# Patient Record
Sex: FEMALE | Race: WHITE | NOT HISPANIC OR LATINO | URBAN - METROPOLITAN AREA
[De-identification: names, ages, dates, MRNs, and addresses within clinical notes are randomized per-mention and may not be internally consistent; named-entity substitution may affect disease eponyms.]

---

## 2017-01-10 ENCOUNTER — EMERGENCY (EMERGENCY)
Facility: HOSPITAL | Age: 37
LOS: 1 days | Discharge: HOME | End: 2017-01-10
Attending: EMERGENCY MEDICINE | Admitting: EMERGENCY MEDICINE
Payer: COMMERCIAL

## 2017-01-10 VITALS
RESPIRATION RATE: 16 BRPM | SYSTOLIC BLOOD PRESSURE: 149 MMHG | OXYGEN SATURATION: 100 % | HEART RATE: 88 BPM | DIASTOLIC BLOOD PRESSURE: 85 MMHG | TEMPERATURE: 98 F

## 2017-01-10 DIAGNOSIS — Z98.84 BARIATRIC SURGERY STATUS: Chronic | ICD-10-CM

## 2017-01-10 PROCEDURE — 99283 EMERGENCY DEPT VISIT LOW MDM: CPT

## 2017-01-10 RX ORDER — OXYCODONE HYDROCHLORIDE 5 MG/1
1 TABLET ORAL
Qty: 8 | Refills: 0 | OUTPATIENT
Start: 2017-01-10 | End: 2017-01-12

## 2017-01-10 NOTE — ED PROVIDER NOTE - OBJECTIVE STATEMENT
35 y/o F, w/ PMHx of Bariatric Surgery, Polyps, and Asthma, presents to ED c/o worsening tooth pain due to fractured tooth. Taking Tylenol and [id number] Motrin to no relief. Recently underwent Bariatric surgery so she could not take care of her dental issue. Scheduled for dental appt this coming 1/13/17. Was told she has an impacted wisdom tooth that needs to be pulled. Denies other complaints.

## 2017-01-10 NOTE — ED PROVIDER NOTE - PLAN OF CARE
Rest, drink plenty of fluids.  Advance activity as tolerated.  Continue all previously prescribed medications as directed.  Take Motrin 600 mg every 8 hours as needed for moderate pain -- take with food. Take Percocet 325/5 once every 6 hours as needed for severe pain -- causes drowsiness; DO NOT drink alcohol, drive, or operate heavy machinery with this medication. Follow up with your dental clinic in 48 hours (call [phone])- bring copies of your results.  Return to the ER for worsening or persistent symptoms, including swelling of face/gums, fevers.

## 2017-01-10 NOTE — ED PROVIDER NOTE - CARE PLAN
Principal Discharge DX:	Toothache Principal Discharge DX:	Toothache  Instructions for follow-up, activity and diet:	Rest, drink plenty of fluids.  Advance activity as tolerated.  Continue all previously prescribed medications as directed.  Take Motrin 600 mg every 8 hours as needed for moderate pain -- take with food. Take Percocet 325/5 once every 6 hours as needed for severe pain -- causes drowsiness; DO NOT drink alcohol, drive, or operate heavy machinery with this medication. Follow up with your dental clinic in 48 hours (call [phone])- bring copies of your results.  Return to the ER for worsening or persistent symptoms, including swelling of face/gums, fevers. Principal Discharge DX:	Toothache  Instructions for follow-up, activity and diet:	Rest, drink plenty of fluids.  Advance activity as tolerated.  Continue all previously prescribed medications as directed.  Take Motrin 600 mg every 8 hours as needed for moderate pain -- take with food. Take Percocet 325/5 once every 6 hours as needed for severe pain -- causes drowsiness; DO NOT drink alcohol, drive, or operate heavy machinery with this medication. Follow up with your dental clinic in 48 hours (call [phone])- bring copies of your results.  Return to the ER for worsening or persistent symptoms, including swelling of face/gums, fevers.

## 2017-01-10 NOTE — ED PROVIDER NOTE - PROGRESS NOTE DETAILS
JIMMY Ramirez:  Dental placed dental block.  Recommends pain control and follow up in clinic for dental extraction.  Pt endorses improvement in pain.  Pt medically stable for discharge.

## 2017-01-10 NOTE — ED PROVIDER NOTE - NS ED MD SCRIBE ATTENDING SCRIBE SECTIONS
PHYSICAL EXAM/REVIEW OF SYSTEMS/HISTORY OF PRESENT ILLNESS/HIV/DISPOSITION/PAST MEDICAL/SURGICAL/SOCIAL HISTORY/VITAL SIGNS( Pullset)

## 2017-01-10 NOTE — ED PROVIDER NOTE - DETAILS:
The scribe's documentation has been prepared under my direction and personally reviewed by me in its entirety. I confirm that the note above accurately reflects all work, treatment, procedures, and medical decision making performed by me. I performed the initial face to face bedside interview with this patient regarding history of present illness, review of symptoms and past medical, social and family history.  I completed an independent physical examination.  I was the initial provider who evaluated this patient.  The history, review of symptoms and examination was documented by the scribe in my presence and I attest to the accuracy of the documentation.  I have signed out the follow up of any pending tests (i.e. labs, radiological studies) to the PA.  I have discussed the patient’s plan of care and disposition with the PA.

## 2017-01-10 NOTE — ED PROVIDER NOTE - PMH
Asthma    Knee dislocation, left, sequela    Meniscus tear    Polyp  rectal  Vertigo, benign positional

## 2017-05-16 ENCOUNTER — APPOINTMENT (OUTPATIENT)
Dept: OTOLARYNGOLOGY | Facility: CLINIC | Age: 37
End: 2017-05-16

## 2017-06-13 ENCOUNTER — APPOINTMENT (OUTPATIENT)
Dept: GASTROENTEROLOGY | Facility: CLINIC | Age: 37
End: 2017-06-13

## 2018-01-02 ENCOUNTER — EMERGENCY (EMERGENCY)
Facility: HOSPITAL | Age: 38
LOS: 1 days | Discharge: HOME | End: 2018-01-02
Attending: EMERGENCY MEDICINE
Payer: COMMERCIAL

## 2018-01-02 VITALS
DIASTOLIC BLOOD PRESSURE: 91 MMHG | HEART RATE: 70 BPM | TEMPERATURE: 98 F | OXYGEN SATURATION: 100 % | RESPIRATION RATE: 18 BRPM | SYSTOLIC BLOOD PRESSURE: 152 MMHG

## 2018-01-02 DIAGNOSIS — Z98.84 BARIATRIC SURGERY STATUS: Chronic | ICD-10-CM

## 2018-01-02 PROCEDURE — 94640 AIRWAY INHALATION TREATMENT: CPT

## 2018-01-02 PROCEDURE — 99284 EMERGENCY DEPT VISIT MOD MDM: CPT

## 2018-01-02 PROCEDURE — 93005 ELECTROCARDIOGRAM TRACING: CPT

## 2018-01-02 PROCEDURE — 71046 X-RAY EXAM CHEST 2 VIEWS: CPT

## 2018-01-02 PROCEDURE — 71046 X-RAY EXAM CHEST 2 VIEWS: CPT | Mod: 26

## 2018-01-02 PROCEDURE — 99283 EMERGENCY DEPT VISIT LOW MDM: CPT | Mod: 25

## 2018-01-02 RX ORDER — IPRATROPIUM/ALBUTEROL SULFATE 18-103MCG
6 AEROSOL WITH ADAPTER (GRAM) INHALATION ONCE
Qty: 0 | Refills: 0 | Status: COMPLETED | OUTPATIENT
Start: 2018-01-02 | End: 2018-01-02

## 2018-01-02 RX ORDER — AZITHROMYCIN 500 MG/1
1 TABLET, FILM COATED ORAL
Qty: 6 | Refills: 0 | OUTPATIENT
Start: 2018-01-02

## 2018-01-02 RX ORDER — IBUPROFEN 200 MG
600 TABLET ORAL ONCE
Qty: 0 | Refills: 0 | Status: COMPLETED | OUTPATIENT
Start: 2018-01-02 | End: 2018-01-02

## 2018-01-02 RX ADMIN — Medication 6 MILLILITER(S): at 20:51

## 2018-01-02 NOTE — ED PROVIDER NOTE - OBJECTIVE STATEMENT
36 y/o female, pmhx asthma concerned that she may have pneumonia secondary to cough x4 days, denies feeling like typical asthma exacerbation. Denies fever/chills, sob/dyspnea, or any other concerns. Used nebulizer x2 today with minimal relief. 36 y/o female, pmhx asthma concerned that she may have pneumonia secondary to cough which does not feel like typical asthma exacerbation. Also c/o chest discomfort secondary to coughing. Denies fever/chills, sob/dyspnea, abdominal pain, palpitations, diaphoresis, or any other concerns. No h/o family cardiac h/o MI <50. Used nebulizer x2 today with minimal relief.

## 2018-01-02 NOTE — ED PROVIDER NOTE - MEDICAL DECISION MAKING DETAILS
pt well appearing, vss afebrile, no signs respiratory distress c/o difficulty breathing, states she can ambulate to xray prior to receiving duonebs, will send to xray give duoneb and re-assess.

## 2018-01-02 NOTE — ED PROVIDER NOTE - PROGRESS NOTE DETAILS
pt confirms moderate improvement of symptoms, states she must leave ED to head to work, had detailed discussion regarding concerns for leaving prior to full re-assessment, states she has rescue inhaler at home, will send zpack to hold, and dc home with strict return instructions.

## 2018-02-19 ENCOUNTER — TRANSCRIPTION ENCOUNTER (OUTPATIENT)
Age: 38
End: 2018-02-19

## 2018-05-19 ENCOUNTER — TRANSCRIPTION ENCOUNTER (OUTPATIENT)
Age: 38
End: 2018-05-19

## 2018-06-17 ENCOUNTER — TRANSCRIPTION ENCOUNTER (OUTPATIENT)
Age: 38
End: 2018-06-17

## 2018-06-20 ENCOUNTER — EMERGENCY (EMERGENCY)
Facility: HOSPITAL | Age: 38
LOS: 1 days | Discharge: HOME | End: 2018-06-20
Attending: EMERGENCY MEDICINE | Admitting: EMERGENCY MEDICINE
Payer: COMMERCIAL

## 2018-06-20 VITALS
OXYGEN SATURATION: 100 % | DIASTOLIC BLOOD PRESSURE: 99 MMHG | RESPIRATION RATE: 17 BRPM | HEART RATE: 70 BPM | TEMPERATURE: 98 F | SYSTOLIC BLOOD PRESSURE: 159 MMHG

## 2018-06-20 DIAGNOSIS — Z98.84 BARIATRIC SURGERY STATUS: Chronic | ICD-10-CM

## 2018-06-20 LAB
ALBUMIN SERPL ELPH-MCNC: 3.8 G/DL — SIGNIFICANT CHANGE UP (ref 3.3–5)
ALP SERPL-CCNC: 84 U/L — SIGNIFICANT CHANGE UP (ref 40–120)
ALT FLD-CCNC: 19 U/L — SIGNIFICANT CHANGE UP (ref 4–33)
AST SERPL-CCNC: 20 U/L — SIGNIFICANT CHANGE UP (ref 4–32)
BASE EXCESS BLDV CALC-SCNC: -0.3 MMOL/L — SIGNIFICANT CHANGE UP
BASOPHILS # BLD AUTO: 0.04 K/UL — SIGNIFICANT CHANGE UP (ref 0–0.2)
BASOPHILS NFR BLD AUTO: 0.5 % — SIGNIFICANT CHANGE UP (ref 0–2)
BILIRUB SERPL-MCNC: < 0.2 MG/DL — LOW (ref 0.2–1.2)
BLOOD GAS VENOUS - CREATININE: 0.74 MG/DL — SIGNIFICANT CHANGE UP (ref 0.5–1.3)
BUN SERPL-MCNC: 15 MG/DL — SIGNIFICANT CHANGE UP (ref 7–23)
CALCIUM SERPL-MCNC: 8.8 MG/DL — SIGNIFICANT CHANGE UP (ref 8.4–10.5)
CHLORIDE BLDV-SCNC: 113 MMOL/L — HIGH (ref 96–108)
CHLORIDE SERPL-SCNC: 106 MMOL/L — SIGNIFICANT CHANGE UP (ref 98–107)
CO2 SERPL-SCNC: 23 MMOL/L — SIGNIFICANT CHANGE UP (ref 22–31)
CREAT SERPL-MCNC: 0.74 MG/DL — SIGNIFICANT CHANGE UP (ref 0.5–1.3)
EOSINOPHIL # BLD AUTO: 0.34 K/UL — SIGNIFICANT CHANGE UP (ref 0–0.5)
EOSINOPHIL NFR BLD AUTO: 4.1 % — SIGNIFICANT CHANGE UP (ref 0–6)
GAS PNL BLDV: 135 MMOL/L — LOW (ref 136–146)
GLUCOSE BLDV-MCNC: 93 — SIGNIFICANT CHANGE UP (ref 70–99)
GLUCOSE SERPL-MCNC: 91 MG/DL — SIGNIFICANT CHANGE UP (ref 70–99)
HBA1C BLD-MCNC: 5.1 % — SIGNIFICANT CHANGE UP (ref 4–5.6)
HCO3 BLDV-SCNC: 23 MMOL/L — SIGNIFICANT CHANGE UP (ref 20–27)
HCT VFR BLD CALC: 32 % — LOW (ref 34.5–45)
HCT VFR BLDV CALC: 31 % — LOW (ref 34.5–45)
HGB BLD-MCNC: 9.7 G/DL — LOW (ref 11.5–15.5)
HGB BLDV-MCNC: 10 G/DL — LOW (ref 11.5–15.5)
IMM GRANULOCYTES # BLD AUTO: 0.03 # — SIGNIFICANT CHANGE UP
IMM GRANULOCYTES NFR BLD AUTO: 0.4 % — SIGNIFICANT CHANGE UP (ref 0–1.5)
LACTATE BLDV-MCNC: 0.6 MMOL/L — SIGNIFICANT CHANGE UP (ref 0.5–2)
LYMPHOCYTES # BLD AUTO: 2.16 K/UL — SIGNIFICANT CHANGE UP (ref 1–3.3)
LYMPHOCYTES # BLD AUTO: 25.8 % — SIGNIFICANT CHANGE UP (ref 13–44)
MCHC RBC-ENTMCNC: 24.6 PG — LOW (ref 27–34)
MCHC RBC-ENTMCNC: 30.3 % — LOW (ref 32–36)
MCV RBC AUTO: 81.2 FL — SIGNIFICANT CHANGE UP (ref 80–100)
MONOCYTES # BLD AUTO: 0.54 K/UL — SIGNIFICANT CHANGE UP (ref 0–0.9)
MONOCYTES NFR BLD AUTO: 6.4 % — SIGNIFICANT CHANGE UP (ref 2–14)
NEUTROPHILS # BLD AUTO: 5.27 K/UL — SIGNIFICANT CHANGE UP (ref 1.8–7.4)
NEUTROPHILS NFR BLD AUTO: 62.8 % — SIGNIFICANT CHANGE UP (ref 43–77)
NRBC # FLD: 0 — SIGNIFICANT CHANGE UP
PCO2 BLDV: 47 MMHG — SIGNIFICANT CHANGE UP (ref 41–51)
PH BLDV: 7.34 PH — SIGNIFICANT CHANGE UP (ref 7.32–7.43)
PLATELET # BLD AUTO: 387 K/UL — SIGNIFICANT CHANGE UP (ref 150–400)
PMV BLD: 9.3 FL — SIGNIFICANT CHANGE UP (ref 7–13)
PO2 BLDV: < 24 MMHG — LOW (ref 35–40)
POTASSIUM BLDV-SCNC: 4.5 MMOL/L — SIGNIFICANT CHANGE UP (ref 3.4–4.5)
POTASSIUM SERPL-MCNC: 4.5 MMOL/L — SIGNIFICANT CHANGE UP (ref 3.5–5.3)
POTASSIUM SERPL-SCNC: 4.5 MMOL/L — SIGNIFICANT CHANGE UP (ref 3.5–5.3)
PROT SERPL-MCNC: 7.2 G/DL — SIGNIFICANT CHANGE UP (ref 6–8.3)
RBC # BLD: 3.94 M/UL — SIGNIFICANT CHANGE UP (ref 3.8–5.2)
RBC # FLD: 15.5 % — HIGH (ref 10.3–14.5)
SAO2 % BLDV: 20.8 % — LOW (ref 60–85)
SODIUM SERPL-SCNC: 140 MMOL/L — SIGNIFICANT CHANGE UP (ref 135–145)
WBC # BLD: 8.38 K/UL — SIGNIFICANT CHANGE UP (ref 3.8–10.5)
WBC # FLD AUTO: 8.38 K/UL — SIGNIFICANT CHANGE UP (ref 3.8–10.5)

## 2018-06-20 PROCEDURE — 70491 CT SOFT TISSUE NECK W/DYE: CPT | Mod: 26

## 2018-06-20 PROCEDURE — 99220: CPT

## 2018-06-20 RX ORDER — ONDANSETRON 8 MG/1
4 TABLET, FILM COATED ORAL ONCE
Qty: 0 | Refills: 0 | Status: COMPLETED | OUTPATIENT
Start: 2018-06-20 | End: 2018-06-20

## 2018-06-20 RX ORDER — KETOROLAC TROMETHAMINE 30 MG/ML
15 SYRINGE (ML) INJECTION ONCE
Qty: 0 | Refills: 0 | Status: DISCONTINUED | OUTPATIENT
Start: 2018-06-20 | End: 2018-06-20

## 2018-06-20 RX ORDER — AMPICILLIN SODIUM AND SULBACTAM SODIUM 250; 125 MG/ML; MG/ML
1.5 INJECTION, POWDER, FOR SUSPENSION INTRAMUSCULAR; INTRAVENOUS EVERY 6 HOURS
Qty: 0 | Refills: 0 | Status: DISCONTINUED | OUTPATIENT
Start: 2018-06-20 | End: 2018-06-24

## 2018-06-20 RX ORDER — OXYCODONE AND ACETAMINOPHEN 5; 325 MG/1; MG/1
1 TABLET ORAL ONCE
Qty: 0 | Refills: 0 | Status: DISCONTINUED | OUTPATIENT
Start: 2018-06-20 | End: 2018-06-20

## 2018-06-20 RX ORDER — HYDROMORPHONE HYDROCHLORIDE 2 MG/ML
1 INJECTION INTRAMUSCULAR; INTRAVENOUS; SUBCUTANEOUS ONCE
Qty: 0 | Refills: 0 | Status: DISCONTINUED | OUTPATIENT
Start: 2018-06-20 | End: 2018-06-20

## 2018-06-20 RX ORDER — MORPHINE SULFATE 50 MG/1
4 CAPSULE, EXTENDED RELEASE ORAL ONCE
Qty: 0 | Refills: 0 | Status: DISCONTINUED | OUTPATIENT
Start: 2018-06-20 | End: 2018-06-20

## 2018-06-20 RX ORDER — ONDANSETRON 8 MG/1
4 TABLET, FILM COATED ORAL EVERY 4 HOURS
Qty: 0 | Refills: 0 | Status: DISCONTINUED | OUTPATIENT
Start: 2018-06-20 | End: 2018-06-24

## 2018-06-20 RX ORDER — HYDROMORPHONE HYDROCHLORIDE 2 MG/ML
0.5 INJECTION INTRAMUSCULAR; INTRAVENOUS; SUBCUTANEOUS ONCE
Qty: 0 | Refills: 0 | Status: DISCONTINUED | OUTPATIENT
Start: 2018-06-20 | End: 2018-06-20

## 2018-06-20 RX ORDER — KETOROLAC TROMETHAMINE 30 MG/ML
30 SYRINGE (ML) INJECTION EVERY 6 HOURS
Qty: 0 | Refills: 0 | Status: COMPLETED | OUTPATIENT
Start: 2018-06-20 | End: 2018-06-21

## 2018-06-20 RX ORDER — MORPHINE SULFATE 50 MG/1
2 CAPSULE, EXTENDED RELEASE ORAL ONCE
Qty: 0 | Refills: 0 | Status: DISCONTINUED | OUTPATIENT
Start: 2018-06-20 | End: 2018-06-20

## 2018-06-20 RX ORDER — SODIUM CHLORIDE 9 MG/ML
1000 INJECTION INTRAMUSCULAR; INTRAVENOUS; SUBCUTANEOUS
Qty: 0 | Refills: 0 | Status: DISCONTINUED | OUTPATIENT
Start: 2018-06-20 | End: 2018-06-21

## 2018-06-20 RX ADMIN — ONDANSETRON 4 MILLIGRAM(S): 8 TABLET, FILM COATED ORAL at 21:54

## 2018-06-20 RX ADMIN — SODIUM CHLORIDE 150 MILLILITER(S): 9 INJECTION INTRAMUSCULAR; INTRAVENOUS; SUBCUTANEOUS at 22:07

## 2018-06-20 RX ADMIN — HYDROMORPHONE HYDROCHLORIDE 1 MILLIGRAM(S): 2 INJECTION INTRAMUSCULAR; INTRAVENOUS; SUBCUTANEOUS at 21:56

## 2018-06-20 RX ADMIN — Medication 15 MILLIGRAM(S): at 10:44

## 2018-06-20 RX ADMIN — ONDANSETRON 4 MILLIGRAM(S): 8 TABLET, FILM COATED ORAL at 19:09

## 2018-06-20 RX ADMIN — Medication 15 MILLIGRAM(S): at 11:14

## 2018-06-20 RX ADMIN — HYDROMORPHONE HYDROCHLORIDE 1 MILLIGRAM(S): 2 INJECTION INTRAMUSCULAR; INTRAVENOUS; SUBCUTANEOUS at 23:00

## 2018-06-20 RX ADMIN — ONDANSETRON 4 MILLIGRAM(S): 8 TABLET, FILM COATED ORAL at 09:36

## 2018-06-20 RX ADMIN — HYDROMORPHONE HYDROCHLORIDE 0.5 MILLIGRAM(S): 2 INJECTION INTRAMUSCULAR; INTRAVENOUS; SUBCUTANEOUS at 15:10

## 2018-06-20 RX ADMIN — MORPHINE SULFATE 4 MILLIGRAM(S): 50 CAPSULE, EXTENDED RELEASE ORAL at 15:01

## 2018-06-20 RX ADMIN — HYDROMORPHONE HYDROCHLORIDE 1 MILLIGRAM(S): 2 INJECTION INTRAMUSCULAR; INTRAVENOUS; SUBCUTANEOUS at 17:20

## 2018-06-20 RX ADMIN — MORPHINE SULFATE 2 MILLIGRAM(S): 50 CAPSULE, EXTENDED RELEASE ORAL at 14:37

## 2018-06-20 RX ADMIN — Medication 15 MILLIGRAM(S): at 15:10

## 2018-06-20 RX ADMIN — ONDANSETRON 4 MILLIGRAM(S): 8 TABLET, FILM COATED ORAL at 16:39

## 2018-06-20 RX ADMIN — AMPICILLIN SODIUM AND SULBACTAM SODIUM 100 GRAM(S): 250; 125 INJECTION, POWDER, FOR SUSPENSION INTRAMUSCULAR; INTRAVENOUS at 14:36

## 2018-06-20 RX ADMIN — Medication 15 MILLIGRAM(S): at 15:54

## 2018-06-20 RX ADMIN — MORPHINE SULFATE 2 MILLIGRAM(S): 50 CAPSULE, EXTENDED RELEASE ORAL at 14:27

## 2018-06-20 RX ADMIN — Medication 30 MILLIGRAM(S): at 23:00

## 2018-06-20 RX ADMIN — HYDROMORPHONE HYDROCHLORIDE 1 MILLIGRAM(S): 2 INJECTION INTRAMUSCULAR; INTRAVENOUS; SUBCUTANEOUS at 17:04

## 2018-06-20 RX ADMIN — HYDROMORPHONE HYDROCHLORIDE 1 MILLIGRAM(S): 2 INJECTION INTRAMUSCULAR; INTRAVENOUS; SUBCUTANEOUS at 19:23

## 2018-06-20 RX ADMIN — Medication 30 MILLIGRAM(S): at 21:55

## 2018-06-20 RX ADMIN — AMPICILLIN SODIUM AND SULBACTAM SODIUM 100 GRAM(S): 250; 125 INJECTION, POWDER, FOR SUSPENSION INTRAMUSCULAR; INTRAVENOUS at 19:49

## 2018-06-20 RX ADMIN — HYDROMORPHONE HYDROCHLORIDE 1 MILLIGRAM(S): 2 INJECTION INTRAMUSCULAR; INTRAVENOUS; SUBCUTANEOUS at 19:09

## 2018-06-20 RX ADMIN — OXYCODONE AND ACETAMINOPHEN 1 TABLET(S): 5; 325 TABLET ORAL at 08:10

## 2018-06-20 RX ADMIN — OXYCODONE AND ACETAMINOPHEN 1 TABLET(S): 5; 325 TABLET ORAL at 08:40

## 2018-06-20 RX ADMIN — HYDROMORPHONE HYDROCHLORIDE 0.5 MILLIGRAM(S): 2 INJECTION INTRAMUSCULAR; INTRAVENOUS; SUBCUTANEOUS at 15:54

## 2018-06-20 RX ADMIN — MORPHINE SULFATE 4 MILLIGRAM(S): 50 CAPSULE, EXTENDED RELEASE ORAL at 14:36

## 2018-06-20 NOTE — CONSULT NOTE ADULT - ASSESSMENT
ASSESSMENT: Patient is a 36 y/o F w/ subperiosteal infection associated w/ carious tooth #18 s/p exo #18, I & D of associated infection     PLAN:   - No further surgical intervention by OMFS indicated at this time, pt scheduled to follow up in [facility] clinic on 6/22 for re-eval   - Recommend starting Augmentin 875mg BID x 7days  - Recommend Peridex OH BID   - Recommend pain management prn   - Patient seen/examined w/ Dr. Gardner

## 2018-06-20 NOTE — ED CDU PROVIDER INITIAL DAY NOTE - ATTENDING CONTRIBUTION TO CARE
CDU MD HERNANDEZ:  I performed a face to face bedside interview with patient regarding history of present illness, review of symptoms and past medical history. I completed an independent physical exam.  I have discussed patient's plan of care with PA.   I agree with note as stated above, having amended the EMR as needed to reflect my findings. I have discussed the assessment and plan of care.  This includes during the time I functioned as the attending physician for this patient.    HPI / ROS / PE / MDM written by myself.

## 2018-06-20 NOTE — ED CDU PROVIDER INITIAL DAY NOTE - MEDICAL DECISION MAKING DETAILS
38 y/o female s/p dental procedure in ED (tooth extraction and I&D for dental abscess) sent to CDU for pain control, will continue abx.

## 2018-06-20 NOTE — CONSULT NOTE ADULT - SUBJECTIVE AND OBJECTIVE BOX
ORAL MAXILLOFACIAL SURGERY CONSULT NOTE  --------------------------------------------------------------------------------------------    38 y/o F w/ PMH sig for asthma, obesity who presented with L sided facial swelling since Thursday 6/14.Pt states that she developed a fever on Saturday 6/16 and went to urgent care at which time she was told she had a dental abscess and was started on Amoxicillin. On examination pt reports pain, swelling but denies dyspnea, dysphagia or odynophagia. Maxillofacial CT was obtained in the ED which was sig for localized odontogenic infection. While in the ED pt was afebrile and hemodynamically stable.     Patient denies fevers/chills, denies lightheadedness/dizziness, denies SOB/chest pain, denies nausea/vomiting, denies constipation/diarrhea.      ROS: 10-system review is otherwise negative except HPI above.      PAST MEDICAL & SURGICAL HISTORY:  Polyp: rectal  Meniscus tear  Knee dislocation, left, sequela  Vertigo, benign positional  Asthma  Bariatric surgery status  Status post laparoscopic sleeve gastrectomy    FAMILY HISTORY:  No pertinent family history in first degree relatives    [x] Family history not pertinent as reviewed with the patient    SOCIAL HISTORY:  (-) etoh, (-) tobacco, (-) illicit drugs     ALLERGIES: pseudoephedrine (Other)  Reglan (Other)  sulfonamides (Hives)    HOME MEDICATIONS:  denies     CURRENT MEDICATIONS  MEDICATIONS (STANDING): ampicillin/sulbactam  IVPB 1.5 Gram(s) IV Intermittent every 6 hours    MEDICATIONS (PRN):  --------------------------------------------------------------------------------------------    Vitals:   T(C): 36.9 (06-20-18 @ 16:30), Max: 36.9 (06-20-18 @ 15:20)  HR: 68 (06-20-18 @ 16:30) (68 - 71)  BP: 133/80 (06-20-18 @ 16:30) (133/80 - 159/99)  RR: 16 (06-20-18 @ 16:30) (16 - 18)  SpO2: 99% (06-20-18 @ 16:30) (99% - 100%)  CAPILLARY BLOOD GLUCOSE    PHYSICAL EXAM:  General: AAOx3,NAD  Resp: unlabored breathing, normal resp effort   Card: RRR  EOE: (+) fullness lateral to L mandible, (+) TTP, palpable inferior border of mandible, (-) palpable LAD, TMJ WNL, (-) trismus, ABILIO:40mm   IOE: poor OH, rampant caries, (+) carious roots #18, (-) buccal vestibular swelling, (+) localized lingual swelling adjacent site #18, (-) edilberto purulence/drainage, FOM soft, uvula midline, (+) TTP    --------------------------------------------------------------------------------------------    LABS  CBC (06-20 @ 09:30)                              9.7<L>                         8.38    )----------------(  387        62.8  % Neutrophils, 25.8  % Lymphocytes, ANC: 5.27                                32.0<L>    BMP (06-20 @ 09:30)             140     |  106     |  15    		Ca++ --      Ca 8.8                ---------------------------------( 91    		Mg --                 4.5     |  23      |  0.74  			Ph --        LFTs (06-20 @ 09:30)      TPro 7.2 / Alb 3.8 / TBili < 0.2<L> / DBili -- / AST 20 / ALT 19 / AlkPhos 84          VBG (06-20 @ 09:30)     7.34 / 47 / < 24<L> / 23 / -0.3 / 20.8<L>%     Lactate: 0.6    --------------------------------------------------------------------------------------------    IMAGING    FINDINGS:   There is moderately extensive dental carious disease.     Along the left mandibular alveolus, the crown of the left second molar tooth   is absent due to dental carious disease. There is periodontal lucency and   periapical lucency associated with this third molar tooth with associated   lingual cortical dehiscence along the mandible consistent with periodontal   and endodontic disease. There is bordering enhancing phlegmon and a   subperiosteal rim-enhancing fluid collection consistent with a subperiosteal   abscess measuring approximately 1.0 x 0.6 x 0.8 cm within the submandibular   space anteriorly bordering the submandibular gland.     There is mild bordering asymmetric thickening of the mylohyoid muscle   consistent with bordering myositis. The left submental or gland is slightly   asymmetrically enlarged which may represent associated secondary   sialoadenitis.     There is inflammatory fatty reticulation within the bordering left   submandibular space with asymmetric thickening of the left platysma and   reactive left lymphadenopathy.     There is no mass effect upon the oral cavity. The aerodigestive tract   otherwise appears intact.     The parotid glands are within normal limits. There is heterogeneity of the   thyroid gland; a nonspecific finding.     The vascular structures demonstrate normal enhancement.     There is no soft tissue fluid collection or mass lesion.     There are no suspicious osteolytic or blastic lesions.     The visualized intracranial and intraorbital compartments fail to   demonstrate abnormal enhancement, or mass effect.     The lung apices are within normal limits.       IMPRESSION:   Odontogenic left subperiosteal abscess within the submandibular space   associated with dental carious disease left second molar tooth  -----------------------------------------------------------------------------------------    TREATMENT:     R/B/A of treatment including but not limited to pain, bleeding, bruising, swelling, infection, paresthesia, need for additional procedures were discussed w/ pt, QA/QA, pt understands, informed consent obtained.     Time out performed. 2 carps 2% lido 1:100k epi administered via L IANB, 1 carp 4% articaine 1:200k epi administered via local infiltration, PDL infection. 15 blade used to make sulcular incision, periosteal elevator used to reflect FTMPF, millie used to make buccal trough and section tooth, tooth delivered w/ straight elevator and lower forcep, dental curette/periosteal elevator used to bluntly dissect subperiosteally on the lingual aspect of mandible adjacent to site #18, copious amount of NS used to irrigate subperiosteally, 3-0 CGS placed x1 in interrupted fashion, hemostasis achieved, POIG, no complications.

## 2018-06-20 NOTE — ED PROVIDER NOTE - PHYSICAL EXAMINATION
L lower gum swelling and TTP to palpation  tolerating secretions  uvula midline  no neck tenderness of swelling

## 2018-06-20 NOTE — PROGRESS NOTE ADULT - SUBJECTIVE AND OBJECTIVE BOX
Patient is a 37y old  Female who presents with a chief complaint of tooth pain.    HPI: Patient reports that discomfort started Thursday and she went to urgent care on Saturday where they gave her amoxicillin.      PAST MEDICAL & SURGICAL HISTORY:  Polyp: rectal  Meniscus tear  Knee dislocation, left, sequela  Vertigo, benign positional  Asthma  Bariatric surgery status  Status post laparoscopic sleeve gastrectomy    ( - ) heart valve replacement  ( - ) joint replacement  ( - ) pregnancy    MEDICATIONS  (STANDING):    MEDICATIONS  (PRN):      Allergies    pseudoephedrine (Other)  Reglan (Other)  sulfonamides (Hives)    Intolerances        FAMILY HISTORY:  No pertinent family history in first degree relatives      Vital Signs Last 24 Hrs  T(C): 36.4 (20 Jun 2018 07:21), Max: 36.4 (20 Jun 2018 07:21)  T(F): 97.5 (20 Jun 2018 07:21), Max: 97.5 (20 Jun 2018 07:21)  HR: 70 (20 Jun 2018 07:21) (70 - 70)  BP: 159/99 (20 Jun 2018 07:21) (159/99 - 159/99)  BP(mean): --  RR: 17 (20 Jun 2018 07:21) (17 - 17)  SpO2: 100% (20 Jun 2018 07:21) (100% - 100%)    EOE:    TMJ: ( - ) clicks  ( - ) pops  ( - ) crepitus  Mandible: FROM  Facial bones and MOM: grossly intact  ( - ) trismus  ( - ) LAD  ( + ) swelling: mild left side  ( + ) asymmetry: left  ( + ) palpation: submandibular pain upon palpation on left side  ( - ) SOB  ( + ) dysphagia: pain upon swallowing    IOE:   permanent dentition: multiple carious teeth   hard/soft palate: WNL  tongue/FOM: WNL  labial/buccal mucosa: WNL  ( + ) percussion: #18  ( + ) palpation: mild pain on buccal vestibule palpation, but significant pain on palpation of lingual  ( + ) swelling: fullness on buccal vestibule on LL  ( + ) abscess: lingual to #18  ( - ) sinus tract    LABS:                        9.7    8.38  )-----------( 387      ( 20 Jun 2018 09:30 )             32.0     06-20    140  |  106  |  15  ----------------------------<  91  4.5   |  23  |  0.74    Ca    8.8      20 Jun 2018 09:30    TPro  7.2  /  Alb  3.8  /  TBili  < 0.2<L>  /  DBili  x   /  AST  20  /  ALT  19  /  AlkPhos  84  06-20    WBC Count: 8.38 K/uL [3.8 - 10.5] (06-20 @ 09:30)  Platelet Count - Automated: 387 K/uL [150 - 400] (06-20 @ 09:30)        DENTAL RADIOGRAPHS: panoramic taken    ASSESSMENT: Lingual abscess present and PAR on grossly decayed #18    RECOMMENDATIONS:  1) Maxillofacial CT with contrast and OS consult  2) Dental F/U with outpatient dentist for comprehensive dental care.   3) If any difficulty swallowing/breathing, fever occur, return to ER.     Pamela Bowles DDS, pager #[phone]  Lazaro Nathan DDS  [phone]

## 2018-06-20 NOTE — ED PROVIDER NOTE - PROGRESS NOTE DETAILS
eric oliver: seen by dental - no drainable dental collection- they spoke to OMFS who recommend CT maxfacial to r.o deeper infection. pt stable, tolerating airway, no acute distress. Matt Antoine: Pt s/p tooth extraction and I&D with clean out by dental- was cleared for dc with augmentin and f/u with them on friday at 11am. pt was in severe pain and will be sent to CDU for pain control. agrees with plan. Matt Antoine: Pt s/p tooth extraction and I&D with clean out by dental- was cleared for dc with augmentin and f/u with them on friday at 11am. pt was in severe pain resistant to oral medications, and will be sent to CDU for pain control. agrees with plan.

## 2018-06-20 NOTE — ED PROVIDER NOTE - ATTENDING CONTRIBUTION TO CARE
36 y/o f presenting with gum pain/swelling. Symptoms started few days ago, left lower gum, progressively more painful/swollen, had fever 3d ago with tm of 103, went to urgent care and was treated with oral amoxicillin. Pain has continued in that area, constant, now feels it under left side of jaw as well, associated with swelling. No difficulty swallowing, no sob, no cp, sob, abd pain, neck stiffness, vomiting, diarrhea, dysuria.   exam  GEN - NAD; well appearing; A+O x3   HEAD - NC/AT   EYES- PERRL, EOMI  ENT: Airway patent, mmm, Oral cavity and pharynx normal. Left posterior mandibular molar region with decaying tooth, +ttp along gum line, no drooling/stridor/voice change. uvula midline, fullness and swelling to left submandibular region, no crepitus/warmth/erythema, no tongue elevation  NECK: Neck supple, non-tender, no masses.  PULMONARY - CTA b/l, symmetric breath sounds.   CARDIAC -s1s2, RRR, no M,G,R  ABDOMEN - +BS, ND, NT, soft, no guarding, no rebound, no masses   BACK - no CVA tenderness, Normal  spine   EXTREMITIES - FROM, symmetric pulses, capillary refill < 2 seconds, no edema   SKIN - no rash or bruising   NEUROLOGIC - alert, speech clear, no focal deficits  PSYCH -nl mood/affect, nl insight.  a/p-patient with left jaw/gum pain/swelling worsening over last few days despite oral amoxicillin, airway patent, nontoxic appearing, will check labs, ct to eval extension, dental eval, abx, reass.

## 2018-06-20 NOTE — ED CDU PROVIDER INITIAL DAY NOTE - OBJECTIVE STATEMENT
37 y.o female pmhx of asthma here with L sided lower gum swelling since Thursday. Developed fever on Saturday and went to urgent care, told she had a dental abscess and was started on Amoxicillin. Denies trouble breathing/swallowing, n/v/d, abdominal pain.    As above.  In ED, had ct demonstrating dental abscess.  Seen by dental:  s/p tooth extraction and I&D.  Advised dc on augmentin, but sent to CDU for pain control.

## 2018-06-20 NOTE — ED CDU PROVIDER INITIAL DAY NOTE - FAMILY HISTORY
No pertinent family history in first degree relatives Grandparent  Still living? Unknown  Family history of colon cancer, Age at diagnosis: Age Unknown     Uncle  Still living? Unknown  Family history of colon cancer, Age at diagnosis: Age Unknown

## 2018-06-20 NOTE — ED ADULT TRIAGE NOTE - CHIEF COMPLAINT QUOTE
pt had a tooth ache, seen at urgent care on saturday placed on po abx and told she had an abcess, +fevers, has been taking tylonel/motrin around the clock, +nausea, denies cp or sob, comfortable in triage. no pmh

## 2018-06-20 NOTE — ED PROVIDER NOTE - OBJECTIVE STATEMENT
37 y.o female pmhx of asthma here with L sided lower gum swelling 37 y.o female pmhx of asthma here with L sided lower gum swelling since Thursday. Developed fever on saturday and went to urgent care, told she had a dental abscess and was started on Amoxicillin. Denies trouble breathing/swallowing, n/v/d, abdominal pain. 37 y.o female pmhx of asthma here with L sided lower gum swelling since Thursday. Developed fever on Saturday and went to urgent care, told she had a dental abscess and was started on Amoxicillin. Denies trouble breathing/swallowing, n/v/d, abdominal pain.

## 2018-06-20 NOTE — ED PROVIDER NOTE - MEDICAL DECISION MAKING DETAILS
37 y.o female pmhx of asthma here with L sided gum pain and swelling since thurday on Amoxicillin since saturday with no improvement. TTP along L lower gum line, concern for dental abscess. ucg, pain control, Dental consult. 37 y.o female pmhx of asthma here with L sided gum pain and swelling since thursday on Amoxicillin since saturday with no improvement. TTP along L lower gum line, concern for dental abscess. ucg, pain control, Dental consult.

## 2018-06-20 NOTE — ED CDU PROVIDER INITIAL DAY NOTE - PROGRESS NOTE DETAILS
This patient was signed out to me by CDU day JIMMY Mcginnis and attending Dr. Bowles at 1900 hrs; test results and orders reviewed; OMFS consult reviewed.  In the interim, no issues or c/o; pt objectively appears to be resting comfortably at present; presently sleeping.  Will continue to monitor.  Plan for overnight:  Analgesia prn, supportive care, IV antibiotics per orders.

## 2018-06-21 VITALS
DIASTOLIC BLOOD PRESSURE: 79 MMHG | RESPIRATION RATE: 16 BRPM | HEART RATE: 72 BPM | SYSTOLIC BLOOD PRESSURE: 125 MMHG | OXYGEN SATURATION: 100 % | TEMPERATURE: 98 F

## 2018-06-21 PROCEDURE — 99217: CPT

## 2018-06-21 RX ORDER — OXYCODONE HYDROCHLORIDE 5 MG/1
1 TABLET ORAL
Qty: 12 | Refills: 0 | OUTPATIENT
Start: 2018-06-21 | End: 2018-06-23

## 2018-06-21 RX ORDER — OXYCODONE AND ACETAMINOPHEN 5; 325 MG/1; MG/1
1 TABLET ORAL ONCE
Qty: 0 | Refills: 0 | Status: DISCONTINUED | OUTPATIENT
Start: 2018-06-21 | End: 2018-06-24

## 2018-06-21 RX ORDER — IBUPROFEN 200 MG
1 TABLET ORAL
Qty: 30 | Refills: 0 | OUTPATIENT
Start: 2018-06-21

## 2018-06-21 RX ADMIN — Medication 30 MILLIGRAM(S): at 00:00

## 2018-06-21 RX ADMIN — Medication 30 MILLIGRAM(S): at 03:48

## 2018-06-21 RX ADMIN — ONDANSETRON 4 MILLIGRAM(S): 8 TABLET, FILM COATED ORAL at 03:05

## 2018-06-21 RX ADMIN — AMPICILLIN SODIUM AND SULBACTAM SODIUM 100 GRAM(S): 250; 125 INJECTION, POWDER, FOR SUSPENSION INTRAMUSCULAR; INTRAVENOUS at 06:09

## 2018-06-21 RX ADMIN — Medication 30 MILLIGRAM(S): at 03:05

## 2018-06-21 RX ADMIN — AMPICILLIN SODIUM AND SULBACTAM SODIUM 100 GRAM(S): 250; 125 INJECTION, POWDER, FOR SUSPENSION INTRAMUSCULAR; INTRAVENOUS at 01:00

## 2018-06-21 RX ADMIN — Medication 30 MILLIGRAM(S): at 08:46

## 2018-06-21 RX ADMIN — ONDANSETRON 4 MILLIGRAM(S): 8 TABLET, FILM COATED ORAL at 08:47

## 2018-06-21 RX ADMIN — SODIUM CHLORIDE 150 MILLILITER(S): 9 INJECTION INTRAMUSCULAR; INTRAVENOUS; SUBCUTANEOUS at 05:30

## 2018-06-21 NOTE — ED CDU PROVIDER SUBSEQUENT DAY NOTE - PROGRESS NOTE DETAILS
Pt reassessed, feeling much better this morning. Does not have pain now. Has an appt bart at 11am w/ dental. Will dc w/ Augmentin and Motrin/Oxycodone for pain.

## 2018-06-21 NOTE — ED CDU PROVIDER DISPOSITION NOTE - CLINICAL COURSE
This pt was signed out to me at 0700 on 6/21/18.  The pt is a 36 yo female with asthma who was brought to ED with pain/swelling to left lower gum, found to have gum abscess and had extraction and I&D performed by OMFS/dental.  Placed in CDU for pain control and re-eval.  Overnight pt needing parenteral pain control but at morning rounding pt noting improvement in pain.  On exam pt well appearing, in NAD, heart RRR, lungs CTAB, abd NTND, extremities without swelling, strength 5/5 in all extremities and skin without rash.  Oral cavity patent with mild swelling to left lower gums and external cheek but no erythema or obvious cellulitis.  Tooth site from extraction appearing as expected.  Pt tolerating PO and pain controlled.  Stable for discharge with outpatient follow up. The pt is a 36 yo female with asthma who was brought to ED with pain/swelling to left lower gum, found to have gum abscess and had extraction and I&D performed by OMFS/dental.  Placed in CDU for pain control and re-eval.  Overnight pt needing parenteral pain control but at morning rounding pt noting improvement in pain.  On exam pt well appearing, in NAD, heart RRR, lungs CTAB, abd NTND, extremities without swelling, strength 5/5 in all extremities and skin without rash.  Oral cavity patent with mild swelling to left lower gums and external cheek but no erythema or obvious cellulitis.  Tooth site from extraction appearing as expected.  Pt tolerating PO and pain controlled.  Stable for discharge with outpatient follow up.

## 2018-06-21 NOTE — ED CDU PROVIDER SUBSEQUENT DAY NOTE - MEDICAL DECISION MAKING DETAILS
36 y/o female s/p dental procedure in ED (tooth extraction and I&D for dental abscess) sent to CDU for pain control, will continue abx.

## 2018-06-21 NOTE — ED CDU PROVIDER DISPOSITION NOTE - ATTENDING CONTRIBUTION TO CARE
CDU Attending Dr. Beckwith Discharge Note:  The pt is a 38 yo female with asthma who was brought to ED with pain/swelling to left lower gum, found to have gum abscess and had extraction and I&D performed by OMFS/dental.  Placed in CDU for pain control and re-eval.  Overnight pt needing parenteral pain control but at morning rounding pt noting improvement in pain.  On exam pt well appearing, in NAD, heart RRR, lungs CTAB, abd NTND, extremities without swelling, strength 5/5 in all extremities and skin without rash.  Oral cavity patent with mild swelling to left lower gums and external cheek but no erythema or obvious cellulitis.  Tooth site from extraction appearing as expected.  Pt tolerating PO and pain controlled.  Stable for discharge with outpatient follow up.

## 2018-06-21 NOTE — ED CDU PROVIDER SUBSEQUENT DAY NOTE - ATTENDING CONTRIBUTION TO CARE
CDU Attending Dr. Beckwith: This pt was signed out to me at 0700 on 6/21/18.  The pt is a 38 yo female with asthma who was brought to ED with pain/swelling to left lower gum, found to have gum abscess and had extraction and I&D performed by OMFS/dental.  Placed in CDU for pain control and re-eval.  Overnight pt needing parenteral pain control but at morning rounding pt noting improvement in pain.  On exam pt well appearing, in NAD, heart RRR, lungs CTAB, abd NTND, extremities without swelling, strength 5/5 in all extremities and skin without rash.  Oral cavity patent with mild swelling to left lower gums and external cheek but no erythema or obvious cellulitis.  Tooth site from extraction appearing as expected.  Will transition pt to PO meds and make sure pt can tolerate liquids.

## 2018-06-21 NOTE — ED CDU PROVIDER SUBSEQUENT DAY NOTE - ENMT, MLM
Airway patent. Nasal mucosa clear. Mouth with moist mucosa. Left lower jaw extraction / I&D site with no active drainage or discharge.  Mild swelling of jaw adjacent to this site.  No other oral lesions.  No facial / neck erythema noted.  Neck supple.

## 2018-06-21 NOTE — ED CDU PROVIDER SUBSEQUENT DAY NOTE - HISTORY
ED Provider Note HPI: "37 y.o female pmhx of asthma here with L sided lower gum swelling since Thursday. Developed fever on Saturday and went to urgent care, told she had a dental abscess and was started on Amoxicillin. Denies trouble breathing/swallowing, n/v/d, abdominal pain."  Pt. was evaluated in the ED and diagnosed on CT scan with dental abscess.  OMFS was consulted; extraction of tooth #18 and I&D of abscess performed.  Pt. was cleared from OMFS perspective, but pt was having significant pain, so ED sent pt to CDU for pain control / supportive care.  In the interim, pain improves with Dilaudid; Zofran has helped for nausea.  Bleeding has ceased from site of procedure; no other c/o in the interim.

## 2018-06-21 NOTE — ED CDU PROVIDER SUBSEQUENT DAY NOTE - FAMILY HISTORY
Grandparent  Still living? Unknown  Family history of colon cancer, Age at diagnosis: Age Unknown     Uncle  Still living? Unknown  Family history of colon cancer, Age at diagnosis: Age Unknown

## 2018-06-21 NOTE — ED CDU PROVIDER DISPOSITION NOTE - PLAN OF CARE
See your primary care doctor within 24-48 hours, bring copies of all reports with you. Follow up with dental tomorrow for further evaluation. Take Augmentin 1 tab twice a day for a total of 7 days. Take Motrin 600mg every 8hrs with food for pain.  Oxycodone 1 tablets every 6 hrs as needed for severe pain not better with Motrin- caution drowsiness while taking this medication- do not drive or operate heavy machinery. Return to the ER for worsening symptoms or any other concerns.

## 2018-06-23 ENCOUNTER — EMERGENCY (EMERGENCY)
Facility: HOSPITAL | Age: 38
LOS: 1 days | Discharge: HOME | End: 2018-06-23
Attending: EMERGENCY MEDICINE | Admitting: EMERGENCY MEDICINE
Payer: COMMERCIAL

## 2018-06-23 VITALS
DIASTOLIC BLOOD PRESSURE: 96 MMHG | SYSTOLIC BLOOD PRESSURE: 156 MMHG | TEMPERATURE: 98 F | HEART RATE: 62 BPM | RESPIRATION RATE: 18 BRPM | OXYGEN SATURATION: 100 %

## 2018-06-23 DIAGNOSIS — Z98.84 BARIATRIC SURGERY STATUS: Chronic | ICD-10-CM

## 2018-06-23 PROCEDURE — 99283 EMERGENCY DEPT VISIT LOW MDM: CPT

## 2018-06-23 RX ORDER — KETOROLAC TROMETHAMINE 30 MG/ML
30 SYRINGE (ML) INJECTION ONCE
Qty: 0 | Refills: 0 | Status: DISCONTINUED | OUTPATIENT
Start: 2018-06-23 | End: 2018-06-23

## 2018-06-23 RX ORDER — OXYCODONE AND ACETAMINOPHEN 5; 325 MG/1; MG/1
1 TABLET ORAL ONCE
Qty: 0 | Refills: 0 | Status: DISCONTINUED | OUTPATIENT
Start: 2018-06-23 | End: 2018-06-23

## 2018-06-23 RX ADMIN — Medication 30 MILLIGRAM(S): at 23:01

## 2018-06-23 RX ADMIN — OXYCODONE AND ACETAMINOPHEN 1 TABLET(S): 5; 325 TABLET ORAL at 23:01

## 2018-06-23 NOTE — ED PROVIDER NOTE - CONSTITUTIONAL, MLM
normal... Uncomfortable appearing, well nourished, awake, alert, oriented to person, place, time/situation.

## 2018-06-23 NOTE — ED PROVIDER NOTE - OBJECTIVE STATEMENT
37F with recent dental extraction secondary to abscess p/w dental pain. Patient was admitted for extraction and abscess drainage wednesday, dc'd thursday and has been following with OMFS. Area healing well per last visit. patient ran out of oxycodone and motrin not controlling pain. no fever, chills or drainage from the area. Has f/u appointment with [facility] this Monday.

## 2018-06-23 NOTE — ED ADULT TRIAGE NOTE - CHIEF COMPLAINT QUOTE
pt. w/ hx. asthma c/o left sided dental pain . pt. states she had oral surgery on Wednesday was admitted for pain control and antibiotic treatment. Pt. arrives stating dental pain is unbearable. Pt. received 3x days worth of Motrin and percocet , but no pain relief.

## 2018-06-23 NOTE — ED PROVIDER NOTE - ENMT, MLM
Airway patent, Nasal mucosa clear. Mouth with normal mucosa. Throat has no vesicles, no oropharyngeal exudates and uvula is midline. extracted molar on bottom left with small area of swelling, slight tenderness to palpation. no drainage.

## 2018-06-23 NOTE — ED PROVIDER NOTE - MEDICAL DECISION MAKING DETAILS
37F with recent dental extraction and dental abscess on abx. no fever/chills. pain control, outpt f/u with OMFS.

## 2018-06-23 NOTE — ED PROVIDER NOTE - ATTENDING CONTRIBUTION TO CARE
Dr. Humphrey: I have personally performed a face to face bedside history and physical examination of this patient. I have discussed the history, examination, review of systems, assessment and plan of management with the resident. I have reviewed the electronic medical record and amended it to reflect my history, review of systems, physical exam, assessment and plan.    Attending Exam - Dr. Humphrey: L lower posterior molar socket s/p extraction without gross swelling/erythema

## 2018-10-29 ENCOUNTER — INPATIENT (INPATIENT)
Facility: HOSPITAL | Age: 38
LOS: 3 days | Discharge: HOME | DRG: 176 | End: 2018-11-02
Attending: INTERNAL MEDICINE | Admitting: INTERNAL MEDICINE
Payer: COMMERCIAL

## 2018-10-29 VITALS
OXYGEN SATURATION: 98 % | RESPIRATION RATE: 30 BRPM | SYSTOLIC BLOOD PRESSURE: 144 MMHG | DIASTOLIC BLOOD PRESSURE: 102 MMHG | TEMPERATURE: 98 F | HEART RATE: 114 BPM

## 2018-10-29 DIAGNOSIS — Z98.84 BARIATRIC SURGERY STATUS: Chronic | ICD-10-CM

## 2018-10-29 LAB
ALBUMIN SERPL ELPH-MCNC: 4.3 G/DL — SIGNIFICANT CHANGE UP (ref 3.3–5)
ALP SERPL-CCNC: 108 U/L — SIGNIFICANT CHANGE UP (ref 40–120)
ALT FLD-CCNC: 15 U/L — SIGNIFICANT CHANGE UP (ref 10–45)
ANION GAP SERPL CALC-SCNC: 15 MMOL/L — SIGNIFICANT CHANGE UP (ref 5–17)
AST SERPL-CCNC: 16 U/L — SIGNIFICANT CHANGE UP (ref 10–40)
BASOPHILS # BLD AUTO: 0 K/UL — SIGNIFICANT CHANGE UP (ref 0–0.2)
BASOPHILS NFR BLD AUTO: 0.5 % — SIGNIFICANT CHANGE UP (ref 0–2)
BILIRUB SERPL-MCNC: 0.3 MG/DL — SIGNIFICANT CHANGE UP (ref 0.2–1.2)
BUN SERPL-MCNC: 17 MG/DL — SIGNIFICANT CHANGE UP (ref 7–23)
CALCIUM SERPL-MCNC: 9.4 MG/DL — SIGNIFICANT CHANGE UP (ref 8.4–10.5)
CHLORIDE SERPL-SCNC: 102 MMOL/L — SIGNIFICANT CHANGE UP (ref 96–108)
CO2 SERPL-SCNC: 22 MMOL/L — SIGNIFICANT CHANGE UP (ref 22–31)
CREAT SERPL-MCNC: 0.75 MG/DL — SIGNIFICANT CHANGE UP (ref 0.5–1.3)
EOSINOPHIL # BLD AUTO: 0.7 K/UL — HIGH (ref 0–0.5)
EOSINOPHIL NFR BLD AUTO: 6.6 % — HIGH (ref 0–6)
GLUCOSE SERPL-MCNC: 110 MG/DL — HIGH (ref 70–99)
HCT VFR BLD CALC: 39.6 % — SIGNIFICANT CHANGE UP (ref 34.5–45)
HGB BLD-MCNC: 12.5 G/DL — SIGNIFICANT CHANGE UP (ref 11.5–15.5)
LYMPHOCYTES # BLD AUTO: 3.4 K/UL — HIGH (ref 1–3.3)
LYMPHOCYTES # BLD AUTO: 32.9 % — SIGNIFICANT CHANGE UP (ref 13–44)
MCHC RBC-ENTMCNC: 24.7 PG — LOW (ref 27–34)
MCHC RBC-ENTMCNC: 31.5 GM/DL — LOW (ref 32–36)
MCV RBC AUTO: 78.4 FL — LOW (ref 80–100)
MONOCYTES # BLD AUTO: 0.6 K/UL — SIGNIFICANT CHANGE UP (ref 0–0.9)
MONOCYTES NFR BLD AUTO: 5.6 % — SIGNIFICANT CHANGE UP (ref 2–14)
NEUTROPHILS # BLD AUTO: 5.7 K/UL — SIGNIFICANT CHANGE UP (ref 1.8–7.4)
NEUTROPHILS NFR BLD AUTO: 54.5 % — SIGNIFICANT CHANGE UP (ref 43–77)
PLATELET # BLD AUTO: 348 K/UL — SIGNIFICANT CHANGE UP (ref 150–400)
POTASSIUM SERPL-MCNC: 4.2 MMOL/L — SIGNIFICANT CHANGE UP (ref 3.5–5.3)
POTASSIUM SERPL-SCNC: 4.2 MMOL/L — SIGNIFICANT CHANGE UP (ref 3.5–5.3)
PROT SERPL-MCNC: 8 G/DL — SIGNIFICANT CHANGE UP (ref 6–8.3)
RBC # BLD: 5.05 M/UL — SIGNIFICANT CHANGE UP (ref 3.8–5.2)
RBC # FLD: 14.2 % — SIGNIFICANT CHANGE UP (ref 10.3–14.5)
SODIUM SERPL-SCNC: 139 MMOL/L — SIGNIFICANT CHANGE UP (ref 135–145)
WBC # BLD: 10.5 K/UL — SIGNIFICANT CHANGE UP (ref 3.8–10.5)
WBC # FLD AUTO: 10.5 K/UL — SIGNIFICANT CHANGE UP (ref 3.8–10.5)

## 2018-10-29 PROCEDURE — 99218: CPT

## 2018-10-29 PROCEDURE — 71046 X-RAY EXAM CHEST 2 VIEWS: CPT | Mod: 26

## 2018-10-29 RX ORDER — SODIUM CHLORIDE 9 MG/ML
1000 INJECTION INTRAMUSCULAR; INTRAVENOUS; SUBCUTANEOUS ONCE
Qty: 0 | Refills: 0 | Status: COMPLETED | OUTPATIENT
Start: 2018-10-29 | End: 2018-10-29

## 2018-10-29 RX ORDER — MAGNESIUM SULFATE 500 MG/ML
2 VIAL (ML) INJECTION ONCE
Qty: 0 | Refills: 0 | Status: COMPLETED | OUTPATIENT
Start: 2018-10-29 | End: 2018-10-29

## 2018-10-29 RX ORDER — IPRATROPIUM/ALBUTEROL SULFATE 18-103MCG
3 AEROSOL WITH ADAPTER (GRAM) INHALATION ONCE
Qty: 0 | Refills: 0 | Status: COMPLETED | OUTPATIENT
Start: 2018-10-29 | End: 2018-10-29

## 2018-10-29 RX ADMIN — Medication 125 MILLIGRAM(S): at 23:04

## 2018-10-29 RX ADMIN — Medication 3 MILLILITER(S): at 23:04

## 2018-10-29 RX ADMIN — Medication 3 MILLILITER(S): at 22:55

## 2018-10-29 RX ADMIN — Medication 50 GRAM(S): at 23:05

## 2018-10-29 RX ADMIN — SODIUM CHLORIDE 1000 MILLILITER(S): 9 INJECTION INTRAMUSCULAR; INTRAVENOUS; SUBCUTANEOUS at 23:05

## 2018-10-29 NOTE — ED PROVIDER NOTE - OBJECTIVE STATEMENT
37 y/o morbidly obese female PMHx asthma ( three past intubations last when pt was 14 years old) woke up in acute onset of SOB this morning and felt dyspneic while ambulating to the bathroom. Patient took two nebs and used two rescue pumps with minimal relief of symptoms. Last dose at 6:15pm. Patient feels dyspneic while talking and SOB constant at rest. Patient denied CP, fever chills, cough, dizziness, headache     PMD Dr. Bj Dunn

## 2018-10-29 NOTE — ED PROVIDER NOTE - MEDICAL DECISION MAKING DETAILS
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 Peak Flow 200. Dyspneic when talks. Will obtain CXR, RVP, bloodwork, possible CDU for obsevration -ZR

## 2018-10-30 DIAGNOSIS — R06.02 SHORTNESS OF BREATH: ICD-10-CM

## 2018-10-30 DIAGNOSIS — E66.01 MORBID (SEVERE) OBESITY DUE TO EXCESS CALORIES: ICD-10-CM

## 2018-10-30 DIAGNOSIS — I26.99 OTHER PULMONARY EMBOLISM WITHOUT ACUTE COR PULMONALE: ICD-10-CM

## 2018-10-30 DIAGNOSIS — J45.909 UNSPECIFIED ASTHMA, UNCOMPLICATED: ICD-10-CM

## 2018-10-30 DIAGNOSIS — J45.901 UNSPECIFIED ASTHMA WITH (ACUTE) EXACERBATION: ICD-10-CM

## 2018-10-30 LAB
APTT BLD: 126.5 SEC — CRITICAL HIGH (ref 27.5–36.3)
APTT BLD: 30.7 SEC — SIGNIFICANT CHANGE UP (ref 27.5–36.3)
HCT VFR BLD CALC: 38.6 % — SIGNIFICANT CHANGE UP (ref 34.5–45)
HGB BLD-MCNC: 12.3 G/DL — SIGNIFICANT CHANGE UP (ref 11.5–15.5)
INR BLD: 1.2 RATIO — HIGH (ref 0.88–1.16)
MCHC RBC-ENTMCNC: 24.8 PG — LOW (ref 27–34)
MCHC RBC-ENTMCNC: 31.8 GM/DL — LOW (ref 32–36)
MCV RBC AUTO: 78 FL — LOW (ref 80–100)
PLATELET # BLD AUTO: 369 K/UL — SIGNIFICANT CHANGE UP (ref 150–400)
PROTHROM AB SERPL-ACNC: 13 SEC — HIGH (ref 10–12.9)
RAPID RVP RESULT: SIGNIFICANT CHANGE UP
RBC # BLD: 4.95 M/UL — SIGNIFICANT CHANGE UP (ref 3.8–5.2)
RBC # FLD: 14.3 % — SIGNIFICANT CHANGE UP (ref 10.3–14.5)
TROPONIN T, HIGH SENSITIVITY RESULT: 37 NG/L — SIGNIFICANT CHANGE UP (ref 0–51)
WBC # BLD: 15.1 K/UL — HIGH (ref 3.8–10.5)
WBC # FLD AUTO: 15.1 K/UL — HIGH (ref 3.8–10.5)

## 2018-10-30 PROCEDURE — 71275 CT ANGIOGRAPHY CHEST: CPT | Mod: 26

## 2018-10-30 PROCEDURE — 93010 ELECTROCARDIOGRAM REPORT: CPT

## 2018-10-30 PROCEDURE — 93970 EXTREMITY STUDY: CPT | Mod: 26

## 2018-10-30 PROCEDURE — 99217: CPT

## 2018-10-30 PROCEDURE — 99255 IP/OBS CONSLTJ NEW/EST HI 80: CPT | Mod: GC

## 2018-10-30 PROCEDURE — 93306 TTE W/DOPPLER COMPLETE: CPT | Mod: 26

## 2018-10-30 PROCEDURE — G0452: CPT | Mod: 26

## 2018-10-30 RX ORDER — DIPHENHYDRAMINE HCL 50 MG
50 CAPSULE ORAL ONCE
Qty: 0 | Refills: 0 | Status: COMPLETED | OUTPATIENT
Start: 2018-10-30 | End: 2018-10-30

## 2018-10-30 RX ORDER — ALBUTEROL 90 UG/1
2.5 AEROSOL, METERED ORAL
Qty: 0 | Refills: 0 | Status: DISCONTINUED | OUTPATIENT
Start: 2018-10-30 | End: 2018-10-30

## 2018-10-30 RX ORDER — HEPARIN SODIUM 5000 [USP'U]/ML
10000 INJECTION INTRAVENOUS; SUBCUTANEOUS ONCE
Qty: 0 | Refills: 0 | Status: COMPLETED | OUTPATIENT
Start: 2018-10-30 | End: 2018-10-30

## 2018-10-30 RX ORDER — HEPARIN SODIUM 5000 [USP'U]/ML
5000 INJECTION INTRAVENOUS; SUBCUTANEOUS EVERY 6 HOURS
Qty: 0 | Refills: 0 | Status: DISCONTINUED | OUTPATIENT
Start: 2018-10-30 | End: 2018-11-01

## 2018-10-30 RX ORDER — TIOTROPIUM BROMIDE 18 UG/1
1 CAPSULE ORAL; RESPIRATORY (INHALATION) DAILY
Qty: 0 | Refills: 0 | Status: DISCONTINUED | OUTPATIENT
Start: 2018-10-30 | End: 2018-11-02

## 2018-10-30 RX ORDER — IPRATROPIUM/ALBUTEROL SULFATE 18-103MCG
3 AEROSOL WITH ADAPTER (GRAM) INHALATION EVERY 4 HOURS
Qty: 0 | Refills: 0 | Status: DISCONTINUED | OUTPATIENT
Start: 2018-10-30 | End: 2018-10-30

## 2018-10-30 RX ORDER — ACETAMINOPHEN 500 MG
975 TABLET ORAL ONCE
Qty: 0 | Refills: 0 | Status: COMPLETED | OUTPATIENT
Start: 2018-10-30 | End: 2018-10-30

## 2018-10-30 RX ORDER — SODIUM CHLORIDE 9 MG/ML
3 INJECTION INTRAMUSCULAR; INTRAVENOUS; SUBCUTANEOUS EVERY 8 HOURS
Qty: 0 | Refills: 0 | Status: DISCONTINUED | OUTPATIENT
Start: 2018-10-30 | End: 2018-11-02

## 2018-10-30 RX ORDER — HEPARIN SODIUM 5000 [USP'U]/ML
10000 INJECTION INTRAVENOUS; SUBCUTANEOUS EVERY 6 HOURS
Qty: 0 | Refills: 0 | Status: DISCONTINUED | OUTPATIENT
Start: 2018-10-30 | End: 2018-11-01

## 2018-10-30 RX ORDER — HEPARIN SODIUM 5000 [USP'U]/ML
INJECTION INTRAVENOUS; SUBCUTANEOUS
Qty: 25000 | Refills: 0 | Status: DISCONTINUED | OUTPATIENT
Start: 2018-10-30 | End: 2018-11-01

## 2018-10-30 RX ORDER — ALBUTEROL 90 UG/1
2 AEROSOL, METERED ORAL EVERY 6 HOURS
Qty: 0 | Refills: 0 | Status: DISCONTINUED | OUTPATIENT
Start: 2018-10-30 | End: 2018-11-02

## 2018-10-30 RX ADMIN — Medication 3 MILLILITER(S): at 08:17

## 2018-10-30 RX ADMIN — SODIUM CHLORIDE 3 MILLILITER(S): 9 INJECTION INTRAMUSCULAR; INTRAVENOUS; SUBCUTANEOUS at 05:58

## 2018-10-30 RX ADMIN — Medication 2 GRAM(S): at 01:21

## 2018-10-30 RX ADMIN — Medication 975 MILLIGRAM(S): at 08:45

## 2018-10-30 RX ADMIN — HEPARIN SODIUM 10000 UNIT(S): 5000 INJECTION INTRAVENOUS; SUBCUTANEOUS at 11:55

## 2018-10-30 RX ADMIN — HEPARIN SODIUM 2100 UNIT(S)/HR: 5000 INJECTION INTRAVENOUS; SUBCUTANEOUS at 19:12

## 2018-10-30 RX ADMIN — HEPARIN SODIUM 2400 UNIT(S)/HR: 5000 INJECTION INTRAVENOUS; SUBCUTANEOUS at 11:57

## 2018-10-30 RX ADMIN — Medication 975 MILLIGRAM(S): at 08:15

## 2018-10-30 RX ADMIN — Medication 3 MILLILITER(S): at 04:17

## 2018-10-30 RX ADMIN — Medication 50 MILLIGRAM(S): at 09:07

## 2018-10-30 RX ADMIN — SODIUM CHLORIDE 3 MILLILITER(S): 9 INJECTION INTRAMUSCULAR; INTRAVENOUS; SUBCUTANEOUS at 11:59

## 2018-10-30 RX ADMIN — Medication 40 MILLIGRAM(S): at 05:54

## 2018-10-30 RX ADMIN — SODIUM CHLORIDE 3 MILLILITER(S): 9 INJECTION INTRAMUSCULAR; INTRAVENOUS; SUBCUTANEOUS at 21:07

## 2018-10-30 RX ADMIN — Medication 3 MILLILITER(S): at 01:32

## 2018-10-30 NOTE — CONSULT NOTE ADULT - SUBJECTIVE AND OBJECTIVE BOX
CHIEF COMPLAINT: Pulmonary Emboli    HPI:  38F h/o asthma, obesity presents with     PAST MEDICAL & SURGICAL HISTORY:  Polyp: rectal  Meniscus tear  Knee dislocation, left, sequela  Vertigo, benign positional  Asthma  Bariatric surgery status  Status post laparoscopic sleeve gastrectomy      FAMILY HISTORY:  Family history of colon cancer (Uncle)  Family history of colon cancer (Grandparent)      SOCIAL HISTORY:  Smoking: [ ] Never Smoked [ ] Former Smoker (__ packs x ___ years) [ ] Current Smoker  (__ packs x ___ years)  Substance Use: [ ] Never Used [ ] Used ____  EtOH Use:  Marital Status: [ ] Single [ ]  [ ]  [ ]   Sexual History:   Occupation:  Recent Travel:  Country of Birth:  Advance Directives:    Allergies    pseudoephedrine (Other)  Reglan (Other)  sulfonamides (Hives)    Intolerances        HOME MEDICATIONS:    REVIEW OF SYSTEMS:  Constitutional: [ ] negative [ ] fevers [ ] chills [ ] weight loss [ ] weight gain  HEENT: [ ] negative [ ] dry eyes [ ] eye irritation [ ] postnasal drip [ ] nasal congestion  CV: [ ] negative  [ ] chest pain [ ] orthopnea [ ] palpitations [ ] murmur  Resp: [ ] negative [ ] cough [ ] shortness of breath [ ] dyspnea [ ] wheezing [ ] sputum [ ] hemoptysis  GI: [ ] negative [ ] nausea [ ] vomiting [ ] diarrhea [ ] constipation [ ] abd pain [ ] dysphagia   : [ ] negative [ ] dysuria [ ] nocturia [ ] hematuria [ ] increased urinary frequency  Musculoskeletal: [ ] negative [ ] back pain [ ] myalgias [ ] arthralgias [ ] fracture  Skin: [ ] negative [ ] rash [ ] itch  Neurological: [ ] negative [ ] headache [ ] dizziness [ ] syncope [ ] weakness [ ] numbness  Psychiatric: [ ] negative [ ] anxiety [ ] depression  Endocrine: [ ] negative [ ] diabetes [ ] thyroid problem  Hematologic/Lymphatic: [ ] negative [ ] anemia [ ] bleeding problem  Allergic/Immunologic: [ ] negative [ ] itchy eyes [ ] nasal discharge [ ] hives [ ] angioedema  [ ] All other systems negative  [ ] Unable to assess ROS because ________    OBJECTIVE:  ICU Vital Signs Last 24 Hrs  T(C): 37 (30 Oct 2018 07:44), Max: 37 (30 Oct 2018 07:44)  T(F): 98.6 (30 Oct 2018 07:44), Max: 98.6 (30 Oct 2018 07:44)  HR: 104 (30 Oct 2018 07:44) (101 - 114)  BP: 115/82 (30 Oct 2018 07:44) (115/82 - 144/102)  BP(mean): --  ABP: --  ABP(mean): --  RR: 22 (30 Oct 2018 07:44) (20 - 30)  SpO2: 97% (30 Oct 2018 07:44) (94% - 100%)        CAPILLARY BLOOD GLUCOSE          PHYSICAL EXAM:  General:   HEENT:   Lymph Nodes:  Neck:   Respiratory:   Cardiovascular:   Abdomen:   Extremities:   Skin:   Neurological:  Psychiatry:    LINES:     HOSPITAL MEDICATIONS:          ALBUTerol/ipratropium for Nebulization 3 milliLiter(s) Nebulizer every 4 hours  tiotropium 18 MICROgram(s) Capsule 1 Capsule(s) Inhalation daily            sodium chloride 0.9% lock flush 3 milliLiter(s) IV Push every 8 hours            LABS:                        12.5   10.5  )-----------( 348      ( 29 Oct 2018 23:10 )             39.6     Hgb Trend: 12.5<--  10-29    139  |  102  |  17  ----------------------------<  110<H>  4.2   |  22  |  0.75    Ca    9.4      29 Oct 2018 23:10    TPro  8.0  /  Alb  4.3  /  TBili  0.3  /  DBili  x   /  AST  16  /  ALT  15  /  AlkPhos  108  10-29    Creatinine Trend: 0.75<--  PTT - ( 30 Oct 2018 11:20 )  PTT:30.7 sec          MICROBIOLOGY:     RADIOLOGY:  [ ] Reviewed and interpreted by me    EKG: CHIEF COMPLAINT: Pulmonary Emboli    HPI:  38F h/o asthma, obesity presents with worsening SOB and HERR since last Sunday, most notable yesterday, when she felt winded just getting to the bathroom. She took several puffs of her inhaler and 2 neb treatments at home which did not help her. She states that she has been intubated in the past for her asthma exacerbations when she was young, but recently hadn't had an attack in over 6 months. 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 Denies any recent leg swelling, fevers, cough. Mother has history of multiple autoimmune disorders.    Was given asthma treatments in ER yesterday, CXR clear. Admitted to CDU (obs unit), sent for CTA as was still tachycardic, found to have several PEs. MICU consulted to determine need for thrombolysis. Vitals in CDU: , /90, Temp 98.6, O2 Sat (RA) 94%, RR 22    PAST MEDICAL & SURGICAL HISTORY:  Polyp: rectal  Meniscus tear  Knee dislocation, left, sequela  Vertigo, benign positional  Asthma  Bariatric surgery status  Status post laparoscopic sleeve gastrectomy      FAMILY HISTORY:  Family history of colon cancer (Uncle)  Family history of colon cancer (Grandparent)      SOCIAL HISTORY:  Smoking: [X] Never Smoked [ ] Former Smoker (__ packs x ___ years) [ ] Current Smoker  (__ packs x ___ years)  Substance Use: [ ] Never Used [ ] Used ____  Marital Status: [ ] Single [X]  [ ]  [ ]     Allergies    pseudoephedrine (Other)  Reglan (Other)  sulfonamides (Hives)    HOME MEDICATIONS:    REVIEW OF SYSTEMS:  Constitutional: [X] negative [ ] fevers [ ] chills [ ] weight loss [ ] weight gain  HEENT: [X] negative [ ] dry eyes [ ] eye irritation [ ] postnasal drip [ ] nasal congestion  CV: [ X] negative  [ ] chest pain [ ] orthopnea [ ] palpitations [ ] murmur  Resp: [ ] negative [ ] cough [X] shortness of breath [ ] dyspnea [ ] wheezing [ ] sputum [ ] hemoptysis  GI: [X] negative [ ] nausea [ ] vomiting [ ] diarrhea [ ] constipation [ ] abd pain [ ] dysphagia   : [X] negative [ ] dysuria [ ] nocturia [ ] hematuria [ ] increased urinary frequency  Musculoskeletal: [X] negative [ ] back pain [ ] myalgias [ ] arthralgias [ ] fracture  Skin: [X] negative [ ] rash [ ] itch  Neurological: [X] negative [ ] headache [ ] dizziness [ ] syncope [ ] weakness [ ] numbness  Psychiatric: [X] negative [ ] anxiety [ ] depression  Endocrine: [X] negative [ ] diabetes [ ] thyroid problem  Hematologic/Lymphatic: [X] negative [ ] anemia [ ] bleeding problem  Allergic/Immunologic: [X] negative [ ] itchy eyes [ ] nasal discharge [ ] hives [ ] angioedema  [X] All other systems negative  [ ] Unable to assess ROS because ________    OBJECTIVE:  ICU Vital Signs Last 24 Hrs  T(C): 37 (30 Oct 2018 07:44), Max: 37 (30 Oct 2018 07:44)  T(F): 98.6 (30 Oct 2018 07:44), Max: 98.6 (30 Oct 2018 07:44)  HR: 104 (30 Oct 2018 07:44) (101 - 114)  BP: 115/82 (30 Oct 2018 07:44) (115/82 - 144/102)  RR: 22 (30 Oct 2018 07:44) (20 - 30)  SpO2: 97% (30 Oct 2018 07:44) (94% - 100%)      PHYSICAL EXAM:  General: WN/WD NAD  Neurology: A&Ox3, nonfocal, DE LA O x 4  Eyes: PERRLA, Gross vision intact  HEENT: Neck supple, trachea midline, No JVD, Gross hearing intact  Respiratory: CTA B/L, No wheezing, rales, rhonchi, Tachypneic  CV: Tachycardic, regular rhytm, S1S2, no murmurs, rubs or gallops  Abdominal: Obese, Soft, NT, ND +BS  Extremities: No edema, + peripheral pulses  Skin: No Rashes, Hematoma, Ecchymosis    Lines/drains/airway: Peripheral IV    ALBUTerol/ipratropium for Nebulization 3 milliLiter(s) Nebulizer every 4 hours  tiotropium 18 MICROgram(s) Capsule 1 Capsule(s) Inhalation daily  sodium chloride 0.9% lock flush 3 milliLiter(s) IV Push every 8 hours      LABS:                        12.5   10.5  )-----------( 348      ( 29 Oct 2018 23:10 )             39.6     Hgb Trend: 12.5<--  10-29    139  |  102  |  17  ----------------------------<  110<H>  4.2   |  22  |  0.75    Ca    9.4      29 Oct 2018 23:10    TPro  8.0  /  Alb  4.3  /  TBili  0.3  /  DBili  x   /  AST  16  /  ALT  15  /  AlkPhos  108  10-29    Creatinine Trend: 0.75<--  PTT - ( 30 Oct 2018 11:20 )  PTT:30.7 sec      RADIOLOGY:  [X] Reviewed and interpreted by me

## 2018-10-30 NOTE — CONSULT NOTE ADULT - SUBJECTIVE AND OBJECTIVE BOX
CARDIOLOGY CONSULT - Dr. Tavarez     CHIEF COMPLAINT: SOB     HPI:  38F h/o asthma, obesity presents with worsening SOB and HERR. Symptoms started last Sunday, most notable yesterday when she felt winded just getting to the bathroom without relief after using her inhalers. CTA revealing + extensive bilateral PE with right heart strain. 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 She denies hx of valvular disease, chf, mi or cva. Reports mother has history of multiple autoimmune disorders. She reports recent intentional weight loss of approx 30 lbs.  She denies any recent leg swelling, fevers, cough, cp or palpitations. ROS otherwise negative.        PAST MEDICAL & SURGICAL HISTORY:  Polyp: rectal  Meniscus tear  Knee dislocation, left, sequela  Vertigo, benign positional  Asthma  Bariatric surgery status  Status post laparoscopic sleeve gastrectomy          PREVIOUS DIAGNOSTIC TESTING:    [ ] Echocardiogram:    [ ]  Catheterization:    [ ] Stress Test:  	    MEDICATIONS:  MEDICATIONS  (STANDING):  heparin  Infusion.  Unit(s)/Hr (24 mL/Hr) IV Continuous <Continuous>  sodium chloride 0.9% lock flush 3 milliLiter(s) IV Push every 8 hours  tiotropium 18 MICROgram(s) Capsule 1 Capsule(s) Inhalation daily      FAMILY HISTORY:  Family history of colon cancer (Uncle)  Family history of colon cancer (Grandparent)  Family history of heart Disease (Mom/ Dad)   Family history of Autoimmune disease (Mom)    SOCIAL HISTORY:    [x ] Non-smoker  [ ] Smoker  [ ] Alcohol    Allergies    pseudoephedrine (Other)  Reglan (Other)  sulfonamides (Hives)    Intolerances    	    REVIEW OF SYSTEMS:  CONSTITUTIONAL: No fever, weight loss, or fatigue  EYES: No eye pain, visual disturbances, or discharge  ENMT:  No difficulty hearing, tinnitus, vertigo; No sinus or throat pain  NECK: No pain or stiffness  RESPIRATORY: No cough, wheezing, chills or hemoptysis; +Shortness of Breath  CARDIOVASCULAR: No chest pain, palpitations, passing out, dizziness, or leg swelling  GASTROINTESTINAL: No abdominal or epigastric pain. No nausea, vomiting, or hematemesis; No diarrhea or constipation. No melena or hematochezia.  GENITOURINARY: No dysuria, frequency, hematuria, or incontinence  NEUROLOGICAL: No headaches, memory loss, loss of strength, numbness, or tremors  SKIN: No itching, burning, rashes, or lesions   	    [x ] All others negative	  [ ] Unable to obtain    PHYSICAL EXAM:  T(C): 37.4 (10-30-18 @ 12:00), Max: 37.4 (10-30-18 @ 12:00)  HR: 105 (10-30-18 @ 12:00) (101 - 115)  BP: 130/81 (10-30-18 @ 12:00) (115/82 - 144/102)  RR: 22 (10-30-18 @ 12:00) (20 - 30)  SpO2: 96% (10-30-18 @ 12:00) (94% - 100%)  Wt(kg): --  I&O's Summary      Appearance: Normal	  Psychiatry: A & O x 3, Mood & affect appropriate  HEENT:   Normal oral mucosa, PERRL, EOMI	  Lymphatic: No lymphadenopathy  Cardiovascular: Normal S1 S2,RRR, No JVD, No murmurs  Respiratory: Lungs clear to auscultation	  Gastrointestinal:  Soft, Non-tender, + BS	  Skin: No rashes, No ecchymoses, No cyanosis	  Neurologic: Non-focal  Extremities: + bl LE edema    TELEMETRY:NSR/ sinus tachycardia Hr  	    ECG:  sinus tachycardia 	  RADIOLOGY:  < from: CT Angio Chest w/ IV Cont (10.30.18 @ 10:29) >  IMPRESSION:    Extensive bilateral pulmonary emboli in all 5 lobes. Associated right   heart strain with a RV LV ratio greater than 1.      The findings were discussed with Dr. Cuevas at time of final signing.     -------------------------------------------------------------------------------------------------    < from: Xray Chest 2 Views PA/Lat (10.29.18 @ 23:43) >  FINDINGS:  The lungs are clear.  There are no pleural effusions or pneumothorax.  Cardiomediastinal silhouette is within normal limits.    IMPRESSION: Clear lungs.      < end of copied text >    OTHER: 	  	  LABS:	 	    CARDIAC MARKERS:                                  12.5   10.5  )-----------( 348      ( 29 Oct 2018 23:10 )             39.6     10-29    139  |  102  |  17  ----------------------------<  110<H>  4.2   |  22  |  0.75    Ca    9.4      29 Oct 2018 23:10    TPro  8.0  /  Alb  4.3  /  TBili  0.3  /  DBili  x   /  AST  16  /  ALT  15  /  AlkPhos  108  10-29    PT/INR - ( 30 Oct 2018 11:20 )   PT: 13.0 sec;   INR: 1.20 ratio         PTT - ( 30 Oct 2018 11:20 )  PTT:30.7 sec  proBNP: Serum Pro-Brain Natriuretic Peptide: 442 pg/mL (10-29 @ 23:10)    Lipid Profile:   HgA1c:   TSH:

## 2018-10-30 NOTE — ED CDU PROVIDER DISPOSITION NOTE - CLINICAL COURSE
37 y/o morbidly obese female PMHx asthma ( three past intubations last when pt was 14 years old) woke up in acute onset of SOB this morning and felt dyspneic while ambulating to the bathroom. Patient took two nebs and used two rescue pumps with minimal relief of symptoms. Last dose at 6:15pm. Patient feels dyspneic while talking and SOB constant at rest. Patient denied recent hospitalization for asthma exacerbation or illness, CP, lower leg swelling/pain, recent travel, fever chills, cough, dizziness, headache or other symptoms.  PMD Dr. Bj Dunn  In ED, Peak flow 200, report of dyspnea while talking and on ambulation. Pt was given solumedrol 125mg IVP, Mg 2 IVPB and multiple duoneb tx with only mild improvement. Peak flow 325 post treatments in ED and patient sent to CDU for continuous pulse ox, frequent eval, duo nebs and steroids. 37 y/o morbidly obese female PMHx asthma ( three past intubations last when pt was 14 years old) woke up in acute onset of SOB this morning and felt dyspneic while ambulating to the bathroom. Patient took two nebs and used two rescue pumps with minimal relief of symptoms. Last dose at 6:15pm. Patient feels dyspneic while talking and SOB constant at rest. Patient denied recent hospitalization for asthma exacerbation or illness, CP, lower leg swelling/pain, recent travel, fever chills, cough, dizziness, headache or other symptoms.  PMD Dr. Bj Dunn  In ED, Peak flow 200, report of dyspnea while talking and on ambulation. Pt was given solumedrol 125mg IVP, Mg 2 IVPB and multiple duoneb tx with only mild improvement. Peak flow 325 post treatments in ED and patient sent to CDU for continuous pulse ox, frequent eval, duo nebs and steroids. pt continued to have tachycardia and O2 low 90s on RA, overnight decided to order CTA chest. pt premedicated for test as per protocol, (pt was intubated in past for asthma). CTA chest shows massive PE b/l with R heart Strain. MICU consulted- recommend pt be admitted to the floor. will admit to medicine/tele.

## 2018-10-30 NOTE — ED CDU PROVIDER SUBSEQUENT DAY NOTE - HISTORY
CDU PROGRESS NOTE PA PRAVEEN: Pt resting comfortably, Lungs CTAB no wheezing signs of respiratory distress.  VSS. Pulses Ox Saturation 91-94% on RA. Will continue to monitor

## 2018-10-30 NOTE — ED CDU PROVIDER SUBSEQUENT DAY NOTE - MEDICAL DECISION MAKING DETAILS
Attending Casa Cuevas DO: 39 yo female hx of asthma presents with sob. Sent to CDU, treated as asthma exacerbation. Pt not improving. Hypoxic on RA, worse with exertion. Lungs clear on exam. Concern for PE as cause. CTA reveals extensive b/l PE with evidence of RHS on ct. PERC team called. MICU declined admission. Admission to tele with hep gtt.

## 2018-10-30 NOTE — CONSULT NOTE ADULT - ASSESSMENT
38F h/o asthma, obese lady presents with bilateral extensive PE. Right heart strain pattern.  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 No personal or family history of VTE.At present apart from morbid obesity no other obvious cause identified.  Will order complete hypercoagulable workup. Follow the results of B/L LE dopplers and ECHO. Cardiology and Pulmonary consults. Will follow the case. 38F h/o asthma, obese lady presents with bilateral extensive PE. Right heart strain pattern.  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 No personal or family history of VTE. Mother with rheumatoid arthritis and Sjogrens.   At present apart from morbid obesity no other obvious cause identified.  Will order complete hypercoagulable workup including rheumatologic workup. Follow the results of B/L LE dopplers and ECHO. Cardiology and Pulmonary consults. Will follow the case.

## 2018-10-30 NOTE — CONSULT NOTE ADULT - ASSESSMENT
38F h/o asthma presents with acute SOB and HERR. Found to have extensive bilateral pulmonary emboli in all 5 lobes, associated right heart strain. No known cause.     Appreciate MICU consult, will recommend floors with telemetry monitoring. Will follow patient, and please feel free to call back if any changes.    #Neuro  - AxO x4  - No acute concerns    #Resp (Mulitlobar PE with right heart strain)  - Heparin drip  - Trend trop/bnp  - No indication for catheter directed thrombolysis/thrombectomy as involvement of multiple vessels  - Continuous pulse ox    #CV (Tachycardia, right heart strain)  - Telemetry monitoring  - Monitor BP    #Heme  - Heparin drip, full anticoag protocol  - Monitor PTT as per protocol  - Workup for hypercoagulability  - Heme/rheum consult    #DVT PPx  - Heparin drip

## 2018-10-30 NOTE — H&P ADULT - NSHPREVIEWOFSYSTEMS_GEN_ALL_CORE
Gen: no loss of wt no loss of appetite  ENT: no dizziness no hearing loss  Ophth: no blurring of vision no loss of vision  Resp: see above HPI No cough no sputum production  CVS: No chest pain no palpitations no orthopnea  GI: no N/V/D  : no dysuria, hematuria  Endo: no polyuria no excessive sweating  Neuro: no weakness no paresthesias  Psych: No suicidal no depressive ideation  Heme: No petechiae no easy bruising  Msk: No joint pain no swelling  no leg pain or swelling   Skin: No rash no itching  All other ROS negative

## 2018-10-30 NOTE — ED ADULT NURSE NOTE - OBJECTIVE STATEMENT
37 y/o female, PMH asthma with prior intubation, a&o x3, c/o asthma exacerbation with dyspnea all day. Pt reports waking up this morning with SOB, used albuterol at home x4 treatments with no significant improvement. Pt received flu vaccine on Wednesday. Denies headache, weakness, dizziness, fevers, chills, or chest pains. Breathing even, tachypneic, shallow, dyspnea at rest made worse with exertion, bilat diminished lung sounds. No chest pain, no palpitations, cardiac monitor in place, Sinus Tachycardia. Abdomen soft, nontender, no nausea, no vomiting, no diarrhea, no constipation, no dysuria, no hematuria, no urinary frequency. Skin warm/dry/intact. Stretcher locked in low position. Advised of plan of care.

## 2018-10-30 NOTE — CONSULT NOTE ADULT - ASSESSMENT
38F h/o asthma, obesity presents with acute SOB, found to have extensive b/l multilobar PE with associate right heart strain.    1. Multilobar PE with right heart strain  CTA chest revealing extensive bl PE in all 5 lobes with associated right heart strain  recommend to check LE dopplers   check TTE to further eval RV fx   cv stable with no chest pain, SOB improving, no decomp CHF on exam.  MICU consult noted, no indication for catheter directed thrombolysis/thrombectomy  continue with Hep gtt   recommend hem/rheum eval for hypercoagulability work up   recommend tele monitoring     2. Tachycardia   in the setting of bl PE   hemodynamically stable, asymptomatic   check Echo to further eval RV fx/ eval LV fx     dvt  ppx 38F h/o asthma, obesity presents with acute SOB, found to have extensive b/l multilobar PE with associate right heart strain.    1. Multilobar PE with right heart strain  CTA chest revealing extensive bl PE in all 5 lobes with associated right heart strain  recommend to check LE dopplers   check TTE to further eval RV fx   cv stable with no chest pain, SOB improving, no decomp CHF on exam.  MICU consult noted, no indication for catheter directed thrombolysis/thrombectomy  continue with Hep gtt   recommend hem/rheum eval for hypercoagulability work up   recommend tele monitoring   c.o headache, consider head ct?    2. Tachycardia   in the setting of bl PE   hemodynamically stable, asymptomatic   check Echo to further eval RV fx/ eval LV fx     dvt  ppx

## 2018-10-30 NOTE — ED CDU PROVIDER DISPOSITION NOTE - PLAN OF CARE
(1) You will need to follow-up with your PCP, Dr. Bj Dunn in 2-3 days for your asthma exacerbation. A copy of your results were given to you to bring to your appointment.  (2) Drink PLENTY of fluids to stay hydrated.  (3) Continue your home medications along with Prednisone taper for next few days   (4) Return to ER for worsening shortness of breath, fevers, or any other concerns.

## 2018-10-30 NOTE — CONSULT NOTE ADULT - ATTENDING COMMENTS
discussed with patient in detail, all questions answered
38 year old woman with asthma, presented with dyspnea on exertion, not getting better with nebulizers CTA showed bilateral pulmonary emboli (all 5 lobes)    unprovoked PE  echocardiogram pending  troponin and BNP not elevated  no need for MICU admission or thrombolytics at this time  continue anticoagulation with heparin  pulmonary team to follow
agree with the above assessment and plan by MAURICIO Seay.  38F h/o asthma, obesity presents with acute SOB, found to have extensive b/l multilobar PE with associate right heart strain.  Pt hemodynamically stable  Continue AC  ECHO to eval RV function  Head CT given headache  Hypercoaguable workup
discussed with patient in detail, all questions answered

## 2018-10-30 NOTE — ED CDU PROVIDER SUBSEQUENT DAY NOTE - PROGRESS NOTE DETAILS
CDU PROGRESS NOTE PA PRAVEEN: Pt resting comfortably, Lungs CTAB no wheezing signs of respiratory distress.  VSS. Pulses Ox Saturation 91-94% on RA. Will continue to monitor CDU PROGRESS NOTE PA PRAVEEN: Pt resting comfortably, Lungs CTAB no wheezing signs of respiratory distress.  VSS. Pulses Ox Saturation 90-94% on RA. Will continue to monitor CDU PROGRESS NOTE PA PRAVEEN: O2 sat on RA 89-93%. Pt tachypneic and tachycardia on ambulation. EKG shows sinus tachycardia rate 110. c/d/w Dr. Ayala will order 2liters O2 nasal cannula and chest CT angio r/o P.E and continue to monitor. CDU PROGRESS NOTE JIMMY MORTON: Patient with previous hx of intubation due to asthma exacerbation. Hospital protocol for 3 hour Methylprednisolone 40mg IVP prior to 1hr benadryl 50mg IVP then CT study. c/d/w Radiologist on call and Dr. Ayala. Pt currently resting comfortably, no signs of respiratory distress saturating 97% on 2liters NC. CDU PROGRESS NOTE JIMMY MORTON: O2 sat on RA 89-93%. Pt tachypneic and tachycardia on ambulation. EKG shows sinus tachycardia rate 110. c/d/w Dr. Ayala will order 2liters O2 nasal cannula and chest CT angio r/o P.E and continue to monitor. Well's score 4.5 Sam PA- pt asleep, pt's HR 60s on tele monitoring. CDU NOTE JIMMY Vargas: pt resting comfortably, feels well, pt reports HERR, ok now with resting. pt reports some relief with nebs. pt states feels like her asthma but did not have pre-cursor of respiratory illness. denies cp, lightheadedness. NAD, tachy. Sinus tach on tele. pt awaiting CT scan. Dr. Cuevas received call CTA chest with extensive b/l PEs and R heart strain. per our discussion with activate PERT team. Order TTE. Order Heparin, after coags. CDU NOTE JIMMY Vargas: as per MICU- will add troponin and pro-BNP. they will come eval. CDU NOTE JIMMY Vargas: pt evaluated by MICU- recommend pt to be admitted to floor on tele.   Called and spoke to Dr. Meehan- will admit to his service.

## 2018-10-30 NOTE — ED ADULT NURSE REASSESSMENT NOTE - NS ED NURSE REASSESS COMMENT FT1
Pt received from GINNY Hunt. Pt oriented to CDU & plan of care was discussed. Pt A&O x 4. Pt in CDU for continuous pulse ox, frequent eval, duo nebs and steroids. Pt states that's she get SOB when she walks to the bathroom and when she just moves. Pt also states that she has tightness in her chest when she breaths. Pt on pulse ox monitor. Safety & comfort measures maintained. Call bell in reach. Will continue to monitor.

## 2018-10-30 NOTE — ED CDU PROVIDER INITIAL DAY NOTE - OBJECTIVE STATEMENT
37 y/o morbidly obese female PMHx asthma ( three past intubations last when pt was 14 years old) woke up in acute onset of SOB this morning and felt dyspneic while ambulating to the bathroom. Patient took two nebs and used two rescue pumps with minimal relief of symptoms. Last dose at 6:15pm. Patient feels dyspneic while talking and SOB constant at rest. Patient denied CP, fever chills, cough, dizziness, headache     PMD Dr. Bj Dunn 39 y/o morbidly obese female PMHx asthma ( three past intubations last when pt was 14 years old) woke up in acute onset of SOB this morning and felt dyspneic while ambulating to the bathroom. Patient took two nebs and used two rescue pumps with minimal relief of symptoms. Last dose at 6:15pm. Patient feels dyspneic while talking and SOB constant at rest. Patient denied CP, fever chills, cough, dizziness, headache   PMD Dr. Bj Dunn 39 y/o morbidly obese female PMHx asthma ( three past intubations last when pt was 14 years old) woke up in acute onset of SOB this morning and felt dyspneic while ambulating to the bathroom. Patient took two nebs and used two rescue pumps with minimal relief of symptoms. Last dose at 6:15pm. Patient feels dyspneic while talking and SOB constant at rest. Patient denied CP, fever chills, cough, dizziness, headache   PMD Dr. Bj Dunn  In ED, Peak flow 200, report of dyspnea while talking and on ambulation. Pt was given solumedrol 125mg IVP, Mg 2 IVPB and multiple duoneb tx with only mild improvement. Peak flow 325 post treatments in ED and patient sent to CDU for continuous pulse ox, frequent eval, duo nebs and steriods. 39 y/o morbidly obese female PMHx asthma ( three past intubations last when pt was 14 years old) woke up in acute onset of SOB this morning and felt dyspneic while ambulating to the bathroom. Patient took two nebs and used two rescue pumps with minimal relief of symptoms. Last dose at 6:15pm. Patient feels dyspneic while talking and SOB constant at rest. Patient denied recent hospitalization for asthma exacerbation or illness, CP, lower leg swelling/pain, recent travel, fever chills, cough, dizziness, headache or other symptoms.  PMD Dr. Bj Dunn  In ED, Peak flow 200, report of dyspnea while talking and on ambulation. Pt was given solumedrol 125mg IVP, Mg 2 IVPB and multiple duoneb tx with only mild improvement. Peak flow 325 post treatments in ED and patient sent to CDU for continuous pulse ox, frequent eval, duo nebs and steriods.

## 2018-10-30 NOTE — H&P ADULT - PROBLEM SELECTOR PLAN 1
bilateral extensive PE with evidence of right heart strain  MICU evaluation noted  not deemed to be a candidate for MICU  will monitor on Tele  continue IV heparin  will transition to likely oral Apixaban in a day or so once seen by heme  pulmonary, cardiology attending and [facility] evaluation requested   will follow recommendations

## 2018-10-30 NOTE — CONSULT NOTE ADULT - SUBJECTIVE AND OBJECTIVE BOX
Patient is a 38y old  Female who presents with a chief complaint of shortness of breath (30 Oct 2018 14:28)      HPI:  38F h/o asthma, obesity presents with worsening shortness of breath. She states that she was walking yesterday AM and felt sudden onset shortness of breath and almost passed out. She then decided that she would seek medical help and came to the Emergency Department. 2 days prior she states her symptoms started with feeling winded just getting to the bathroom without relief after using her inhalers. 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  She reports recent intentional weight loss of approx. 30 lbs.  She denies any recent leg swelling, fevers, cough, chest pain or palpitations. (30 Oct 2018 14:28)       ROS:  Negative except for: Shortness of breath    PAST MEDICAL & SURGICAL HISTORY:  Morbid obesity  Polyp: rectal  Meniscus tear  Knee dislocation, left, sequela  Vertigo, benign positional  Asthma  Bariatric surgery status  Status post laparoscopic sleeve gastrectomy      SOCIAL HISTORY:    FAMILY HISTORY:  Family history of colon cancer (Uncle)  Family history of colon cancer (Grandparent)      MEDICATIONS  (STANDING):  heparin  Infusion.  Unit(s)/Hr (24 mL/Hr) IV Continuous <Continuous>  sodium chloride 0.9% lock flush 3 milliLiter(s) IV Push every 8 hours  tiotropium 18 MICROgram(s) Capsule 1 Capsule(s) Inhalation daily    MEDICATIONS  (PRN):  ALBUTerol    90 MICROgram(s) HFA Inhaler 2 Puff(s) Inhalation every 6 hours PRN Shortness of Breath and/or Wheezing  heparin  Injectable 85043 Unit(s) IV Push every 6 hours PRN For aPTT less than 40  heparin  Injectable 5000 Unit(s) IV Push every 6 hours PRN For aPTT between 40 - 57      Allergies    pseudoephedrine (Other)  Reglan (Other)  sulfonamides (Hives)    Intolerances        Vital Signs Last 24 Hrs  T(C): 37.4 (30 Oct 2018 12:00), Max: 37.4 (30 Oct 2018 12:00)  T(F): 99.3 (30 Oct 2018 12:00), Max: 99.3 (30 Oct 2018 12:00)  HR: 105 (30 Oct 2018 12:00) (101 - 115)  BP: 130/81 (30 Oct 2018 12:00) (115/82 - 144/102)  BP(mean): --  RR: 22 (30 Oct 2018 12:00) (20 - 30)  SpO2: 96% (30 Oct 2018 12:00) (94% - 100%)    PHYSICAL EXAM  General: adult in NAD  HEENT: clear oropharynx, anicteric sclera, pink conjunctiva  Neck: supple  CV: normal S1/S2 with no murmur rubs or gallops  Lungs: positive air movement b/l ant lungs,clear to auscultation, no wheezes, no rales  Abdomen: soft non-tender non-distended, no hepatosplenomegaly  Ext: no clubbing cyanosis or edema  Skin: no rashes and no petechiae  Neuro: alert and oriented X 4, no focal deficits      LABS:                          12.5   10.5  )-----------( 348      ( 29 Oct 2018 23:10 )             39.6         Mean Cell Volume : 78.4 fl  Mean Cell Hemoglobin : 24.7 pg  Mean Cell Hemoglobin Concentration : 31.5 gm/dL  Auto Neutrophil # : 5.7 K/uL  Auto Lymphocyte # : 3.4 K/uL  Auto Monocyte # : 0.6 K/uL  Auto Eosinophil # : 0.7 K/uL  Auto Basophil # : 0.0 K/uL  Auto Neutrophil % : 54.5 %  Auto Lymphocyte % : 32.9 %  Auto Monocyte % : 5.6 %  Auto Eosinophil % : 6.6 %  Auto Basophil % : 0.5 %      Serial CBC's  10-29 @ 23:10  Hct-39.6 / Hgb-12.5 / Plat-348 / RBC-5.05 / WBC-10.5      10-29    139  |  102  |  17  ----------------------------<  110<H>  4.2   |  22  |  0.75    Ca    9.4      29 Oct 2018 23:10    TPro  8.0  /  Alb  4.3  /  TBili  0.3  /  DBili  x   /  AST  16  /  ALT  15  /  AlkPhos  108  10-29      PT/INR - ( 30 Oct 2018 11:20 )   PT: 13.0 sec;   INR: 1.20 ratio         PTT - ( 30 Oct 2018 11:20 )  PTT:30.7 sec                BLOOD SMEAR INTERPRETATION:       RADIOLOGY & ADDITIONAL STUDIES:  < from: CT Angio Chest w/ IV Cont (10.30.18 @ 10:29) >  EXAM:  CT ANGIO CHEST (W)AW IC                            PROCEDURE DATE:  10/30/2018            INTERPRETATION:  Reason for Exam: Shortness of breath    CTA of the chest was performed from the thoracic inlet to the level of   the adrenal glands following IV contrast injection of Omnipaque 350. No   immediate complications were reported.  MIP images were also created and   reviewed.     Comparison: Chest x-ray of October 29, 2018    Tubes/Lines: None    Mediastinum and Heart: There is enlargement of the pulmonary artery up to   3.8 cm. Aorta is normal in size. There is enlargement of the right heart   chambers with an RV LV ratio greater than 1 consistent with right heart   strain.    Lungs, Pleura, and Airways: Extensive bilateral pulmonary emboli noted   throughout all 5 lobes no associated consolidations or edema.    Visualized Abdomen: Unremarkable    Bones and soft tissues: Unremarkable      < end of copied text >  < from: CT Angio Chest w/ IV Cont (10.30.18 @ 10:29) >  IMPRESSION:    Extensive bilateral pulmonary emboli in all 5 lobes. Associated right   heart strain with a RV LV ratio greater than 1.    < end of copied text >

## 2018-10-30 NOTE — ED ADULT NURSE NOTE - NSIMPLEMENTINTERV_GEN_ALL_ED
Implemented All Universal Safety Interventions:  [place] to call system. Call bell, personal items and telephone within reach. Instruct patient to call for assistance. Room bathroom lighting operational. Non-slip footwear when patient is off stretcher. Physically safe environment: no spills, clutter or unnecessary equipment. Stretcher in lowest position, wheels locked, appropriate side rails in place.

## 2018-10-30 NOTE — CONSULT NOTE ADULT - SUBJECTIVE AND OBJECTIVE BOX
Patient is a 38y old  Female who presents with a chief complaint of shortness of breath (30 Oct 2018 16:11)      HPI:  38F h/o asthma, obesity presents with worsening shortness of breath. She states that she was walking yesterday AM and felt sudden onset shortness of breath and almost passed out. She then decided that she would seek medical help and came to the Emergency Department. 2 days prior she states her symptoms started with feeling winded just getting to the bathroom without relief after using her inhalers. 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  She reports recent intentional weight loss of approx. 30 lbs.  She denies any recent leg swelling, fevers, cough, chest pain or palpitations. (30 Oct 2018 14:28)  she has asthma and usually she takes asthma meds prn : Albuterol which shehas not used since January: Currently she has less SOB: She does not have BENNETT: SHE HAD BEEN TESTED FOR IT:       ?FOLLOWING PRESENT  [x ] Hx of PE/DVT, [x ] Hx COPD, [x ] Hx of Asthma, [x ] Hx of Hospitalization, [x ]  Hx of BiPAP/CPAP use, [x ] Hx of BENNETT    Allergies    pseudoephedrine (Other)  Reglan (Other)  sulfonamides (Hives)    Intolerances        PAST MEDICAL & SURGICAL HISTORY:  Morbid obesity  Polyp: rectal  Meniscus tear  Knee dislocation, left, sequela  Vertigo, benign positional  Asthma  Bariatric surgery status  Status post laparoscopic sleeve gastrectomy      FAMILY HISTORY:  Family history of colon cancer (Uncle)  Family history of colon cancer (Grandparent)  maternal uncle: Esophageal cacner     Mother has sjogren as well as RA       Social History: [ x ] TOBACCO                  [ x ] ETOH                                 [x  ] IVDA/DRUGS    REVIEW OF SYSTEMS      General:	x    Skin/Breast:x  	  Ophthalmologic:x  	  ENMT:	x    Respiratory and Thorax: SOB   	  Cardiovascular:	x    Gastrointestinal:	x    Genitourinary:	x    Musculoskeletal:	xx    Neurological:	x    Psychiatric:	x    Hematology/Lymphatics:	x    Endocrine:	x    Allergic/Immunologic:	x    MEDICATIONS  (STANDING):  heparin  Infusion.  Unit(s)/Hr (24 mL/Hr) IV Continuous <Continuous>  sodium chloride 0.9% lock flush 3 milliLiter(s) IV Push every 8 hours  tiotropium 18 MICROgram(s) Capsule 1 Capsule(s) Inhalation daily    MEDICATIONS  (PRN):  ALBUTerol    90 MICROgram(s) HFA Inhaler 2 Puff(s) Inhalation every 6 hours PRN Shortness of Breath and/or Wheezing  heparin  Injectable 85024 Unit(s) IV Push every 6 hours PRN For aPTT less than 40  heparin  Injectable 5000 Unit(s) IV Push every 6 hours PRN For aPTT between 40 - 57       Vital Signs Last 24 Hrs  T(C): 37.4 (30 Oct 2018 12:00), Max: 37.4 (30 Oct 2018 12:00)  T(F): 99.3 (30 Oct 2018 12:00), Max: 99.3 (30 Oct 2018 12:00)  HR: 105 (30 Oct 2018 12:00) (101 - 115)  BP: 130/81 (30 Oct 2018 12:00) (115/82 - 144/102)  BP(mean): --  RR: 22 (30 Oct 2018 12:00) (20 - 30)  SpO2: 96% (30 Oct 2018 12:00) (94% - 100%)        I&O's Summary      Physical Exam:   GENERAL: NAD, well-groomed, well-developed  HEENT: CRISTI/   Atraumatic, Normocephalic  ENMT: No tonsillar erythema, exudates, or enlargement; Moist mucous membranes, Good dentition, No lesions  NECK: Supple, No JVD, Normal thyroid  CHEST/LUNG: Clear to auscultation bilaterally; No rales, rhonchi, wheezing, or rubs  CVS: Regular rate and rhythm; No murmurs, rubs, or gallops  GI: : Soft, Nontender, Nondistended; Bowel sounds present  NERVOUS SYSTEM:  Alert & Oriented X3  EXTREMITIES:  2+ Peripheral Pulses, No clubbing, cyanosis, or edema  LYMPH: No lymphadenopathy noted  SKIN: No rashes or lesions  ENDOCRINOLOGY: No Thyromegaly  PSYCH: Appropriate    Labs:                              12.5   10.5  )-----------( 348      ( 29 Oct 2018 23:10 )             39.6     10-29    139  |  102  |  17  ----------------------------<  110<H>  4.2   |  22  |  0.75    Ca    9.4      29 Oct 2018 23:10    TPro  8.0  /  Alb  4.3  /  TBili  0.3  /  DBili  x   /  AST  16  /  ALT  15  /  AlkPhos  108  10-29    CAPILLARY BLOOD GLUCOSE        LIVER FUNCTIONS - ( 29 Oct 2018 23:10 )  Alb: 4.3 g/dL / Pro: 8.0 g/dL / ALK PHOS: 108 U/L / ALT: 15 U/L / AST: 16 U/L / GGT: x           PT/INR - ( 30 Oct 2018 11:20 )   PT: 13.0 sec;   INR: 1.20 ratio         PTT - ( 30 Oct 2018 11:20 )  PTT:30.7 sec    D DImer  Serum Pro-Brain Natriuretic Peptide: 442 pg/mL (10-29 @ 23:10)      Studies  Chest X-RAY  CT SCAN Chest   CT Abdomen  Venous Dopplers: LE:   Others    < from: CT Angio Chest w/ IV Cont (10.30.18 @ 10:29) >  Tubes/Lines: None    Mediastinum and Heart: There is enlargement of the pulmonary artery up to   3.8 cm. Aorta is normal in size. There is enlargement of the right heart   chambers with an RV LV ratio greater than 1 consistent with right heart   strain.    Lungs, Pleura, and Airways: Extensive bilateral pulmonary emboli noted   throughout all 5 lobes no associated consolidations or edema.    Visualized Abdomen: Unremarkable    Bones and soft tissues: Unremarkable    IMPRESSION:    Extensive bilateral pulmonary emboli in all 5 lobes. Associated right   heart strain with a RV LV ratio greater than 1.      The findings were discussed with Dr. Cuevas at time of final signing.                     LUIZ ESCALANTE M.D., ATTENDING RADIOLOGIST  This document has been electronically signed. Oct 30 2018 10:35AM    < end of copied text >        < from: Xray Chest 2 Views PA/Lat (10.29.18 @ 23:43) >  EXAM:  XR CHEST PA LAT 2V                            PROCEDURE DATE:  10/29/2018            INTERPRETATION:  CLINICAL INFORMATION: Cough. Shortness of breath. Asthma.    EXAM: Upright frontal and lateral chest from 10/29/2018.    COMPARISON: Chest radiograph from 1/2/2018.    FINDINGS:  The lungs are clear.  There are no pleural effusions or pneumothorax.  Cardiomediastinal silhouette is within normal limits.    IMPRESSION: Clear lungs.                NILE JOSEPH M.D., RADIOLOGY RESIDENT  This document has been electronically signed.  TAE AVENDANO M.D., ATTENDING RADIOLOGIST  This document has been electronically signed. Oct 30 2018 11:08AM        < end of copied text >

## 2018-10-30 NOTE — H&P ADULT - NSHPPHYSICALEXAM_GEN_ALL_CORE
PHYSICAL EXAM: vital signs as above  in no apparent distress obese   HEENT: CRISTI EOMI  Neck: Supple, no JVD, no thyromegaly  Lungs: no rhonchi, no wheeze, no crackles  CVS: S1 S2 no M/R/G  Abdomen: no tenderness, no organomegaly, BS present  Neuro: AO x 3 no focal weakness, no sensory abnormalities  Psych: appropriate affect  Skin: warm, dry  Ext: no cyanosis or clubbing, no edema  Msk: no joint swelling or deformities  Back: no CVA tenderness, no kyphosis/scoliosis

## 2018-10-30 NOTE — H&P ADULT - HISTORY OF PRESENT ILLNESS
38F h/o asthma, obesity presents with worsening shortness of breath. She states that she was walking yesterday AM and felt sudden onset shortness of breath and almost passed out. She then decided that she would seek medical help and came to the Emergency Department. 2 days prior she states her symptoms started with feeling winded just getting to the bathroom without relief after using her inhalers. 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  She reports recent intentional weight loss of approx. 30 lbs.  She denies any recent leg swelling, fevers, cough, chest pain or palpitations.

## 2018-10-30 NOTE — H&P ADULT - ASSESSMENT
38F h/o asthma, obesity presents with worsening shortness of breath failed to improve in the CDU finally diagnosed with bilateral extensive PE

## 2018-10-30 NOTE — H&P ADULT - PMH
Asthma    Knee dislocation, left, sequela    Meniscus tear    Morbid obesity    Polyp  rectal  Vertigo, benign positional

## 2018-10-30 NOTE — CONSULT NOTE ADULT - SUBJECTIVE AND OBJECTIVE BOX
Patient is a 38y old  Female who presents with a chief complaint of shortness of breath (30 Oct 2018 14:28)      HPI:  38F h/o asthma, obesity presents with worsening shortness of breath. She states that she was walking yesterday AM and felt sudden onset shortness of breath and almost passed out. She then decided that she would seek medical help and came to the Emergency Department. 2 days prior she states her symptoms started with feeling winded just getting to the bathroom without relief after using her inhalers. 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  She reports recent intentional weight loss of approx. 30 lbs.  She denies any recent leg swelling, fevers, cough, chest pain or palpitations. (30 Oct 2018 14:28)      ?FOLLOWING PRESENT  [ ] Hx of PE/DVT, [ ] Hx COPD, [ ] Hx of Asthma, [ ] Hx of Hospitalization, [ ]  Hx of BiPAP/CPAP use, [ ] Hx of BENNETT    Allergies    pseudoephedrine (Other)  Reglan (Other)  sulfonamides (Hives)    Intolerances        PAST MEDICAL & SURGICAL HISTORY:  Morbid obesity  Polyp: rectal  Meniscus tear  Knee dislocation, left, sequela  Vertigo, benign positional  Asthma  Bariatric surgery status  Status post laparoscopic sleeve gastrectomy      FAMILY HISTORY:  Family history of colon cancer (Uncle)  Family history of colon cancer (Grandparent)      Social History: [  ] TOBACCO                  [  ] ETOH                                 [  ] IVDA/DRUGS    REVIEW OF SYSTEMS      General:	    Skin/Breast:  	  Ophthalmologic:  	  ENMT:	    Respiratory and Thorax:  	  Cardiovascular:	    Gastrointestinal:	    Genitourinary:	    Musculoskeletal:	    Neurological:	    Psychiatric:	    Hematology/Lymphatics:	    Endocrine:	    Allergic/Immunologic:	    MEDICATIONS  (STANDING):  heparin  Infusion.  Unit(s)/Hr (24 mL/Hr) IV Continuous <Continuous>  sodium chloride 0.9% lock flush 3 milliLiter(s) IV Push every 8 hours  tiotropium 18 MICROgram(s) Capsule 1 Capsule(s) Inhalation daily    MEDICATIONS  (PRN):  ALBUTerol    90 MICROgram(s) HFA Inhaler 2 Puff(s) Inhalation every 6 hours PRN Shortness of Breath and/or Wheezing  heparin  Injectable 50295 Unit(s) IV Push every 6 hours PRN For aPTT less than 40  heparin  Injectable 5000 Unit(s) IV Push every 6 hours PRN For aPTT between 40 - 57       Vital Signs Last 24 Hrs  T(C): 37.4 (30 Oct 2018 12:00), Max: 37.4 (30 Oct 2018 12:00)  T(F): 99.3 (30 Oct 2018 12:00), Max: 99.3 (30 Oct 2018 12:00)  HR: 105 (30 Oct 2018 12:00) (101 - 115)  BP: 130/81 (30 Oct 2018 12:00) (115/82 - 144/102)  BP(mean): --  RR: 22 (30 Oct 2018 12:00) (20 - 30)  SpO2: 96% (30 Oct 2018 12:00) (94% - 100%)        I&O's Summary      Physical Exam:   GENERAL: NAD, well-groomed, well-developed  HEENT: CRISTI/   Atraumatic, Normocephalic  ENMT: No tonsillar erythema, exudates, or enlargement; Moist mucous membranes, Good dentition, No lesions  NECK: Supple, No JVD, Normal thyroid  CHEST/LUNG: Clear to auscultation bilaterally; No rales, rhonchi, wheezing, or rubs  CVS: Regular rate and rhythm; No murmurs, rubs, or gallops  GI: : Soft, Nontender, Nondistended; Bowel sounds present  NERVOUS SYSTEM:  Alert & Oriented X3, Good concentration; Motor Strength 5/5 B/L upper and lower extremities; DTRs 2+ intact and symmetric  EXTREMITIES:  2+ Peripheral Pulses, No clubbing, cyanosis, or edema  LYMPH: No lymphadenopathy noted  SKIN: No rashes or lesions  ENDOCRINOLOGY: No Thyromegaly  PSYCH: Appropriate    Labs:                              12.5   10.5  )-----------( 348      ( 29 Oct 2018 23:10 )             39.6     10-29    139  |  102  |  17  ----------------------------<  110<H>  4.2   |  22  |  0.75    Ca    9.4      29 Oct 2018 23:10    TPro  8.0  /  Alb  4.3  /  TBili  0.3  /  DBili  x   /  AST  16  /  ALT  15  /  AlkPhos  108  10-29    CAPILLARY BLOOD GLUCOSE        LIVER FUNCTIONS - ( 29 Oct 2018 23:10 )  Alb: 4.3 g/dL / Pro: 8.0 g/dL / ALK PHOS: 108 U/L / ALT: 15 U/L / AST: 16 U/L / GGT: x           PT/INR - ( 30 Oct 2018 11:20 )   PT: 13.0 sec;   INR: 1.20 ratio         PTT - ( 30 Oct 2018 11:20 )  PTT:30.7 sec    D DImer  Serum Pro-Brain Natriuretic Peptide: 442 pg/mL (10-29 @ 23:10)      Studies  Chest X-RAY  CT SCAN Chest   CT Abdomen  Venous Dopplers: LE:   Others            < from: CT Angio Chest w/ IV Cont (10.30.18 @ 10:29) >    INTERPRETATION:  Reason for Exam: Shortness of breath    CTA of the chest was performed from the thoracic inlet to the level of   the adrenal glands following IV contrast injection of Omnipaque 350. No   immediate complications were reported.  MIP images were also created and   reviewed.     Comparison: Chest x-ray of October 29, 2018    Tubes/Lines: None    Mediastinum and Heart: There is enlargement of the pulmonary artery up to   3.8 cm. Aorta is normal in size. There is enlargement of the right heart   chambers with an RV LV ratio greater than 1 consistent with right heart   strain.    Lungs, Pleura, and Airways: Extensive bilateral pulmonary emboli noted   throughout all 5 lobes no associated consolidations or edema.    Visualized Abdomen: Unremarkable    Bones and soft tissues: Unremarkable    IMPRESSION:    Extensive bilateral pulmonary emboli in all 5 lobes. Associated right   heart strain with a RV LV ratio greater than 1.      The findings were discussed with Dr. Cuevas at time of final signing.                     LUIZ ESCALANTE M.D., ATTENDING RADIOLOGIST  This document has been electronically signed. Oct 30 2018 10:35AM    < end of copied text >

## 2018-10-30 NOTE — ED CDU PROVIDER INITIAL DAY NOTE - MEDICAL DECISION MAKING DETAILS
38 y old with exacerbation of asthma ,will observe in cdu ,continue breathing treatment ,steroids and on reevaluation: in AM ZR

## 2018-10-31 LAB
ANA PAT FLD IF-IMP: ABNORMAL
ANA TITR SER: ABNORMAL
APTT BLD: 72.3 SEC — HIGH (ref 27.5–36.3)
APTT BLD: 73.9 SEC — HIGH (ref 27.5–36.3)
AUTO DIFF PNL BLD: NEGATIVE — SIGNIFICANT CHANGE UP
B2 GLYCOPROT1 AB SER QL: NEGATIVE — SIGNIFICANT CHANGE UP
C-ANCA SER-ACNC: NEGATIVE — SIGNIFICANT CHANGE UP
CARDIOLIPIN AB SER-ACNC: POSITIVE
CK MB CFR SERPL CALC: 2.7 NG/ML — SIGNIFICANT CHANGE UP (ref 0–3.8)
CK SERPL-CCNC: 50 U/L — SIGNIFICANT CHANGE UP (ref 25–170)
DSDNA AB FLD-ACNC: <0.2 AI — SIGNIFICANT CHANGE UP
ENA SCL70 AB SER-ACNC: <0.2 AI — SIGNIFICANT CHANGE UP
ENA SS-A AB FLD IA-ACNC: <0.2 AI — SIGNIFICANT CHANGE UP
HCT VFR BLD CALC: 38.7 % — SIGNIFICANT CHANGE UP (ref 34.5–45)
HGB BLD-MCNC: 12 G/DL — SIGNIFICANT CHANGE UP (ref 11.5–15.5)
MCHC RBC-ENTMCNC: 24.3 PG — LOW (ref 27–34)
MCHC RBC-ENTMCNC: 31 GM/DL — LOW (ref 32–36)
MCV RBC AUTO: 78.5 FL — LOW (ref 80–100)
P-ANCA SER-ACNC: NEGATIVE — SIGNIFICANT CHANGE UP
PLATELET # BLD AUTO: 389 K/UL — SIGNIFICANT CHANGE UP (ref 150–400)
PROT C ACT/NOR PPP: 111 % — SIGNIFICANT CHANGE UP (ref 74–150)
PROT S FREE AG PPP IA-ACNC: 61 % — SIGNIFICANT CHANGE UP (ref 61–131)
RBC # BLD: 4.93 M/UL — SIGNIFICANT CHANGE UP (ref 3.8–5.2)
RBC # FLD: 14.3 % — SIGNIFICANT CHANGE UP (ref 10.3–14.5)
TROPONIN T, HIGH SENSITIVITY RESULT: 23 NG/L — SIGNIFICANT CHANGE UP (ref 0–51)
WBC # BLD: 17.3 K/UL — HIGH (ref 3.8–10.5)
WBC # FLD AUTO: 17.3 K/UL — HIGH (ref 3.8–10.5)

## 2018-10-31 PROCEDURE — 93010 ELECTROCARDIOGRAM REPORT: CPT

## 2018-10-31 PROCEDURE — 99232 SBSQ HOSP IP/OBS MODERATE 35: CPT

## 2018-10-31 RX ORDER — OXYCODONE HYDROCHLORIDE 5 MG/1
5 TABLET ORAL ONCE
Qty: 0 | Refills: 0 | Status: DISCONTINUED | OUTPATIENT
Start: 2018-10-31 | End: 2018-10-31

## 2018-10-31 RX ORDER — KETOROLAC TROMETHAMINE 30 MG/ML
30 SYRINGE (ML) INJECTION EVERY 8 HOURS
Qty: 0 | Refills: 0 | Status: DISCONTINUED | OUTPATIENT
Start: 2018-10-31 | End: 2018-11-01

## 2018-10-31 RX ADMIN — OXYCODONE HYDROCHLORIDE 5 MILLIGRAM(S): 5 TABLET ORAL at 04:37

## 2018-10-31 RX ADMIN — HEPARIN SODIUM 2100 UNIT(S)/HR: 5000 INJECTION INTRAVENOUS; SUBCUTANEOUS at 01:35

## 2018-10-31 RX ADMIN — Medication 30 MILLIGRAM(S): at 11:30

## 2018-10-31 RX ADMIN — SODIUM CHLORIDE 3 MILLILITER(S): 9 INJECTION INTRAMUSCULAR; INTRAVENOUS; SUBCUTANEOUS at 13:51

## 2018-10-31 RX ADMIN — Medication 30 MILLIGRAM(S): at 10:59

## 2018-10-31 RX ADMIN — SODIUM CHLORIDE 3 MILLILITER(S): 9 INJECTION INTRAMUSCULAR; INTRAVENOUS; SUBCUTANEOUS at 06:33

## 2018-10-31 RX ADMIN — Medication 100 MILLIGRAM(S): at 19:19

## 2018-10-31 RX ADMIN — SODIUM CHLORIDE 3 MILLILITER(S): 9 INJECTION INTRAMUSCULAR; INTRAVENOUS; SUBCUTANEOUS at 22:27

## 2018-10-31 RX ADMIN — OXYCODONE HYDROCHLORIDE 5 MILLIGRAM(S): 5 TABLET ORAL at 05:10

## 2018-10-31 RX ADMIN — HEPARIN SODIUM 2100 UNIT(S)/HR: 5000 INJECTION INTRAVENOUS; SUBCUTANEOUS at 10:12

## 2018-10-31 NOTE — CHART NOTE - NSCHARTNOTEFT_GEN_A_CORE
Called by RN that, pt c/o Midsternal CP. Pt was evaluated at bedside. Pain is 6/10, intermittent, localized in midsternal area, non - radiating to left arm, left shoulder, jaw or back, no alleviating or aggravating factors. Otherwise denies Palpitations/ Diaphoresis, HA/Dizziness/ Blurry vision/ syncope, fever/ chills, Abdominal Pain/N/V/D/C.     PAST MEDICAL & SURGICAL HISTORY:  Morbid obesity  Polyp: rectal  Meniscus tear  Knee dislocation, left, sequela  Vertigo, benign positional  Asthma  Bariatric surgery status  Status post laparoscopic sleeve gastrectomy    MEDICATIONS  (STANDING):  heparin  Infusion.  Unit(s)/Hr (24 mL/Hr) IV Continuous <Continuous>  sodium chloride 0.9% lock flush 3 milliLiter(s) IV Push every 8 hours  tiotropium 18 MICROgram(s) Capsule 1 Capsule(s) Inhalation daily    MEDICATIONS  (PRN):  ALBUTerol    90 MICROgram(s) HFA Inhaler 2 Puff(s) Inhalation every 6 hours PRN Shortness of Breath and/or Wheezing  heparin  Injectable 26124 Unit(s) IV Push every 6 hours PRN For aPTT less than 40  heparin  Injectable 5000 Unit(s) IV Push every 6 hours PRN For aPTT between 40 - 57    Vital Signs Last 24 Hrs  T(C): 36.7 (31 Oct 2018 04:16), Max: 37.4 (30 Oct 2018 12:00)  T(F): 98.1 (31 Oct 2018 04:16), Max: 99.3 (30 Oct 2018 12:00)  HR: 94 (31 Oct 2018 04:16) (90 - 115)  BP: 117/83 (31 Oct 2018 04:16) (115/82 - 132/93)  BP(mean): --  RR: 18 (31 Oct 2018 04:16) (18 - 22)  SpO2: 99% (31 Oct 2018 04:16) (93% - 99%)    PHYSICAL EXAM.   General: WDWN,  NAD  HEENT: NC/AT.   Neck: Supple, No JVD.   Respiratory: CTA B/L, No wheezing, rales, rhonchi.   CV: Tachycardic, regular rhytm, S1S2, no murmurs, rubs or gallops  Abdominal: Obese, Soft, NT, ND +BS  Neuro: A&O X3, Non focal.                           12.3   15.1  )-----------( 369      ( 30 Oct 2018 18:13 )             38.6     10-29    139  |  102  |  17  ----------------------------<  110<H>  4.2   |  22  |  0.75    Ca    9.4      29 Oct 2018 23:10    TPro  8.0  /  Alb  4.3  /  TBili  0.3  /  DBili  x   /  AST  16  /  ALT  15  /  AlkPhos  108  10-29    PT/INR - ( 30 Oct 2018 11:20 )   PT: 13.0 sec;   INR: 1.20 ratio         PTT - ( 31 Oct 2018 01:04 )  PTT:72.3 sec    A/P: 38F h/o asthma, obesity presents with worsening SOB and HERR since last Sunday, most notable yesterday, when she felt winded just getting to the bathroom. She took several puffs of her inhaler and 2 neb treatments at home which did not help her. She states that she has been intubated in the past for her asthma exacerbations when she was young, but recently hadn't had an attack in over 6 months. 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 Denies any recent leg swelling, fevers, cough. Mother has history of multiple autoimmune disorders.    Was given asthma treatments in ER yesterday, CXR clear. Admitted to CDU (obs unit), sent for CTA as was still tachycardic, found to have several PEs. MICU consulted to determine need for thrombolysis. Vitals in CDU: , /90, Temp 98.6, O2 Sat (RA) 94%, RR 22. 38F h/o asthma presents with acute SOB and HERR. Found to have extensive bilateral pulmonary emboli in all 5 lobes, associated right heart strain.     # Chest Pain 2/2 PE.   - EKG: Sinus Tachycardia 108BPM.   - CEX1:   - Oxycodone 5mg PO X1.   - c/w Heparin drip, full AC protocol  - Monitor PTT .   - Workup for hypercoagulability per Hem/ Onc.   - Monitor on Tele.   - f/u TTE.   - As per MICU: No indication for catheter directed thrombolysis/thrombectomy as involvement of multiple vessels.   - f/u Pulm/ Cardio/ Rheum/ MICU recs.   - Will endorse to primary team in rustam BORJA. JOSE ELIAS AGOSTO   # [phone]  Medicine PA.

## 2018-10-31 NOTE — PROVIDER CONTACT NOTE (OTHER) - ACTION/TREATMENT ORDERED:
advised pt to stay in Bed at this time and minimize activity. 2 L of O2 applied, PA notified and aware, continue to monitor advised pt to stay in Bed at this time and minimize activity. 2 L of O2 applied, PA notified and aware, toradol 30mg IV push ordered, continue to monitor

## 2018-10-31 NOTE — PROVIDER CONTACT NOTE (OTHER) - ACTION/TREATMENT ORDERED:
PA made aware. 12 lead ecg completed. Oxycodone 5mg IR ordered. Cardiac enzymes ordered. Continue to monitor patient.

## 2018-10-31 NOTE — PROVIDER CONTACT NOTE (OTHER) - ASSESSMENT
Patient A&Ox4, VSS. HR 94, RR 18, O2 saturation 94% on 2L NC. /83. Patient complaining of chest squeezing left side of midsternum 5/10. Patient states pain woke her up. Patient states feels heart racing. Patient denies lightheadedness or shortness of breath.

## 2018-10-31 NOTE — PROVIDER CONTACT NOTE (OTHER) - ASSESSMENT
pt is a+ox4, pt was in bathroom washing at this time. pt feeling SOB, pt denies chest pain, palpitations n and v. bp 118/88, hr 97, resps 2 pt is a+ox4, pt was in bathroom washing at this time. pt feeling SOB, pt denies chest pain, palpitations n and v. bp 118/88, hr 97, resps 24, pox 93% on ra, oral temp 98.7 pt is a+ox4, pt having 7 out of 10, left sided chest pain. pt lying in Bed at this time. bp 118/88, hr 97, resps 24, pox 93% on ra, oral temp 98.7

## 2018-10-31 NOTE — PROGRESS NOTE ADULT - PROBLEM SELECTOR PLAN 1
cont heparin ? Eliquis: Need to check with pharmacy pt is morbidly obese: Dopplers are negative as well as echo with mild pulm hypertension as well as Mcfarland's sign! NA is slightly high!

## 2018-10-31 NOTE — PROVIDER CONTACT NOTE (OTHER) - ACTION/TREATMENT ORDERED:
PA made aware. 12 lead ecg ordered. PA to assess patient. Continue to monitor patient. PA made aware. 12 lead ecg ordered. PA to assess patient. Oxycodone 5mg IR ordered. Cardiac enzymes ordered. Continue to monitor patient.

## 2018-10-31 NOTE — PROGRESS NOTE ADULT - SUBJECTIVE AND OBJECTIVE BOX
HPI:  38F h/o asthma, obesity presents with worsening shortness of breath. She states that she was walking yesterday AM and felt sudden onset shortness of breath and almost passed out. She then decided that she would seek medical help and came to the Emergency Department. 2 days prior she states her symptoms started with feeling winded just getting to the bathroom without relief after using her inhalers. 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  She reports recent intentional weight loss of approx. 30 lbs.  She denies any recent leg swelling, fevers, cough, chest pain or palpitations. (30 Oct 2018 14:28)     Pt is seen and examined  pt is awake and lying in bed/out of bed to chair  pt seems comfortable and denies any complaints at this time    ROS:  Negative except for: chest pain overnight    MEDICATIONS  (STANDING):  heparin  Infusion.  Unit(s)/Hr (24 mL/Hr) IV Continuous <Continuous>  sodium chloride 0.9% lock flush 3 milliLiter(s) IV Push every 8 hours  tiotropium 18 MICROgram(s) Capsule 1 Capsule(s) Inhalation daily    MEDICATIONS  (PRN):  ALBUTerol    90 MICROgram(s) HFA Inhaler 2 Puff(s) Inhalation every 6 hours PRN Shortness of Breath and/or Wheezing  heparin  Injectable 22237 Unit(s) IV Push every 6 hours PRN For aPTT less than 40  heparin  Injectable 5000 Unit(s) IV Push every 6 hours PRN For aPTT between 40 - 57  ketorolac   Injectable 30 milliGRAM(s) IV Push every 8 hours PRN Moderate Pain (4 - 6)      Allergies    pseudoephedrine (Other)  Reglan (Other)  sulfonamides (Hives)    Intolerances        Vital Signs Last 24 Hrs  T(C): 36.8 (31 Oct 2018 13:18), Max: 37.1 (30 Oct 2018 16:25)  T(F): 98.3 (31 Oct 2018 13:18), Max: 98.7 (30 Oct 2018 16:25)  HR: 104 (31 Oct 2018 13:18) (90 - 104)  BP: 136/75 (31 Oct 2018 13:18) (117/83 - 136/75)  BP(mean): --  RR: 18 (31 Oct 2018 13:18) (18 - 22)  SpO2: 94% (31 Oct 2018 13:18) (93% - 99%)    PHYSICAL EXAM  General: morbidly obese lady  HEENT: clear oropharynx, anicteric sclera, pink conjunctiva  Neck: supple  CV: normal S1/S2 with no murmur rubs or gallops  Lungs: positive air movement b/l ant lungs,clear to auscultation, no wheezes, no rales  Abdomen: soft non-tender non-distended, no hepatosplenomegaly  Ext: no clubbing cyanosis or edema  Skin: no rashes and no petechiae  Neuro: alert and oriented X 4, no focal deficits  LABS:                          12.0   17.3  )-----------( 389      ( 31 Oct 2018 05:23 )             38.7         Mean Cell Volume : 78.5 fl  Mean Cell Hemoglobin : 24.3 pg  Mean Cell Hemoglobin Concentration : 31.0 gm/dL  Auto Neutrophil # : x  Auto Lymphocyte # : x  Auto Monocyte # : x  Auto Eosinophil # : x  Auto Basophil # : x  Auto Neutrophil % : x  Auto Lymphocyte % : x  Auto Monocyte % : x  Auto Eosinophil % : x  Auto Basophil % : x    Serial CBC's  10-31 @ 05:23  Hct-38.7 / Hgb-12.0 / Plat-389 / RBC-4.93 / WBC-17.3          Serial CBC's  10-30 @ 18:13  Hct-38.6 / Hgb-12.3 / Plat-369 / RBC-4.95 / WBC-15.1          Serial CBC's  10-29 @ 23:10  Hct-39.6 / Hgb-12.5 / Plat-348 / RBC-5.05 / WBC-10.5            10-29    139  |  102  |  17  ----------------------------<  110<H>  4.2   |  22  |  0.75    Ca    9.4      29 Oct 2018 23:10    TPro  8.0  /  Alb  4.3  /  TBili  0.3  /  DBili  x   /  AST  16  /  ALT  15  /  AlkPhos  108  10-29      PT/INR - ( 30 Oct 2018 11:20 )   PT: 13.0 sec;   INR: 1.20 ratio         PTT - ( 31 Oct 2018 08:52 )  PTT:73.9 sec              BLOOD SMEAR INTERPRETATION:       RADIOLOGY & ADDITIONAL STUDIES:  < from: TTE with Doppler (w/Cont) (10.30.18 @ 14:30) >  Patient name: ARTURO JOSEPH  YOB: 1980   Age: 38 (F)   MR#: [id number]  Study Date: 10/30/2018  Location: [place] [id number]  DSonographer: Rylie Nichole  [facility]  Study quality: Technically difficult  Referring Physician: Mckenzie Wynn MD  Blood Pressure: 130/81 mmHg  Height: 160 cm  Weight: 118 kg  BSA: 2.2 m2  ------------------------------------------------------------------------  PROCEDURE: Transthoracic echocardiogram with 2-D, M-Mode  and complete spectral and color flow Doppler.Verbal  consent was obtained for injection of  Ultrasonic Enhancing  Agent following a discussion of risks and benefits.  Following intravenous injection of Ultrasonic Enhancing  Agent , harmonic imaging was performed.  INDICATION: Dyspnea, unspecified (R06.00)  ------------------------------------------------------------------------  Dimensions:    Normal Values:  LA:     3.5    2.0 - 4.0 cm  Ao:     3.6    2.0 - 3.8 cm  SEPTUM: 1.2    0.6 - 1.2 cm  PWT:    0.8    0.6 - 1.1 cm  LVIDd:  3.6    3.0 - 5.6 cm  LVIDs:  2.6    1.8 - 4.0 cm  Derived variables:  LVMI: 50 g/m2  RWT: 0.44  Fractional short: 28 %  EF (Visual Estimate): 70 %  Doppler Peak Velocity (m/sec): AoV=1.2  ------------------------------------------------------------------------  Observations:  Mitral Valve: Normal mitral valve. Minimal mitral    < end of copied text >  < from: TTE with Doppler (w/Cont) (10.30.18 @ 14:30) >  regurgitation.  Aortic Valve/Aorta: Normal trileaflet aortic valve. Peak  transaortic valve gradient equals 6 mm Hg. No aortic valve  regurgitation seen. Peak left ventricular outflowtract  gradient equals 4 mm Hg.  Aortic Root: 3.6 cm.  Left Atrium: Left atrium not well visualized, probably  normal.  Left Ventricle: Endocardial visualization enhanced with  intravenous injection of Ultrasonic Enhancing Agent  (Definity).  Normal left ventricular systolic function.  Septal flattening consistent with right ventricular  overload. Normal left ventricular internal dimensions and  wall thicknesses.  Right Heart: Right atrium not well visualized, probably  normal. Right ventricular enlargement with decreased right  ventricular systolic function. Right ventricular systolic  dysfunction with preserved apical wall motion is seen  consistent with Mcfarland's sign. Tricuspid valve not well  visualized, probably normal. Mild-moderate tricuspid  regurgitation. Pulmonic valve not well visualized, probably  normal. Minimal pulmonic regurgitation.  Pericardium/Pleura: Normal pericardium with no pericardial  effusion.  Hemodynamic: Estimated right atrial pressure is 8 mm Hg.  Estimatedright ventricular systolic pressure equals 44 mm  Hg, assuming right atrial pressure equals 8 mm Hg,    < end of copied text >  < from: TTE with Doppler (w/Cont) (10.30.18 @ 14:30) >  consistent with mild pulmonary hypertension.  ------------------------------------------------------------------------  Conclusions:  1. Normal mitral valve. Minimal mitral regurgitation.  2. Normal trileaflet aortic valve. No aortic valve  regurgitation seen.  3. Endocardial visualization enhanced with intravenous  injection of Ultrasonic Enhancing Agent (Definity).  Normal  left ventricular systolic function. Septal flattening  consistent with right ventricular overload.  4. Right ventricular enlargement with decreased right  ventricular systolic function. Right ventricular systolic  dysfunction with preserved apical wall motion is seen  consistent with Mcfarland's sign.  5. Estimated pulmonary artery systolic pressure equals 44  mm Hg, assuming right atrial pressure equals 8 mm Hg,  consistent with mild pulmonary pressures.  *** No previous Echo exam.  ------------------------------------------------------------------------  Confirmed on  10/30/2018 - 16:27:50 by Douglas Peryr M.D.    < end of copied text >

## 2018-10-31 NOTE — PROGRESS NOTE ADULT - ASSESSMENT
38F h/o asthma, obese lady presents with bilateral extensive PE. Right heart strain pattern.  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 No personal or family history of VTE. Mother with rheumatoid arthritis and Sjogrens.   At present apart from morbid obesity no other obvious cause identified.  Will order complete hypercoagulable workup including rheumatologic workup. Follow the results of B/L LE dopplers and ECHO. Cardiology and Pulmonary consults. Will follow the case.    Problem/Recommendation - 1:  Problem: Pulmonary embolism.  10/31/18 Patient on UFH. Hypercoagulable workup in progress. Will need at least 9 months of Anticoagulation.  Reason for chest pain not clear, cardiology on the case.  Problem/Recommendation - 2:  ·  Problem: Asthma.     Problem/Recommendation - 3:  ·  Problem: Morbid obesity.

## 2018-10-31 NOTE — PROGRESS NOTE ADULT - PROBLEM SELECTOR PLAN 1
bilateral extensive PE with evidence of right heart strain  chest pain overnight likely secondary to fragmentation of clot burden and reactive pleuritis   will treat with IV Toradol  continue IV heparin today  likely change to Apixaban tomorrow   heme help appreciated   will monitor on Tele

## 2018-10-31 NOTE — PROGRESS NOTE ADULT - ASSESSMENT
38F h/o asthma, obesity presents with acute SOB, found to have extensive b/l multilobar PE with associate right heart strain.    1. Multilobar PE with right heart strain    CTA chest revealing extensive bl PE in all 5 lobes with associated right heart strain  ECHO w preserved LVEF, decreased RVEF and + fernandez's sign consistent w RV strain  LE dopplers negative  cv stable with no chest pain, SOB improving, no decomp CHF on exam.  continue with A/C  heme/rheum eval for hypercoagulability/MCTD/?malignancy work up       2. Tachycardia   in the setting of bl PE   hemodynamically stable, asymptomatic        dvt  ppx

## 2018-10-31 NOTE — PROGRESS NOTE ADULT - SUBJECTIVE AND OBJECTIVE BOX
Patient is a 38y old  Female who presents with a chief complaint of shortness of breath (30 Oct 2018 16:24)      Any change in ROS: Still feels some chest pain on breathing:  otherwise she feels OK     MEDICATIONS  (STANDING):  heparin  Infusion.  Unit(s)/Hr (24 mL/Hr) IV Continuous <Continuous>  sodium chloride 0.9% lock flush 3 milliLiter(s) IV Push every 8 hours  tiotropium 18 MICROgram(s) Capsule 1 Capsule(s) Inhalation daily    MEDICATIONS  (PRN):  ALBUTerol    90 MICROgram(s) HFA Inhaler 2 Puff(s) Inhalation every 6 hours PRN Shortness of Breath and/or Wheezing  heparin  Injectable 78103 Unit(s) IV Push every 6 hours PRN For aPTT less than 40  heparin  Injectable 5000 Unit(s) IV Push every 6 hours PRN For aPTT between 40 - 57  ketorolac   Injectable 30 milliGRAM(s) IV Push every 8 hours PRN Moderate Pain (4 - 6)    Vital Signs Last 24 Hrs  T(C): 37.1 (31 Oct 2018 10:10), Max: 37.4 (30 Oct 2018 12:00)  T(F): 98.7 (31 Oct 2018 10:10), Max: 99.3 (30 Oct 2018 12:00)  HR: 97 (31 Oct 2018 10:10) (90 - 115)  BP: 118/88 (31 Oct 2018 10:10) (117/83 - 132/93)  BP(mean): --  RR: 20 (31 Oct 2018 10:10) (18 - 22)  SpO2: 93% (31 Oct 2018 10:10) (93% - 99%)    I&O's Summary    30 Oct 2018 07:01  -  31 Oct 2018 07:00  --------------------------------------------------------  IN: 693 mL / OUT: 0 mL / NET: 693 mL          Physical Exam:   GENERAL: NAD, well-groomed, well-developed  HEENT: CRISTI/   Atraumatic, Normocephalic  ENMT: No tonsillar erythema, exudates, or enlargement; Moist mucous membranes, Good dentition, No lesions  NECK: Supple, No JVD, Normal thyroid  CHEST/LUNG: Clear to auscultaion, ; No rales, rhonchi, wheezing, or rubs  CVS: Regular rate and rhythm; No murmurs, rubs, or gallops  GI: : Soft, Nontender, Nondistended; Bowel sounds present  NERVOUS SYSTEM:  Alert & Oriented X3  EXTREMITIES:  2+ Peripheral Pulses, No clubbing, cyanosis, or edema  LYMPH: No lymphadenopathy noted  SKIN: No rashes or lesions  ENDOCRINOLOGY: No Thyromegaly  PSYCH: Appropriate    Labs:    CARDIAC MARKERS ( 31 Oct 2018 05:23 )  x     / x     / 50 U/L / x     / 2.7 ng/mL                            12.0   17.3  )-----------( 389      ( 31 Oct 2018 05:23 )             38.7                         12.3   15.1  )-----------( 369      ( 30 Oct 2018 18:13 )             38.6                         12.5   10.5  )-----------( 348      ( 29 Oct 2018 23:10 )             39.6     10-29    139  |  102  |  17  ----------------------------<  110<H>  4.2   |  22  |  0.75    Ca    9.4      29 Oct 2018 23:10    TPro  8.0  /  Alb  4.3  /  TBili  0.3  /  DBili  x   /  AST  16  /  ALT  15  /  AlkPhos  108  10-29    CAPILLARY BLOOD GLUCOSE          LIVER FUNCTIONS - ( 29 Oct 2018 23:10 )  Alb: 4.3 g/dL / Pro: 8.0 g/dL / ALK PHOS: 108 U/L / ALT: 15 U/L / AST: 16 U/L / GGT: x           PT/INR - ( 30 Oct 2018 11:20 )   PT: 13.0 sec;   INR: 1.20 ratio         PTT - ( 31 Oct 2018 08:52 )  PTT:73.9 sec    Serum Pro-Brain Natriuretic Peptide: 442 pg/mL (10-29 @ 23:10)        RECENT CULTURES:        RESPIRATORY CULTURES:    < from: [facility] Duplex Lower Ext Vein Scan, Bilat (10.30.18 @ 15:46) >    INTERPRETATION:  CLINICAL INFORMATION: Short of breath, PE, rule out DVT    COMPARISON: None available.    TECHNIQUE: Duplex sonography of the BILATERAL LOWER extremities with   color and spectral Doppler, with and without compression.      FINDINGS: There is edema within the superficial soft tissues of the right   lower extremity.    There is normal compressibility of the bilateral common femoral, femoral   and popliteal veins.     Doppler examination shows normal spontaneous and phasic flow.    The right peroneal veins are not diagnostically imaged.    The right posterior tibial and the left posterior tibial and peroneal   veins are patent and free of thrombus.    IMPRESSION:     No evidence of bilateral lower extremity deep venous thrombosis.      < from: TTE with Doppler (w/Cont) (10.30.18 @ 14:30) >  tent with Mcfarland's sign. Tricuspid valve not well  visualized, probably normal. Mild-moderate tricuspid  regurgitation. Pulmonic valve not well visualized, probably  normal. Minimal pulmonic regurgitation.  Pericardium/Pleura: Normal pericardium with no pericardial  effusion.  Hemodynamic: Estimated right atrial pressure is 8 mm Hg.  Estimatedright ventricular systolic pressure equals 44 mm  Hg, assuming right atrial pressure equals 8 mm Hg,  consistent with mild pulmonary hypertension.  ------------------------------------------------------------------------  Conclusions:  1. Normal mitral valve. Minimal mitral regurgitation.  2. Normal trileaflet aortic valve. No aortic valve  regurgitation seen.  3. Endocardial visualization enhanced with intravenous  injection of Ultrasonic Enhancing Agent (Definity).  Normal  left ventricular systolic function. Septal flattening  consistent with right ventricular overload.  4. Right ventricular enlargement with decreased right  ventricular systolic function. Right ventricular systolic  dysfunction with preserved apical wall motion is seen  consistent with Mcfarland's sign.  5. Estimated pulmonary artery systolic pressure equals 44  mm Hg, assuming right atrial pressure equals 8 mm Hg,  consistent with mild pulmonary pressures.  *** No previous Echo exam.  ------------------------------------------------------------------------  Confirmed on  10/30/2018 - 16:27:50 by Douglas Perry M.D.  ------------------------------------------------------------------------    < end of copied text >                    BRITTANY MATSON M.D., ATTENDING RADIOLOGIST  This document has been electronically signed. Oct 30 2018  5:38PM    < end of copied text >        Studies  Chest X-RAY  CT SCAN Chest   Venous Dopplers: LE:   CT Abdomen  Others

## 2018-10-31 NOTE — PROGRESS NOTE ADULT - SUBJECTIVE AND OBJECTIVE BOX
CC: no cp/sob    TELEMETRY: NSR    PHYSICAL EXAM:    T(C): 37.1 (10-31-18 @ 10:10), Max: 37.4 (10-30-18 @ 12:00)  HR: 97 (10-31-18 @ 10:10) (90 - 105)  BP: 118/88 (10-31-18 @ 10:10) (117/83 - 132/93)  RR: 20 (10-31-18 @ 10:10) (18 - 22)  SpO2: 93% (10-31-18 @ 10:10) (93% - 99%)  Wt(kg): --  I&O's Summary    30 Oct 2018 07:01  -  31 Oct 2018 07:00  --------------------------------------------------------  IN: 693 mL / OUT: 0 mL / NET: 693 mL        Appearance: Normal	  Cardiovascular: Normal S1 S2,RRR, No JVD, No murmurs  Respiratory: Lungs clear to auscultation	  Gastrointestinal:  Soft, Non-tender, + BS	  Extremities: Normal range of motion, No clubbing, cyanosis or edema  Vascular: Peripheral pulses palpable 2+ bilaterally     LABS:	 	                          12.0   17.3  )-----------( 389      ( 31 Oct 2018 05:23 )             38.7     10-29    139  |  102  |  17  ----------------------------<  110<H>  4.2   |  22  |  0.75    Ca    9.4      29 Oct 2018 23:10    TPro  8.0  /  Alb  4.3  /  TBili  0.3  /  DBili  x   /  AST  16  /  ALT  15  /  AlkPhos  108  10-29      PT/INR - ( 30 Oct 2018 11:20 )   PT: 13.0 sec;   INR: 1.20 ratio         PTT - ( 31 Oct 2018 08:52 )  PTT:73.9 sec    CARDIAC MARKERS:

## 2018-11-01 ENCOUNTER — TRANSCRIPTION ENCOUNTER (OUTPATIENT)
Age: 38
End: 2018-11-01

## 2018-11-01 LAB
ANION GAP SERPL CALC-SCNC: 10 MMOL/L — SIGNIFICANT CHANGE UP (ref 5–17)
APTT BLD: 74.9 SEC — HIGH (ref 27.5–36.3)
BUN SERPL-MCNC: 20 MG/DL — SIGNIFICANT CHANGE UP (ref 7–23)
CALCIUM SERPL-MCNC: 9.4 MG/DL — SIGNIFICANT CHANGE UP (ref 8.4–10.5)
CARDIOLIPIN IGM SER-MCNC: <5 GPL — SIGNIFICANT CHANGE UP (ref 0–12.5)
CARDIOLIPIN IGM SER-MCNC: <5 MPL — SIGNIFICANT CHANGE UP (ref 0–12.5)
CHLORIDE SERPL-SCNC: 102 MMOL/L — SIGNIFICANT CHANGE UP (ref 96–108)
CO2 SERPL-SCNC: 25 MMOL/L — SIGNIFICANT CHANGE UP (ref 22–31)
CREAT SERPL-MCNC: 0.89 MG/DL — SIGNIFICANT CHANGE UP (ref 0.5–1.3)
DEPRECATED CARDIOLIPIN IGA SER: 5.4 APL — SIGNIFICANT CHANGE UP (ref 0–12.5)
DSDNA AB SER-ACNC: <12 IU/ML — SIGNIFICANT CHANGE UP
GLUCOSE SERPL-MCNC: 99 MG/DL — SIGNIFICANT CHANGE UP (ref 70–99)
HCT VFR BLD CALC: 36.7 % — SIGNIFICANT CHANGE UP (ref 34.5–45)
HGB BLD-MCNC: 11.5 G/DL — SIGNIFICANT CHANGE UP (ref 11.5–15.5)
MCHC RBC-ENTMCNC: 24.6 PG — LOW (ref 27–34)
MCHC RBC-ENTMCNC: 31.2 GM/DL — LOW (ref 32–36)
MCV RBC AUTO: 78.9 FL — LOW (ref 80–100)
PLATELET # BLD AUTO: 312 K/UL — SIGNIFICANT CHANGE UP (ref 150–400)
POTASSIUM SERPL-MCNC: 4.9 MMOL/L — SIGNIFICANT CHANGE UP (ref 3.5–5.3)
POTASSIUM SERPL-SCNC: 4.9 MMOL/L — SIGNIFICANT CHANGE UP (ref 3.5–5.3)
RBC # BLD: 4.66 M/UL — SIGNIFICANT CHANGE UP (ref 3.8–5.2)
RBC # FLD: 14.4 % — SIGNIFICANT CHANGE UP (ref 10.3–14.5)
SODIUM SERPL-SCNC: 137 MMOL/L — SIGNIFICANT CHANGE UP (ref 135–145)
WBC # BLD: 10.6 K/UL — HIGH (ref 3.8–10.5)
WBC # FLD AUTO: 10.6 K/UL — HIGH (ref 3.8–10.5)

## 2018-11-01 RX ORDER — APIXABAN 2.5 MG/1
1 TABLET, FILM COATED ORAL
Qty: 60 | Refills: 0 | OUTPATIENT
Start: 2018-11-01 | End: 2018-11-30

## 2018-11-01 RX ORDER — OXYCODONE HYDROCHLORIDE 5 MG/1
5 TABLET ORAL ONCE
Qty: 0 | Refills: 0 | Status: DISCONTINUED | OUTPATIENT
Start: 2018-11-01 | End: 2018-11-01

## 2018-11-01 RX ORDER — APIXABAN 2.5 MG/1
10 TABLET, FILM COATED ORAL EVERY 12 HOURS
Qty: 0 | Refills: 0 | Status: DISCONTINUED | OUTPATIENT
Start: 2018-11-01 | End: 2018-11-02

## 2018-11-01 RX ORDER — ALBUTEROL 90 UG/1
2 AEROSOL, METERED ORAL
Qty: 0 | Refills: 0 | DISCHARGE
Start: 2018-11-01

## 2018-11-01 RX ADMIN — SODIUM CHLORIDE 3 MILLILITER(S): 9 INJECTION INTRAMUSCULAR; INTRAVENOUS; SUBCUTANEOUS at 21:28

## 2018-11-01 RX ADMIN — APIXABAN 10 MILLIGRAM(S): 2.5 TABLET, FILM COATED ORAL at 21:27

## 2018-11-01 RX ADMIN — SODIUM CHLORIDE 3 MILLILITER(S): 9 INJECTION INTRAMUSCULAR; INTRAVENOUS; SUBCUTANEOUS at 15:09

## 2018-11-01 RX ADMIN — APIXABAN 10 MILLIGRAM(S): 2.5 TABLET, FILM COATED ORAL at 12:12

## 2018-11-01 RX ADMIN — HEPARIN SODIUM 2100 UNIT(S)/HR: 5000 INJECTION INTRAVENOUS; SUBCUTANEOUS at 08:26

## 2018-11-01 RX ADMIN — OXYCODONE HYDROCHLORIDE 5 MILLIGRAM(S): 5 TABLET ORAL at 21:07

## 2018-11-01 RX ADMIN — OXYCODONE HYDROCHLORIDE 5 MILLIGRAM(S): 5 TABLET ORAL at 20:29

## 2018-11-01 RX ADMIN — SODIUM CHLORIDE 3 MILLILITER(S): 9 INJECTION INTRAMUSCULAR; INTRAVENOUS; SUBCUTANEOUS at 05:41

## 2018-11-01 NOTE — DISCHARGE NOTE ADULT - HOSPITAL COURSE
38F h/o asthma, obesity presents with worsening shortness of breath failed to improve in the CDU finally diagnosed with bilateral extensive PE, Cardiology consulted. Patient started on Heparin drip now transitioned to Eliquis 10mg BID 38F h/o asthma, obesity presents with worsening shortness of breath failed to improve in the CDU finally diagnosed with bilateral extensive PE, Cardiology consulted. Patient started on Heparin drip now transitioned to Eliquis 10mg BID for 7 days, 5 mg bid for 30 days. She was seen by MICWALTER, Pulm, card. She is hemodynamically stable to be discharged today. She will go back to work as full duty from Thursday (11/08/18). Spoke to Attending.

## 2018-11-01 NOTE — PROGRESS NOTE ADULT - SUBJECTIVE AND OBJECTIVE BOX
Patient is a 38y old  Female who presents with a chief complaint of shortness of breath (31 Oct 2018 16:03)      Any change in ROS: Feeling much better today     MEDICATIONS  (STANDING):  heparin  Infusion.  Unit(s)/Hr (24 mL/Hr) IV Continuous <Continuous>  sodium chloride 0.9% lock flush 3 milliLiter(s) IV Push every 8 hours  tiotropium 18 MICROgram(s) Capsule 1 Capsule(s) Inhalation daily    MEDICATIONS  (PRN):  ALBUTerol    90 MICROgram(s) HFA Inhaler 2 Puff(s) Inhalation every 6 hours PRN Shortness of Breath and/or Wheezing  benzonatate 100 milliGRAM(s) Oral three times a day PRN Cough  heparin  Injectable 95212 Unit(s) IV Push every 6 hours PRN For aPTT less than 40  heparin  Injectable 5000 Unit(s) IV Push every 6 hours PRN For aPTT between 40 - 57  ketorolac   Injectable 30 milliGRAM(s) IV Push every 8 hours PRN Moderate Pain (4 - 6)    Vital Signs Last 24 Hrs  T(C): 36.7 (01 Nov 2018 04:41), Max: 36.8 (31 Oct 2018 13:18)  T(F): 98 (01 Nov 2018 04:41), Max: 98.3 (31 Oct 2018 13:18)  HR: 96 (31 Oct 2018 21:10) (96 - 105)  BP: 106/78 (01 Nov 2018 04:41) (100/71 - 136/75)  BP(mean): --  RR: 18 (01 Nov 2018 04:41) (18 - 18)  SpO2: 95% (01 Nov 2018 04:41) (94% - 100%)    I&O's Summary    31 Oct 2018 07:01  -  01 Nov 2018 07:00  --------------------------------------------------------  IN: 1943 mL / OUT: 0 mL / NET: 1943 mL          Physical Exam:   GENERAL: NAD, well-groomed, well-developed  HEENT: CRISTI/   Atraumatic, Normocephalic  ENMT: No tonsillar erythema, exudates, or enlargement; Moist mucous membranes, Good dentition, No lesions  NECK: Supple, No JVD, Normal thyroid  CHEST/LUNG: Clear to auscultaion, ; No rales, rhonchi, wheezing, or rubs  CVS: Regular rate and rhythm; No murmurs, rubs, or gallops  GI: : Soft, Nontender, Nondistended; Bowel sounds present  NERVOUS SYSTEM:  Alert & Oriented X3, Good concentration; Motor Strength 5/5 B/L upper and lower extremities; DTRs 2+ intact and symmetric  EXTREMITIES:  2+ Peripheral Pulses, No clubbing, cyanosis, or edema  LYMPH: No lymphadenopathy noted  SKIN: No rashes or lesions  ENDOCRINOLOGY: No Thyromegaly  PSYCH: Appropriate    Labs:    CARDIAC MARKERS ( 31 Oct 2018 05:23 )  x     / x     / 50 U/L / x     / 2.7 ng/mL                            11.5   10.6  )-----------( 312      ( 01 Nov 2018 06:52 )             36.7                         12.0   17.3  )-----------( 389      ( 31 Oct 2018 05:23 )             38.7                         12.3   15.1  )-----------( 369      ( 30 Oct 2018 18:13 )             38.6                         12.5   10.5  )-----------( 348      ( 29 Oct 2018 23:10 )             39.6     11-01    137  |  102  |  20  ----------------------------<  99  4.9   |  25  |  0.89  10-29    139  |  102  |  17  ----------------------------<  110<H>  4.2   |  22  |  0.75    Ca    9.4      01 Nov 2018 06:52    TPro  8.0  /  Alb  4.3  /  TBili  0.3  /  DBili  x   /  AST  16  /  ALT  15  /  AlkPhos  108  10-29    CAPILLARY BLOOD GLUCOSE            PT/INR - ( 30 Oct 2018 11:20 )   PT: 13.0 sec;   INR: 1.20 ratio         PTT - ( 01 Nov 2018 06:52 )  PTT:74.9 sec    Serum Pro-Brain Natriuretic Peptide: 442 pg/mL (10-29 @ 23:10)    < from: [facility] Duplex Lower Ext Vein Scan, Bilat (10.30.18 @ 15:46) >    EXAM:  DUPLEX SCAN EXT VEINS LOWER BI                            PROCEDURE DATE:  10/30/2018            INTERPRETATION:  CLINICAL INFORMATION: Short of breath, PE, rule out DVT    COMPARISON: None available.    TECHNIQUE: Duplex sonography of the BILATERAL LOWER extremities with   color and spectral Doppler, with and without compression.      FINDINGS: There is edema within the superficial soft tissues of the right   lower extremity.    There is normal compressibility of the bilateral common femoral, femoral   and popliteal veins.     Doppler examination shows normal spontaneous and phasic flow.    The right peroneal veins are not diagnostically imaged.    The right posterior tibial and the left posterior tibial and peroneal   veins are patent and free of thrombus.    IMPRESSION:     No evidence of bilateral lower extremity deep venous thrombosis.            < from: TTE with Doppler (w/Cont) (10.30.18 @ 14:30) >  Right Heart: Right atrium not well visualized, probably  normal. Right ventricular enlargement with decreased right  ventricular systolic function. Right ventricular systolic  dysfunction with preserved apical wall motion is seen  consistent with Mcfarland's sign. Tricuspid valve not well  visualized, probably normal. Mild-moderate tricuspid  regurgitation. Pulmonic valve not well visualized, probably  normal. Minimal pulmonic regurgitation.  Pericardium/Pleura: Normal pericardium with no pericardial  effusion.  Hemodynamic: Estimated right atrial pressure is 8 mm Hg.  Estimatedright ventricular systolic pressure equals 44 mm  Hg, assuming right atrial pressure equals 8 mm Hg,  consistent with mild pulmonary hypertension.  ------------------------------------------------------------------------  Conclusions:  1. Normal mitral valve. Minimal mitral regurgitation.  2. Normal trileaflet aortic valve. No aortic valve  regurgitation seen.  3. Endocardial visualization enhanced with intravenous  injection of Ultrasonic Enhancing Agent (Definity).  Normal  left ventricular systolic function. Septal flattening  consistent with right ventricular overload.  4. Right ventricular enlargement with decreased right  ventricular systolic function. Right ventricular systolic  dysfunction with preserved apical wall motion is seen  consistent with Mcfarland's sign.  5. Estimated pulmonary artery systolic pressure equals 44  mm Hg, assuming right atrial pressure equals 8 mm Hg,  consistent with mild pulmonary pressures.  *** No previous Echo exam.  ------------------------------------------------------------------------  Confirmed on  10/30/2018 - 16:27:50 by Douglas Perry M.D.  ------------------------------------------------------------------------    < end of copied text >              BRITTANY MATSON M.D., ATTENDING RADIOLOGIST  This document has been electronically signed. Oct 30 2018  5:38PM    < end of copied text >      RECENT CULTURES:        RESPIRATORY CULTURES:          Studies  Chest X-RAY  CT SCAN Chest   Venous Dopplers: LE:   CT Abdomen  Others

## 2018-11-01 NOTE — PROGRESS NOTE ADULT - PROBLEM SELECTOR PLAN 1
cont heparin ? Eliquis: Need to check with pharmacy pt is morbidly obese: Dopplers are negative as well as echo with mild pulm hypertension as well as Mcfarland's sign! NA is slightly high!  1/1: Anticardiolipin antibody present: ? start coumadinization from today Rate controlled  -c/w metoprolol er 50mg po qd   -c/w holding coumadin 3 mg po qhs   goal INR 2-3 currently supra therapeutic INR  -f/u INR in the am

## 2018-11-01 NOTE — PROGRESS NOTE ADULT - ASSESSMENT
38F h/o asthma, obesity presents with acute SOB, found to have extensive b/l multilobar PE with associate right heart strain.    1. Multilobar PE with right heart strain  CTA chest revealing extensive bl PE in all 5 lobes with associated right heart strain  ECHO w preserved LVEF, decreased RVEF and + fernandez's sign consistent w RV strain  LE dopplers negative  cv stable with no chest pain, SOB improving, no decomp CHF on exam.  continue with A/C  heme f/u, hypercoagulable workup including rheumatologic workup in progress     2. Tachycardia, resolved  in the setting of bl PE   hemodynamically stable, asymptomatic      3. Atypical chest pain   pleuritic in the setting of PE   events from yesterday now, currently cp free, no SOB ]  pain control , no events on tele     dvt  ppx 38F h/o asthma, obesity presents with acute SOB, found to have extensive b/l multilobar PE with associate right heart strain.    1. Multilobar PE with right heart strain  CTA chest revealing extensive bl PE in all 5 lobes with associated right heart strain  ECHO w preserved LVEF, decreased RVEF and + fernandez's sign consistent w RV strain  LE dopplers negative  cv stable with no chest pain, SOB improving, no decomp CHF on exam.  continue with A/C  heme f/u, hypercoagulable workup including rheumatologic workup in progress     2. Tachycardia, resolved  in the setting of bl PE   hemodynamically stable, asymptomatic      3. Atypical chest pain   pleuritic in the setting of PE   events from yesterday now, currently cp free, no SOB   pain control , no events on tele     dvt  ppx

## 2018-11-01 NOTE — DISCHARGE NOTE ADULT - MEDICATION SUMMARY - MEDICATIONS TO TAKE
I will START or STAY ON the medications listed below when I get home from the hospital:    benzonatate 100 mg oral capsule  -- 1 cap(s) by mouth 3 times a day, As needed, Cough  -- Indication: For Shortness of breath    albuterol 90 mcg/inh inhalation aerosol  -- 2 puff(s) inhaled every 6 hours, As needed, Shortness of Breath and/or Wheezing  -- Indication: For Asthma    albuterol 90 mcg/inh inhalation aerosol  -- 2 puff(s) inhaled 4 times a day  -- Indication: For Asthma I will START or STAY ON the medications listed below when I get home from the hospital:    To Whom It May Concern  -- 1   -- Indication: For job letter    Eliquis 5 mg oral tablet  -- 1 tab(s) by mouth 2 times a day (see instruction):   2 tabs twice day for 6 days then decrease to 1 tab twice a day for 30 days.   -- Check with your doctor before becoming pregnant.  It is very important that you take or use this exactly as directed.  Do not skip doses or discontinue unless directed by your doctor.  Obtain medical advice before taking any non-prescription drugs as some may affect the action of this medication.    -- Indication: For Pulmonary embolism    benzonatate 100 mg oral capsule  -- 1 cap(s) by mouth 3 times a day, As needed, Cough  -- Indication: For Shortness of breath    albuterol 90 mcg/inh inhalation aerosol  -- 2 puff(s) inhaled every 6 hours, As needed, Shortness of Breath and/or Wheezing  -- Indication: For Asthma    albuterol 90 mcg/inh inhalation aerosol  -- 2 puff(s) inhaled 4 times a day  -- Indication: For Asthma I will START or STAY ON the medications listed below when I get home from the hospital:    To Whom It May Concern  -- 1   -- Indication: For job letter    Eliquis 5 mg oral tablet  -- 1 tab(s) by mouth 2 times a day (see instruction):   2 tabs twice day for 6 days then decrease to 1 tab twice a day for 30 days.   -- Check with your doctor before becoming pregnant.  It is very important that you take or use this exactly as directed.  Do not skip doses or discontinue unless directed by your doctor.  Obtain medical advice before taking any non-prescription drugs as some may affect the action of this medication.    -- Indication: For Pulmonary embolism    benzonatate 100 mg oral capsule  -- 1 cap(s) by mouth 3 times a day, As needed, Cough  -- Indication: For Shortness of breath    albuterol 90 mcg/inh inhalation aerosol  -- 2 puff(s) inhaled every 6 hours, As needed, Shortness of Breath and/or Wheezing  -- Indication: For Asthma    albuterol 90 mcg/inh inhalation aerosol  -- 2 puff(s) inhaled 4 times a day x 10 days  -- Indication: For Asthma

## 2018-11-01 NOTE — DISCHARGE NOTE ADULT - CARE PLAN
Principal Discharge DX:	Pulmonary embolism  Goal:	stable  Assessment and plan of treatment:	cont Eliquis, follow up with PCP/Hm-onc and cardiologist, pulmonologist next week  Secondary Diagnosis:	Asthma  Goal:	stable  Assessment and plan of treatment:	cont inhaler as prescribed and as needed

## 2018-11-01 NOTE — DISCHARGE NOTE ADULT - PHYSICIAN SECTION COMPLETE
Attempted to complete initial screen. Pt states she is not interested at this time. States thank you and hung up the phone. Pt not enrolled in [facility] at this time.    Yes

## 2018-11-01 NOTE — PROGRESS NOTE ADULT - SUBJECTIVE AND OBJECTIVE BOX
Patient is a 38y old  Female who presents with a chief complaint of shortness of breath (01 Nov 2018 10:31)      SUBJECTIVE / OVERNIGHT EVENTS: no overnight events  feels a lot better    ROS:  Resp: No cough no sputum production  CVS: No chest pain no palpitations no orthopnea  GI: no N/V/D  : no dysuria, no hematuria  Neuro: no weakness no paresthesias  Heme: No petechiae no easy bruising  Msk: No joint pain no swelling  Skin: No rash no itching        MEDICATIONS  (STANDING):  apixaban 10 milliGRAM(s) Oral every 12 hours  sodium chloride 0.9% lock flush 3 milliLiter(s) IV Push every 8 hours  tiotropium 18 MICROgram(s) Capsule 1 Capsule(s) Inhalation daily    MEDICATIONS  (PRN):  ALBUTerol    90 MICROgram(s) HFA Inhaler 2 Puff(s) Inhalation every 6 hours PRN Shortness of Breath and/or Wheezing  benzonatate 100 milliGRAM(s) Oral three times a day PRN Cough        CAPILLARY BLOOD GLUCOSE        I&O's Summary    31 Oct 2018 07:01  -  01 Nov 2018 07:00  --------------------------------------------------------  IN: 1943 mL / OUT: 0 mL / NET: 1943 mL        Vital Signs Last 24 Hrs  T(C): 36.7 (01 Nov 2018 04:41), Max: 36.8 (31 Oct 2018 13:18)  T(F): 98 (01 Nov 2018 04:41), Max: 98.3 (31 Oct 2018 13:18)  HR: 96 (31 Oct 2018 21:10) (96 - 105)  BP: 106/78 (01 Nov 2018 04:41) (100/71 - 136/75)  BP(mean): --  RR: 18 (01 Nov 2018 04:41) (18 - 18)  SpO2: 95% (01 Nov 2018 04:41) (94% - 100%)    PHYSICAL EXAM: vital signs as above  in no apparent distress obese   HEENT: CRISTI EOMI  Neck: Supple, no JVD, no thyromegaly  Lungs: no rhonchi, no wheeze, no crackles  CVS: S1 S2 no M/R/G  Abdomen: no tenderness, no organomegaly, BS present  Neuro: AO x 3 no focal weakness, no sensory abnormalities  Psych: appropriate affect  Skin: warm, dry  Ext: no cyanosis or clubbing, no edema  Msk: no joint swelling or deformities  Back: no CVA tenderness, no kyphosis/scoliosis    LABS:                        11.5   10.6  )-----------( 312      ( 01 Nov 2018 06:52 )             36.7     11-01    137  |  102  |  20  ----------------------------<  99  4.9   |  25  |  0.89    Ca    9.4      01 Nov 2018 06:52      PTT - ( 01 Nov 2018 06:52 )  PTT:74.9 sec  CARDIAC MARKERS ( 31 Oct 2018 05:23 )  x     / x     / 50 U/L / x     / 2.7 ng/mL            All consultant(s) notes reviewed and care discussed with other providers    Contact Number, Dr Meehan [phone]

## 2018-11-01 NOTE — DISCHARGE NOTE ADULT - PLAN OF CARE
stable cont Eliquis, follow up with PCP/Hm-onc and cardiologist, pulmonologist next week cont inhaler as prescribed and as needed

## 2018-11-01 NOTE — PROGRESS NOTE ADULT - ASSESSMENT
38F h/o asthma, obese lady presents with bilateral extensive PE. Right heart strain pattern.  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 No personal or family history of VTE. Mother with rheumatoid arthritis and Sjogrens.   At present apart from morbid obesity no other obvious cause identified.  Will order complete hypercoagulable workup including rheumatologic workup. Follow the results of B/L LE dopplers and ECHO. Cardiology and Pulmonary consults. Will follow the case.    Problem/Recommendation - 1:  Problem: Pulmonary embolism.  10/31/18 Patient on UFH. Hypercoagulable workup in progress. Will need at least 9 months of Anticoagulation.  Reason for chest pain not clear, cardiology on the case.  11/1/18 Hypercoagulable workup showed positive anticardiolipin antibody. This is a type of antiphospholipid antibody and is associated with thrombosis. 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 With family history of Sjogrens in her mother she needs to follow with rheumatology to rule out underlying CTD. Patient should be on long term anticoagulation. Coumadin is widely studied and recommended in such situation. This was explained to the patient but she is refusing coumadin. So started on Eliquis from today. Patient will follow as outpt post discharge.  Problem/Recommendation - 2:  ·  Problem: Asthma.     Problem/Recommendation - 3:  ·  Problem: Morbid obesity.

## 2018-11-01 NOTE — PROGRESS NOTE ADULT - SUBJECTIVE AND OBJECTIVE BOX
HPI:  38F h/o asthma, obesity presents with worsening shortness of breath. She states that she was walking yesterday AM and felt sudden onset shortness of breath and almost passed out. She then decided that she would seek medical help and came to the Emergency Department. 2 days prior she states her symptoms started with feeling winded just getting to the bathroom without relief after using her inhalers. 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  She reports recent intentional weight loss of approx. 30 lbs.  She denies any recent leg swelling, fevers, cough, chest pain or palpitations. (30 Oct 2018 14:28)     Pt is seen and examined  pt is awake and lying in bed/out of bed to chair  pt seems comfortable and denies any complaints at this time    ROS:  Negative except for: mild shortness of breath    MEDICATIONS  (STANDING):  apixaban 10 milliGRAM(s) Oral every 12 hours  sodium chloride 0.9% lock flush 3 milliLiter(s) IV Push every 8 hours  tiotropium 18 MICROgram(s) Capsule 1 Capsule(s) Inhalation daily    MEDICATIONS  (PRN):  ALBUTerol    90 MICROgram(s) HFA Inhaler 2 Puff(s) Inhalation every 6 hours PRN Shortness of Breath and/or Wheezing  benzonatate 100 milliGRAM(s) Oral three times a day PRN Cough      Allergies    pseudoephedrine (Other)  Reglan (Other)  sulfonamides (Hives)    Intolerances        Vital Signs Last 24 Hrs  T(C): 36.6 (01 Nov 2018 12:07), Max: 36.7 (31 Oct 2018 21:10)  T(F): 97.8 (01 Nov 2018 12:07), Max: 98 (31 Oct 2018 21:10)  HR: 89 (01 Nov 2018 12:07) (89 - 96)  BP: 122/83 (01 Nov 2018 12:07) (105/75 - 122/83)  BP(mean): --  RR: 18 (01 Nov 2018 12:07) (18 - 18)  SpO2: 96% (01 Nov 2018 12:07) (95% - 96%)    PHYSICAL EXAM  General: adult in NAD  HEENT: clear oropharynx, anicteric sclera, pink conjunctiva  Neck: supple  CV: normal S1/S2 with no murmur rubs or gallops  Lungs: positive air movement b/l ant lungs,clear to auscultation, no wheezes, no rales  Abdomen: soft non-tender non-distended, no hepatosplenomegaly  Ext: no clubbing cyanosis or edema  Skin: no rashes and no petechiae  Neuro: alert and oriented X 4, no focal deficits  LABS:                          11.5   10.6  )-----------( 312      ( 01 Nov 2018 06:52 )             36.7         Mean Cell Volume : 78.9 fl  Mean Cell Hemoglobin : 24.6 pg  Mean Cell Hemoglobin Concentration : 31.2 gm/dL  Auto Neutrophil # : x  Auto Lymphocyte # : x  Auto Monocyte # : x  Auto Eosinophil # : x  Auto Basophil # : x  Auto Neutrophil % : x  Auto Lymphocyte % : x  Auto Monocyte % : x  Auto Eosinophil % : x  Auto Basophil % : x    Serial CBC's  11-01 @ 06:52  Hct-36.7 / Hgb-11.5 / Plat-312 / RBC-4.66 / WBC-10.6          Serial CBC's  10-31 @ 05:23  Hct-38.7 / Hgb-12.0 / Plat-389 / RBC-4.93 / WBC-17.3          Serial CBC's  10-30 @ 18:13  Hct-38.6 / Hgb-12.3 / Plat-369 / RBC-4.95 / WBC-15.1          Serial CBC's  10-29 @ 23:10  Hct-39.6 / Hgb-12.5 / Plat-348 / RBC-5.05 / WBC-10.5            11-01    137  |  102  |  20  ----------------------------<  99  4.9   |  25  |  0.89    Ca    9.4      01 Nov 2018 06:52        PTT - ( 01 Nov 2018 06:52 )  PTT:74.9 sec              BLOOD SMEAR INTERPRETATION:       RADIOLOGY & ADDITIONAL STUDIES:

## 2018-11-01 NOTE — PROGRESS NOTE ADULT - SUBJECTIVE AND OBJECTIVE BOX
CARDIOLOGY FOLLOW UP - Dr. Tavarez    CC no chest pain or sob, feels better  events yesterday noted       PHYSICAL EXAM:  T(C): 36.7 (11-01-18 @ 04:41), Max: 36.8 (10-31-18 @ 13:18)  HR: 96 (10-31-18 @ 21:10) (96 - 105)  BP: 106/78 (11-01-18 @ 04:41) (100/71 - 136/75)  RR: 18 (11-01-18 @ 04:41) (18 - 18)  SpO2: 95% (11-01-18 @ 04:41) (94% - 100%)  Wt(kg): --  I&O's Summary    31 Oct 2018 07:01  -  01 Nov 2018 07:00  --------------------------------------------------------  IN: 1943 mL / OUT: 0 mL / NET: 1943 mL        Appearance: Normal	  Cardiovascular: Normal S1 S2,RRR, No JVD, No murmurs  Respiratory: Lungs clear to auscultation	  Gastrointestinal:  Soft, Non-tender, + BS	  Extremities: Normal range of motion, No clubbing, cyanosis or edema        MEDICATIONS  (STANDING):  heparin  Infusion.  Unit(s)/Hr (24 mL/Hr) IV Continuous <Continuous>  sodium chloride 0.9% lock flush 3 milliLiter(s) IV Push every 8 hours  tiotropium 18 MICROgram(s) Capsule 1 Capsule(s) Inhalation daily      TELEMETRY: 	    ECG:  	NSR- SB HR 55- 80  RADIOLOGY:   DIAGNOSTIC TESTING:  [ ] Echocardiogram:  [ ]  Catheterization:  [ ] Stress Test:    OTHER: 	    LABS:	 	                                11.5   10.6  )-----------( 312      ( 01 Nov 2018 06:52 )             36.7     11-01    137  |  102  |  20  ----------------------------<  99  4.9   |  25  |  0.89    Ca    9.4      01 Nov 2018 06:52      PT/INR - ( 30 Oct 2018 11:20 )   PT: 13.0 sec;   INR: 1.20 ratio         PTT - ( 01 Nov 2018 06:52 )  PTT:74.9 sec CARDIOLOGY FOLLOW UP - Dr. Tavarez    CC no chest pain or sob, feels better  events from yesterday noted       PHYSICAL EXAM:  T(C): 36.7 (11-01-18 @ 04:41), Max: 36.8 (10-31-18 @ 13:18)  HR: 96 (10-31-18 @ 21:10) (96 - 105)  BP: 106/78 (11-01-18 @ 04:41) (100/71 - 136/75)  RR: 18 (11-01-18 @ 04:41) (18 - 18)  SpO2: 95% (11-01-18 @ 04:41) (94% - 100%)  Wt(kg): --  I&O's Summary    31 Oct 2018 07:01  -  01 Nov 2018 07:00  --------------------------------------------------------  IN: 1943 mL / OUT: 0 mL / NET: 1943 mL        Appearance: Normal	  Cardiovascular: Normal S1 S2,RRR, No JVD, No murmurs  Respiratory: Lungs clear to auscultation	  Gastrointestinal:  Soft, Non-tender, + BS	  Extremities: Normal range of motion, No clubbing, cyanosis or edema        MEDICATIONS  (STANDING):  heparin  Infusion.  Unit(s)/Hr (24 mL/Hr) IV Continuous <Continuous>  sodium chloride 0.9% lock flush 3 milliLiter(s) IV Push every 8 hours  tiotropium 18 MICROgram(s) Capsule 1 Capsule(s) Inhalation daily      TELEMETRY: 	    ECG:  	NSR- SB HR 55- 80  RADIOLOGY:   DIAGNOSTIC TESTING:  [ ] Echocardiogram:  [ ]  Catheterization:  [ ] Stress Test:    OTHER: 	    LABS:	 	                                11.5   10.6  )-----------( 312      ( 01 Nov 2018 06:52 )             36.7     11-01    137  |  102  |  20  ----------------------------<  99  4.9   |  25  |  0.89    Ca    9.4      01 Nov 2018 06:52      PT/INR - ( 30 Oct 2018 11:20 )   PT: 13.0 sec;   INR: 1.20 ratio         PTT - ( 01 Nov 2018 06:52 )  PTT:74.9 sec

## 2018-11-01 NOTE — DISCHARGE NOTE ADULT - CARE PROVIDER_API CALL
Luc Dowling), Critical Care Medicine; Internal Medicine; Pulmonary Disease  [address] [place] [address]  Phone: [phone]  Fax: [phone]    Sage Dowling), Hematology; [facility]; Internal Medicine; Medical Oncology  [address] [place] [address]  Phone: [phone]  Fax: [phone]    Abilio Tavarez), Cardiology; Internal Medicine  [address] [facility]  [address]  [place] [address]  Phone: [phone]  Fax: [phone]

## 2018-11-01 NOTE — PROGRESS NOTE ADULT - PROBLEM SELECTOR PLAN 1
much improved  air hunger resolved  discontinue heparin  Protein C and S normal much improved  air hunger resolved  discontinue heparin  Protein C and S normal  start Apixaban 10 BID  likely lifelong anticoagulation  anticardiolipin ab positive  possible lupus  will await dsDNA

## 2018-11-01 NOTE — DISCHARGE NOTE ADULT - PATIENT PORTAL LINK FT
You can access the [facility] Patient Portal, offered by [facility], by registering with the following website: [email or web address]

## 2018-11-02 VITALS
TEMPERATURE: 98 F | RESPIRATION RATE: 18 BRPM | DIASTOLIC BLOOD PRESSURE: 68 MMHG | OXYGEN SATURATION: 97 % | HEART RATE: 87 BPM | SYSTOLIC BLOOD PRESSURE: 100 MMHG

## 2018-11-02 LAB
ANION GAP SERPL CALC-SCNC: 10 MMOL/L — SIGNIFICANT CHANGE UP (ref 5–17)
APTT BLD: 31.6 SEC — SIGNIFICANT CHANGE UP (ref 27.5–36.3)
BUN SERPL-MCNC: 17 MG/DL — SIGNIFICANT CHANGE UP (ref 7–23)
CALCIUM SERPL-MCNC: 9.3 MG/DL — SIGNIFICANT CHANGE UP (ref 8.4–10.5)
CHLORIDE SERPL-SCNC: 104 MMOL/L — SIGNIFICANT CHANGE UP (ref 96–108)
CO2 SERPL-SCNC: 24 MMOL/L — SIGNIFICANT CHANGE UP (ref 22–31)
CREAT SERPL-MCNC: 0.88 MG/DL — SIGNIFICANT CHANGE UP (ref 0.5–1.3)
GLUCOSE SERPL-MCNC: 95 MG/DL — SIGNIFICANT CHANGE UP (ref 70–99)
HCT VFR BLD CALC: 37 % — SIGNIFICANT CHANGE UP (ref 34.5–45)
HGB BLD-MCNC: 11.3 G/DL — LOW (ref 11.5–15.5)
MCHC RBC-ENTMCNC: 23.9 PG — LOW (ref 27–34)
MCHC RBC-ENTMCNC: 30.6 GM/DL — LOW (ref 32–36)
MCV RBC AUTO: 78.1 FL — LOW (ref 80–100)
PLATELET # BLD AUTO: 329 K/UL — SIGNIFICANT CHANGE UP (ref 150–400)
POTASSIUM SERPL-MCNC: 5 MMOL/L — SIGNIFICANT CHANGE UP (ref 3.5–5.3)
POTASSIUM SERPL-SCNC: 5 MMOL/L — SIGNIFICANT CHANGE UP (ref 3.5–5.3)
RBC # BLD: 4.74 M/UL — SIGNIFICANT CHANGE UP (ref 3.8–5.2)
RBC # FLD: 13.9 % — SIGNIFICANT CHANGE UP (ref 10.3–14.5)
SODIUM SERPL-SCNC: 138 MMOL/L — SIGNIFICANT CHANGE UP (ref 135–145)
WBC # BLD: 9.4 K/UL — SIGNIFICANT CHANGE UP (ref 3.8–10.5)
WBC # FLD AUTO: 9.4 K/UL — SIGNIFICANT CHANGE UP (ref 3.8–10.5)

## 2018-11-02 PROCEDURE — 99285 EMERGENCY DEPT VISIT HI MDM: CPT | Mod: 25

## 2018-11-02 PROCEDURE — 85730 THROMBOPLASTIN TIME PARTIAL: CPT

## 2018-11-02 PROCEDURE — 96376 TX/PRO/DX INJ SAME DRUG ADON: CPT

## 2018-11-02 PROCEDURE — 96365 THER/PROPH/DIAG IV INF INIT: CPT

## 2018-11-02 PROCEDURE — 85303 CLOT INHIBIT PROT C ACTIVITY: CPT

## 2018-11-02 PROCEDURE — 85027 COMPLETE CBC AUTOMATED: CPT

## 2018-11-02 PROCEDURE — 86235 NUCLEAR ANTIGEN ANTIBODY: CPT

## 2018-11-02 PROCEDURE — 84484 ASSAY OF TROPONIN QUANT: CPT

## 2018-11-02 PROCEDURE — 94640 AIRWAY INHALATION TREATMENT: CPT

## 2018-11-02 PROCEDURE — 85610 PROTHROMBIN TIME: CPT

## 2018-11-02 PROCEDURE — 82553 CREATINE MB FRACTION: CPT

## 2018-11-02 PROCEDURE — 86146 BETA-2 GLYCOPROTEIN ANTIBODY: CPT

## 2018-11-02 PROCEDURE — 86225 DNA ANTIBODY NATIVE: CPT

## 2018-11-02 PROCEDURE — 86038 ANTINUCLEAR ANTIBODIES: CPT

## 2018-11-02 PROCEDURE — 87581 M.PNEUMON DNA AMP PROBE: CPT

## 2018-11-02 PROCEDURE — 81240 F2 GENE: CPT

## 2018-11-02 PROCEDURE — 96366 THER/PROPH/DIAG IV INF ADDON: CPT

## 2018-11-02 PROCEDURE — 81241 F5 GENE: CPT

## 2018-11-02 PROCEDURE — 87633 RESP VIRUS 12-25 TARGETS: CPT

## 2018-11-02 PROCEDURE — 87486 CHLMYD PNEUM DNA AMP PROBE: CPT

## 2018-11-02 PROCEDURE — G0378: CPT

## 2018-11-02 PROCEDURE — 71275 CT ANGIOGRAPHY CHEST: CPT

## 2018-11-02 PROCEDURE — 96375 TX/PRO/DX INJ NEW DRUG ADDON: CPT

## 2018-11-02 PROCEDURE — 83880 ASSAY OF NATRIURETIC PEPTIDE: CPT

## 2018-11-02 PROCEDURE — 93005 ELECTROCARDIOGRAM TRACING: CPT

## 2018-11-02 PROCEDURE — 86147 CARDIOLIPIN ANTIBODY EA IG: CPT

## 2018-11-02 PROCEDURE — 93970 EXTREMITY STUDY: CPT

## 2018-11-02 PROCEDURE — 71046 X-RAY EXAM CHEST 2 VIEWS: CPT

## 2018-11-02 PROCEDURE — 85306 CLOT INHIBIT PROT S FREE: CPT

## 2018-11-02 PROCEDURE — 80048 BASIC METABOLIC PNL TOTAL CA: CPT

## 2018-11-02 PROCEDURE — 82550 ASSAY OF CK (CPK): CPT

## 2018-11-02 PROCEDURE — C8929: CPT

## 2018-11-02 PROCEDURE — 86036 ANCA SCREEN EACH ANTIBODY: CPT

## 2018-11-02 PROCEDURE — 80053 COMPREHEN METABOLIC PANEL: CPT

## 2018-11-02 PROCEDURE — 87798 DETECT AGENT NOS DNA AMP: CPT

## 2018-11-02 RX ORDER — ALBUTEROL 90 UG/1
2 AEROSOL, METERED ORAL
Qty: 2 | Refills: 0 | OUTPATIENT
Start: 2018-11-02 | End: 2018-11-11

## 2018-11-02 RX ORDER — ALBUTEROL 90 UG/1
2 AEROSOL, METERED ORAL
Qty: 0 | Refills: 0 | COMMUNITY

## 2018-11-02 RX ORDER — APIXABAN 2.5 MG/1
1 TABLET, FILM COATED ORAL
Qty: 72 | Refills: 0
Start: 2018-11-02 | End: 2018-12-07

## 2018-11-02 RX ADMIN — APIXABAN 10 MILLIGRAM(S): 2.5 TABLET, FILM COATED ORAL at 10:17

## 2018-11-02 RX ADMIN — SODIUM CHLORIDE 3 MILLILITER(S): 9 INJECTION INTRAMUSCULAR; INTRAVENOUS; SUBCUTANEOUS at 07:00

## 2018-11-02 RX ADMIN — SODIUM CHLORIDE 3 MILLILITER(S): 9 INJECTION INTRAMUSCULAR; INTRAVENOUS; SUBCUTANEOUS at 13:33

## 2018-11-02 NOTE — PROGRESS NOTE ADULT - PROBLEM SELECTOR PLAN 2
resolving
resolving  11/2: Resolved!!
secondary to extensive bilateral PE  continue O2 as needed
secondary to extensive bilateral PE  continue O2 as needed
secondary to extensive bilateral PE  much improved  no need for home O2
resolving

## 2018-11-02 NOTE — PROGRESS NOTE ADULT - PROVIDER SPECIALTY LIST ADULT
Cardiology
Cardiology
Heme/Onc
Heme/Onc
Internal Medicine
Internal Medicine
Pulmonology
Cardiology
Internal Medicine

## 2018-11-02 NOTE — PROGRESS NOTE ADULT - PROBLEM SELECTOR PLAN 4
counselled:
counselled:
states has already lost 30 lbs  will continue to encourage to continue losing wt as outpatient
counselled:

## 2018-11-02 NOTE — PROGRESS NOTE ADULT - PROBLEM SELECTOR PROBLEM 2
Shortness of breath

## 2018-11-02 NOTE — PROVIDER CONTACT NOTE (OTHER) - BACKGROUND
Patient admitted for extensive PE's
pe
Patient admitted for extensive b/l PE's.
pt admitted for SOB, hx: asthma

## 2018-11-02 NOTE — PROGRESS NOTE ADULT - ATTENDING COMMENTS
Patient seen and examined.  Agree with above NP note.  cv stable  resolved cp and improved dyspnea  hd stable   no acute rhf   d/c planning
Agree with above NP note.  cv stable  echo with rv dysfxn  hd stable   no acute rhf  cont a/c
discussed with patient in detail, all questions answered
discussed with patient in detail, all questions answered  11/14: +Anti cardiolipin antibody: ? Start coumadinization today: DIOAC's studies are still lacking in APL syndrome: She is young and should she plan to become pregnant: warfarin can be switched to Lovenox
discussed with patient in detail, all questions answered  11/14: +Anti cardiolipin antibody: ? Start coumadinization today: DIOAC's studies are still lacking in APL syndrome: She is young and should she plan to become pregnant: warfarin can be switched to Lovenox  11/2: pt refused for coumadin: Decided to be on eliquis: Explained to pt in detail about coumadin as well as DOAC's
discussed with patient in detail, all questions answered

## 2018-11-02 NOTE — PROGRESS NOTE ADULT - ASSESSMENT
38F h/o asthma, obesity presents with acute SOB, found to have extensive b/l multilobar PE with associate right heart strain.    1. Multilobar PE with right heart strain  CTA chest revealing extensive bl PE in all 5 lobes with associated right heart strain  ECHO w preserved LVEF, decreased RVEF and + fernandez's sign consistent w RV strain  LE dopplers negative  cv stable with no chest pain, SOB improving, no decomp CHF on exam.  continue with A/C  heme f/u, hypercoagulable workup including rheumatologic workup in progress     2. Tachycardia, resolved  in the setting of bl PE   hemodynamically stable, asymptomatic      3. Atypical chest pain   pleuritic in the setting of PE   no further cp noted   pain control , no events on tele     dvt  ppx  d/c planning per primary team 38F h/o asthma, obesity presents with acute SOB, found to have extensive b/l multilobar PE with associate right heart strain.    1. Multilobar PE with right heart strain  CTA chest revealing extensive bl PE in all 5 lobes with associated right heart strain  ECHO w preserved LVEF, decreased RVEF and + fernandez's sign consistent w RV strain  LE dopplers negative  cv stable with no chest pain, SOB improving, no decomp CHF on exam.  continue with A/C  heme f/u, hypercoagulable workup including rheumatologic workup in progress     2. Tachycardia, resolved  in the setting of bl PE   hemodynamically stable, asymptomatic      3. Atypical chest pain   pleuritic in the setting of PE   no further cp noted   pain control , no events on tele     dvt  ppx  d/c planning per primary team, f/u appt. scheduled for 11/13 @ 3:45

## 2018-11-02 NOTE — PROGRESS NOTE ADULT - PROBLEM SELECTOR PLAN 1
SLE work up negative essentially except low titer CHINO positive   and anticardiolipin ab positive for  heme help appreciated  tolerating Apixaban  will continue loading dose x 7 days then maintenance dose of 5 BID likely lifelong  will defer duration to heme  discharge home  venous duplex negative residual DVT  weight loss reinforced in detail to further reduce risk of recurrence

## 2018-11-02 NOTE — PROGRESS NOTE ADULT - PROBLEM SELECTOR PLAN 1
cont heparin ? Eliquis: Need to check with pharmacy pt is morbidly obese: Dopplers are negative as well as echo with mild pulm hypertension as well as Mcfarland's sign! NA is slightly high!  1/1: Anticardiolipin antibody present: ? start coumadinization from today  11/2: pt does not want coumadin: decided to be on eliquis

## 2018-11-02 NOTE — PROGRESS NOTE ADULT - SUBJECTIVE AND OBJECTIVE BOX
Patient is a 38y old  Female who presents with a chief complaint of shortness of breath (02 Nov 2018 10:14)      SUBJECTIVE / OVERNIGHT EVENTS: no overnight events  feels much improved     ROS:  Resp: No cough no sputum production  CVS: No chest pain no palpitations no orthopnea  GI: no N/V/D  : no dysuria, no hematuria  Neuro: no weakness no paresthesias  Heme: No petechiae no easy bruising  Msk: No joint pain no swelling  Skin: No rash no itching        MEDICATIONS  (STANDING):  apixaban 10 milliGRAM(s) Oral every 12 hours  sodium chloride 0.9% lock flush 3 milliLiter(s) IV Push every 8 hours  tiotropium 18 MICROgram(s) Capsule 1 Capsule(s) Inhalation daily    MEDICATIONS  (PRN):  ALBUTerol    90 MICROgram(s) HFA Inhaler 2 Puff(s) Inhalation every 6 hours PRN Shortness of Breath and/or Wheezing  benzonatate 100 milliGRAM(s) Oral three times a day PRN Cough        CAPILLARY BLOOD GLUCOSE        I&O's Summary    01 Nov 2018 07:01  -  02 Nov 2018 07:00  --------------------------------------------------------  IN: 420 mL / OUT: 0 mL / NET: 420 mL    02 Nov 2018 07:01  -  02 Nov 2018 14:43  --------------------------------------------------------  IN: 420 mL / OUT: 0 mL / NET: 420 mL        Vital Signs Last 24 Hrs  T(C): 36.8 (02 Nov 2018 11:55), Max: 36.8 (02 Nov 2018 11:55)  T(F): 98.2 (02 Nov 2018 11:55), Max: 98.2 (02 Nov 2018 11:55)  HR: 87 (02 Nov 2018 11:55) (80 - 89)  BP: 100/68 (02 Nov 2018 11:55) (100/68 - 112/76)  BP(mean): --  RR: 18 (02 Nov 2018 11:55) (18 - 18)  SpO2: 97% (02 Nov 2018 11:55) (96% - 97%)    PHYSICAL EXAM: vital signs as above  in no apparent distress obese   HEENT: CRISTI EOMI  Neck: Supple, no JVD, no thyromegaly  Lungs: no rhonchi, no wheeze, no crackles  CVS: S1 S2 no M/R/G  Abdomen: no tenderness, no organomegaly, BS present  Neuro: AO x 3 no focal weakness, no sensory abnormalities  Psych: appropriate affect  Skin: warm, dry  Ext: no cyanosis or clubbing, no edema  Msk: no joint swelling or deformities  Back: no CVA tenderness, no kyphosis/scoliosis    LABS:                        11.3   9.4   )-----------( 329      ( 02 Nov 2018 06:16 )             37.0     11-02    138  |  104  |  17  ----------------------------<  95  5.0   |  24  |  0.88    Ca    9.3      02 Nov 2018 06:13      PTT - ( 02 Nov 2018 06:14 )  PTT:31.6 sec            All consultant(s) notes reviewed and care discussed with other providers    Contact Number, Dr Meehan [phone]

## 2018-11-02 NOTE — PROGRESS NOTE ADULT - REASON FOR ADMISSION
shortness of breath

## 2018-11-02 NOTE — PROGRESS NOTE ADULT - PROBLEM SELECTOR PLAN 3
no exacerbation: Albuerol prn
no exacerbation: Albuerol prn
no wheezing  inhaler PRN   pulmonary  help appreciated
no exacerbation: Albuerol prn

## 2018-11-02 NOTE — PROGRESS NOTE ADULT - SUBJECTIVE AND OBJECTIVE BOX
Patient is a 38y old  Female who presents with a chief complaint of shortness of breath (02 Nov 2018 09:58)      Any change in ROS: Doing ok     MEDICATIONS  (STANDING):  apixaban 10 milliGRAM(s) Oral every 12 hours  sodium chloride 0.9% lock flush 3 milliLiter(s) IV Push every 8 hours  tiotropium 18 MICROgram(s) Capsule 1 Capsule(s) Inhalation daily    MEDICATIONS  (PRN):  ALBUTerol    90 MICROgram(s) HFA Inhaler 2 Puff(s) Inhalation every 6 hours PRN Shortness of Breath and/or Wheezing  benzonatate 100 milliGRAM(s) Oral three times a day PRN Cough    Vital Signs Last 24 Hrs  T(C): 36.7 (02 Nov 2018 04:44), Max: 36.7 (01 Nov 2018 20:58)  T(F): 98 (02 Nov 2018 04:44), Max: 98.1 (01 Nov 2018 20:58)  HR: 80 (02 Nov 2018 04:44) (80 - 89)  BP: 112/76 (02 Nov 2018 04:44) (101/69 - 122/83)  BP(mean): --  RR: 18 (02 Nov 2018 04:44) (18 - 18)  SpO2: 96% (02 Nov 2018 04:44) (96% - 96%)    I&O's Summary    01 Nov 2018 07:01  -  02 Nov 2018 07:00  --------------------------------------------------------  IN: 420 mL / OUT: 0 mL / NET: 420 mL          Physical Exam:   GENERAL: NAD, well-groomed, well-developed  HEENT: CRISTI/   Atraumatic, Normocephalic  ENMT: No tonsillar erythema, exudates, or enlargement; Moist mucous membranes, Good dentition, No lesions  NECK: Supple, No JVD, Normal thyroid  CHEST/LUNG: Clear to auscultaion, ; No rales, rhonchi, wheezing, or rubs  CVS: Regular rate and rhythm; No murmurs, rubs, or gallops  GI: : Soft, Nontender, Nondistended; Bowel sounds present  NERVOUS SYSTEM:  Alert & Oriented X3, Good concentration; Motor Strength 5/5 B/L upper and lower extremities; DTRs 2+ intact and symmetric  EXTREMITIES:  2+ Peripheral Pulses, No clubbing, cyanosis, or edema  LYMPH: No lymphadenopathy noted  SKIN: No rashes or lesions  ENDOCRINOLOGY: No Thyromegaly  PSYCH: Appropriate    Labs:                              11.3   9.4   )-----------( 329      ( 02 Nov 2018 06:16 )             37.0                         11.5   10.6  )-----------( 312      ( 01 Nov 2018 06:52 )             36.7                         12.0   17.3  )-----------( 389      ( 31 Oct 2018 05:23 )             38.7                         12.3   15.1  )-----------( 369      ( 30 Oct 2018 18:13 )             38.6                         12.5   10.5  )-----------( 348      ( 29 Oct 2018 23:10 )             39.6     11-02    138  |  104  |  17  ----------------------------<  95  5.0   |  24  |  0.88  11-01    137  |  102  |  20  ----------------------------<  99  4.9   |  25  |  0.89  10-29    139  |  102  |  17  ----------------------------<  110<H>  4.2   |  22  |  0.75    Ca    9.3      02 Nov 2018 06:13  Ca    9.4      01 Nov 2018 06:52    TPro  8.0  /  Alb  4.3  /  TBili  0.3  /  DBili  x   /  AST  16  /  ALT  15  /  AlkPhos  108  10-29    CAPILLARY BLOOD GLUCOSE      < from: [facility] Duplex Lower Ext Vein ScanNeelima (10.30.18 @ 15:46) >  EXAM:  DUPLEX SCAN EXT VEINS LOWER BI                            PROCEDURE DATE:  10/30/2018            INTERPRETATION:  CLINICAL INFORMATION: Short of breath, PE, rule out DVT    COMPARISON: None available.    TECHNIQUE: Duplex sonography of the BILATERAL LOWER extremities with   color and spectral Doppler, with and without compression.      FINDINGS: There is edema within the superficial soft tissues of the right   lower extremity.    There is normal compressibility of the bilateral common femoral, femoral   and popliteal veins.     Doppler examination shows normal spontaneous and phasic flow.    The right peroneal veins are not diagnostically imaged.    The right posterior tibial and the left posterior tibial and peroneal   veins are patent and free of thrombus.    IMPRESSION:     No evidence of bilateral lower extremity deep venous thrombosis.          < from: TTE with Doppler (w/Cont) (10.30.18 @ 14:30) >  regurgitation seen. Peak left ventricular outflowtract  gradient equals 4 mm Hg.  Aortic Root: 3.6 cm.  Left Atrium: Left atrium not well visualized, probably  normal.  Left Ventricle: Endocardial visualization enhanced with  intravenous injection of Ultrasonic Enhancing Agent  (Definity).  Normal left ventricular systolic function.  Septal flattening consistent with right ventricular  overload. Normal left ventricular internal dimensions and  wall thicknesses.  Right Heart: Right atrium not well visualized, probably  normal. Right ventricular enlargement with decreased right  ventricular systolic function. Right ventricular systolic  dysfunction with preserved apical wall motion is seen  consistent with Mcfarland's sign. Tricuspid valve not well  visualized, probably normal. Mild-moderate tricuspid  regurgitation. Pulmonic valve not well visualized, probably  normal. Minimal pulmonic regurgitation.  Pericardium/Pleura: Normal pericardium with no pericardial  effusion.  Hemodynamic: Estimated right atrial pressure is 8 mm Hg.  Estimatedright ventricular systolic pressure equals 44 mm  Hg, assuming right atrial pressure equals 8 mm Hg,  consistent with mild pulmonary hypertension.  ------------------------------------------------------------------------  Conclusions:  1. Normal mitral valve. Minimal mitral regurgitation.  2. Normal trileaflet aortic valve. No aortic valve  regurgitation seen.  3. Endocardial visualization enhanced with intravenous  injection of Ultrasonic Enhancing Agent (Definity).  Normal  left ventricular systolic function. Septal flattening  consistent with right ventricular overload.  4. Right ventricular enlargement with decreased right  ventricular systolic function. Right ventricular systolic  dysfunction with preserved apical wall motion is seen  consistent with Mcfarland's sign.  5. Estimated pulmonary artery systolic pressure equals 44  mm Hg, assuming right atrial pressure equals 8 mm Hg,  consistent with mild pulmonary pressures.  *** No previous Echo exam.  ------------------------------------------------------------------------  Confirmed on  10/30/2018 - 16:27:50 by Douglas Perry M.D.  ------------------------------------------------------------------------    < end of copied text >                BRITTANY MATSON M.D., ATTENDING RADIOLOGIST  This document has been electronically signed. Oct 30 2018  5:38PM             < end of copied text >        PTT - ( 02 Nov 2018 06:14 )  PTT:31.6 sec          RECENT CULTURES:        RESPIRATORY CULTURES:          Studies  Chest X-RAY  CT SCAN Chest   Venous Dopplers: LE:   CT Abdomen  Others

## 2018-11-03 LAB — PROT S FREE PPP-ACNC: 87 % NORMAL — SIGNIFICANT CHANGE UP (ref 60–140)

## 2018-11-06 LAB
DNA PLOIDY SPEC FC-IMP: SIGNIFICANT CHANGE UP
PTR INTERPRETATION: SIGNIFICANT CHANGE UP

## 2018-11-08 NOTE — HISTORY OF PRESENT ILLNESS
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 CT angiogram: enlargement of the pulmonary artery, right heart strain, multiple pulmonary emboli, abdomen and bones unremarkable. Dopplers of legs 10/30/2018 negative for clot. Chest film WNL. TTE with Doppler 10/30/2018: EF 70%, right ventricular enlargement, no valve vegetations.  Protein C  111%, Protein S 61%, anticardiolipin AB +, CHINO 1:80 speckled. MCV 78.\par NOTE IN THE PRESENCE OF APIXABAN, NUMEROUS COAGULATION TESTS BECOME UNRELIABLE. THE TESTS DRAWN MAY NEED TO BE REPEATED OFF ANTICOAGULATION. THE LEAST AFFECTED TESTS ARE DRAWN 11/2018. Manda EJ, Deborah G: Interference of direct oral anticoagulants in hemostasis assays: high potential for diagnostic false positives and false negatives. Blood Transfusion 15: 491-4, 2017.  \par \par \par \par  [FreeTextEntry1] : Eliquis 5 mg BID start 10/31/2018

## 2018-11-09 ENCOUNTER — OUTPATIENT (OUTPATIENT)
Dept: OUTPATIENT SERVICES | Facility: HOSPITAL | Age: 38
LOS: 1 days | Discharge: HOME | End: 2018-11-09

## 2018-11-09 DIAGNOSIS — Z98.84 BARIATRIC SURGERY STATUS: Chronic | ICD-10-CM

## 2018-11-09 DIAGNOSIS — D68.2 HEREDITARY DEFICIENCY OF OTHER CLOTTING FACTORS: ICD-10-CM

## 2018-11-12 ENCOUNTER — APPOINTMENT (OUTPATIENT)
Dept: HEMATOLOGY ONCOLOGY | Facility: CLINIC | Age: 38
End: 2018-11-12

## 2018-11-13 ENCOUNTER — NON-APPOINTMENT (OUTPATIENT)
Age: 38
End: 2018-11-13

## 2018-11-13 ENCOUNTER — APPOINTMENT (OUTPATIENT)
Dept: INTERNAL MEDICINE | Facility: CLINIC | Age: 38
End: 2018-11-13
Payer: COMMERCIAL

## 2018-11-13 VITALS
BODY MASS INDEX: 51.91 KG/M2 | WEIGHT: 293 LBS | DIASTOLIC BLOOD PRESSURE: 81 MMHG | SYSTOLIC BLOOD PRESSURE: 132 MMHG | HEIGHT: 63 IN | HEART RATE: 84 BPM

## 2018-11-13 PROCEDURE — 99385 PREV VISIT NEW AGE 18-39: CPT | Mod: 25

## 2018-11-13 PROCEDURE — 36415 COLL VENOUS BLD VENIPUNCTURE: CPT

## 2018-11-13 PROCEDURE — 81003 URINALYSIS AUTO W/O SCOPE: CPT | Mod: QW

## 2018-11-15 ENCOUNTER — MEDICATION RENEWAL (OUTPATIENT)
Age: 38
End: 2018-11-15

## 2018-11-15 NOTE — HISTORY OF PRESENT ILLNESS
[de-identified] : Patient presents for CPE and establishment of a new primary.  Patient was recently at ER with SOB thought to be asthma denatured to 80% was found to have multiple bilateral pulmonary emboli. Lupus Antiphospholipid antibody positive.  HR was in the 130's.  \par On eliquis 5mg BID after loading with 10mg BID. Felling better now, still having episodes of chest pain intermittently.  \par \par Currently Engaged she does plan on having children eventually.  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  .  \par \par Got flu vaccine.  Vaccines UTD getting records.  Does feel a bit of squeezing.  \par

## 2018-11-15 NOTE — ASSESSMENT
[FreeTextEntry1] : 38 year old woman with history of asthma presents for CPE.  More recent diagnosis of multiple bilateral pulmonary emboli, unprovoked PE, possible underlying antiphospholipid syndrome, no known history of lupus though strong family history of autoimmune disorder.  \par \par PE in setting of possible antiphospholipid antibody.  She will be following up with Hematology. Remains on Eliquis.  Would defer to them over their questions on duration of therapy, however I did discuss that she would probably be on anticoagulation for quite some time. SHould be ok to go on a car ride that he has planed as long as she stays mobile ever 3-4 hours during car stops.  \par \par History of morbid obesity, had lost some weight, recommended she continue trying to the best of her ability.  This would help her in many ways including decrease risk fo  cardiovascular disease and strokes especially given her history of the antiphospholipid antibody.  We discussed methods for weight loss.  Medically we could attempt to help her with this with an  appetite suppressant, recommended starting with Contrave BID.  DIscussed risks and side effects.  \par \par She does have a history of reflux. Recommended she take omeprazole for GI prophylaxis while on eliquis.  \par \par Check CBC, CMP, HgA1c, Lipid profile, TSH, Vitamin D, UA

## 2018-11-15 NOTE — PHYSICAL EXAM

## 2018-11-19 ENCOUNTER — MESSAGE (OUTPATIENT)
Age: 38
End: 2018-11-19

## 2018-11-19 LAB
25(OH)D3 SERPL-MCNC: 18.2 NG/ML
BASOPHILS # BLD AUTO: 0.04 K/UL
BASOPHILS NFR BLD AUTO: 0.4 %
BILIRUB UR QL STRIP: NORMAL
CHOLEST SERPL-MCNC: 173 MG/DL
CHOLEST/HDLC SERPL: 3 RATIO
CLARITY UR: CLEAR
COLLECTION METHOD: NORMAL
EOSINOPHIL # BLD AUTO: 0.24 K/UL
EOSINOPHIL NFR BLD AUTO: 2.6 %
GLUCOSE UR-MCNC: NORMAL
HBA1C MFR BLD HPLC: 5.5 %
HCG UR QL: 0.2 EU/DL
HCT VFR BLD CALC: 36.6 %
HDLC SERPL-MCNC: 58 MG/DL
HGB BLD-MCNC: 11.2 G/DL
HGB UR QL STRIP.AUTO: NORMAL
IMM GRANULOCYTES NFR BLD AUTO: 0.1 %
KETONES UR-MCNC: NORMAL
LDLC SERPL CALC-MCNC: 102 MG/DL
LEUKOCYTE ESTERASE UR QL STRIP: NORMAL
LYMPHOCYTES # BLD AUTO: 2.53 K/UL
LYMPHOCYTES NFR BLD AUTO: 27.4 %
MAN DIFF?: NORMAL
MCHC RBC-ENTMCNC: 23.9 PG
MCHC RBC-ENTMCNC: 30.6 GM/DL
MCV RBC AUTO: 78 FL
MONOCYTES # BLD AUTO: 0.45 K/UL
MONOCYTES NFR BLD AUTO: 4.9 %
NEUTROPHILS # BLD AUTO: 5.96 K/UL
NEUTROPHILS NFR BLD AUTO: 64.6 %
NITRITE UR QL STRIP: NORMAL
PH UR STRIP: 5.5
PLATELET # BLD AUTO: NORMAL K/UL
PROT UR STRIP-MCNC: NORMAL
RBC # BLD: 4.69 M/UL
RBC # FLD: 16.4 %
SAVE SPECIMEN: NORMAL
SP GR UR STRIP: 1.02
TRIGL SERPL-MCNC: 64 MG/DL
TSH SERPL-ACNC: 1.07 UIU/ML
WBC # FLD AUTO: 9.23 K/UL

## 2018-12-03 ENCOUNTER — MESSAGE (OUTPATIENT)
Age: 38
End: 2018-12-03

## 2018-12-10 LAB
B2 GLYCOPROT1 IGA SERPL IA-ACNC: <5 SAU
C3 SERPL-MCNC: 185 MG/DL
FERRITIN SERPL-MCNC: 17 NG/ML
FIBRINOGEN PPP COAG.DERIVED-MCNC: 824 MG/DL
HCYS SERPL-MCNC: 8.9 UMOL/L
IRON SATN MFR SERPL: 6 %
IRON SERPL-MCNC: 25 UG/DL
PLASM INHIB ACT/NOR PPP CHRO: 130 %
TIBC SERPL-MCNC: 446 UG/DL
UIBC SERPL-MCNC: 421 UG/DL

## 2018-12-14 ENCOUNTER — EMERGENCY (EMERGENCY)
Facility: HOSPITAL | Age: 38
LOS: 1 days | End: 2018-12-14
Attending: EMERGENCY MEDICINE
Payer: COMMERCIAL

## 2018-12-14 VITALS
HEIGHT: 62 IN | TEMPERATURE: 99 F | RESPIRATION RATE: 20 BRPM | DIASTOLIC BLOOD PRESSURE: 75 MMHG | WEIGHT: 259.93 LBS | SYSTOLIC BLOOD PRESSURE: 110 MMHG | OXYGEN SATURATION: 99 % | HEART RATE: 77 BPM

## 2018-12-14 DIAGNOSIS — Z98.84 BARIATRIC SURGERY STATUS: Chronic | ICD-10-CM

## 2018-12-14 LAB
ALBUMIN SERPL ELPH-MCNC: 4.1 G/DL — SIGNIFICANT CHANGE UP (ref 3.3–5)
ALP SERPL-CCNC: 97 U/L — SIGNIFICANT CHANGE UP (ref 40–120)
ALT FLD-CCNC: 15 U/L — SIGNIFICANT CHANGE UP (ref 10–45)
ANION GAP SERPL CALC-SCNC: 13 MMOL/L — SIGNIFICANT CHANGE UP (ref 5–17)
APTT BLD: 35.5 SEC — SIGNIFICANT CHANGE UP (ref 27.5–36.3)
AST SERPL-CCNC: 13 U/L — SIGNIFICANT CHANGE UP (ref 10–40)
BASOPHILS # BLD AUTO: 0 K/UL — SIGNIFICANT CHANGE UP (ref 0–0.2)
BASOPHILS NFR BLD AUTO: 0 % — SIGNIFICANT CHANGE UP (ref 0–2)
BILIRUB SERPL-MCNC: 0.1 MG/DL — LOW (ref 0.2–1.2)
BUN SERPL-MCNC: 15 MG/DL — SIGNIFICANT CHANGE UP (ref 7–23)
CALCIUM SERPL-MCNC: 8.9 MG/DL — SIGNIFICANT CHANGE UP (ref 8.4–10.5)
CHLORIDE SERPL-SCNC: 102 MMOL/L — SIGNIFICANT CHANGE UP (ref 96–108)
CO2 SERPL-SCNC: 21 MMOL/L — LOW (ref 22–31)
CREAT SERPL-MCNC: 0.65 MG/DL — SIGNIFICANT CHANGE UP (ref 0.5–1.3)
EOSINOPHIL # BLD AUTO: 0 K/UL — SIGNIFICANT CHANGE UP (ref 0–0.5)
EOSINOPHIL NFR BLD AUTO: 0.2 % — SIGNIFICANT CHANGE UP (ref 0–6)
GLUCOSE SERPL-MCNC: 180 MG/DL — HIGH (ref 70–99)
HCT VFR BLD CALC: 36.1 % — SIGNIFICANT CHANGE UP (ref 34.5–45)
HGB BLD-MCNC: 11.3 G/DL — LOW (ref 11.5–15.5)
INR BLD: 1.18 RATIO — HIGH (ref 0.88–1.16)
LYMPHOCYTES # BLD AUTO: 0.6 K/UL — LOW (ref 1–3.3)
LYMPHOCYTES # BLD AUTO: 6.3 % — LOW (ref 13–44)
MCHC RBC-ENTMCNC: 24.5 PG — LOW (ref 27–34)
MCHC RBC-ENTMCNC: 31.4 GM/DL — LOW (ref 32–36)
MCV RBC AUTO: 78 FL — LOW (ref 80–100)
MONOCYTES # BLD AUTO: 0.1 K/UL — SIGNIFICANT CHANGE UP (ref 0–0.9)
MONOCYTES NFR BLD AUTO: 1.2 % — LOW (ref 2–14)
NEUTROPHILS # BLD AUTO: 8.2 K/UL — HIGH (ref 1.8–7.4)
NEUTROPHILS NFR BLD AUTO: 92.3 % — HIGH (ref 43–77)
PLATELET # BLD AUTO: 396 K/UL — SIGNIFICANT CHANGE UP (ref 150–400)
POTASSIUM SERPL-MCNC: 4.3 MMOL/L — SIGNIFICANT CHANGE UP (ref 3.5–5.3)
POTASSIUM SERPL-SCNC: 4.3 MMOL/L — SIGNIFICANT CHANGE UP (ref 3.5–5.3)
PROT SERPL-MCNC: 7.4 G/DL — SIGNIFICANT CHANGE UP (ref 6–8.3)
PROTHROM AB SERPL-ACNC: 13.6 SEC — HIGH (ref 10–12.9)
RBC # BLD: 4.62 M/UL — SIGNIFICANT CHANGE UP (ref 3.8–5.2)
RBC # FLD: 14.4 % — SIGNIFICANT CHANGE UP (ref 10.3–14.5)
SODIUM SERPL-SCNC: 136 MMOL/L — SIGNIFICANT CHANGE UP (ref 135–145)
TROPONIN T, HIGH SENSITIVITY RESULT: <6 NG/L — SIGNIFICANT CHANGE UP (ref 0–51)
WBC # BLD: 8.9 K/UL — SIGNIFICANT CHANGE UP (ref 3.8–10.5)
WBC # FLD AUTO: 8.9 K/UL — SIGNIFICANT CHANGE UP (ref 3.8–10.5)

## 2018-12-14 PROCEDURE — 85610 PROTHROMBIN TIME: CPT

## 2018-12-14 PROCEDURE — G0378: CPT

## 2018-12-14 PROCEDURE — 94640 AIRWAY INHALATION TREATMENT: CPT

## 2018-12-14 PROCEDURE — 80053 COMPREHEN METABOLIC PANEL: CPT

## 2018-12-14 PROCEDURE — 99220: CPT

## 2018-12-14 PROCEDURE — 71045 X-RAY EXAM CHEST 1 VIEW: CPT

## 2018-12-14 PROCEDURE — 85730 THROMBOPLASTIN TIME PARTIAL: CPT

## 2018-12-14 PROCEDURE — 84484 ASSAY OF TROPONIN QUANT: CPT

## 2018-12-14 PROCEDURE — 71045 X-RAY EXAM CHEST 1 VIEW: CPT | Mod: 26

## 2018-12-14 PROCEDURE — 99284 EMERGENCY DEPT VISIT MOD MDM: CPT | Mod: 25

## 2018-12-14 PROCEDURE — 71275 CT ANGIOGRAPHY CHEST: CPT | Mod: 26

## 2018-12-14 PROCEDURE — 85027 COMPLETE CBC AUTOMATED: CPT

## 2018-12-14 PROCEDURE — 71275 CT ANGIOGRAPHY CHEST: CPT

## 2018-12-14 RX ORDER — ALBUTEROL 90 UG/1
2.5 AEROSOL, METERED ORAL ONCE
Qty: 0 | Refills: 0 | Status: COMPLETED | OUTPATIENT
Start: 2018-12-14 | End: 2018-12-14

## 2018-12-14 RX ORDER — IPRATROPIUM/ALBUTEROL SULFATE 18-103MCG
3 AEROSOL WITH ADAPTER (GRAM) INHALATION EVERY 6 HOURS
Qty: 0 | Refills: 0 | Status: DISCONTINUED | OUTPATIENT
Start: 2018-12-14 | End: 2018-12-18

## 2018-12-14 RX ADMIN — Medication 3 MILLILITER(S): at 21:27

## 2018-12-14 RX ADMIN — ALBUTEROL 2.5 MILLIGRAM(S): 90 AEROSOL, METERED ORAL at 15:44

## 2018-12-14 NOTE — ED CDU PROVIDER INITIAL DAY NOTE - ATTENDING CONTRIBUTION TO CARE
ATTENDING, Nigel RIZO: I have personally performed a face to face diagnostic evaluation on this patient.  I have reviewed the ACP note and agree with the history, exam, and plan of care, except as noted here. Progress notes and further evaluation to be reviewed by observation and discharging attending.

## 2018-12-14 NOTE — ED PROVIDER NOTE - OBJECTIVE STATEMENT
38F PMH mild persistent asthma, PE on eliquis who p/w chest tightness and SOB following tobacco smoke exposure; pt was on EMS call at work when she started developing wheezing and SOB. Pt received combivent x3, solumedrol 125mg and magnesium while in ambulance. Pt last admitted for asthma when she was 12 y old, no admissions as an adult; reprots recent rhinorrhea but no fevers or chills.

## 2018-12-14 NOTE — ED CDU PROVIDER INITIAL DAY NOTE - PROGRESS NOTE DETAILS
CDU NOTE JIMMY SPEARS: Pt resting comfortably, still has some chest tightness. denies any SOB or wheezing. NAD, VSS. Lungs: CTA b/l, no wheezing. will continue nebs and monitoring. I was asked to come see pt by JIMMY Peterson as patient inquiring about leaving. On re-eval, pt states still experiencing some chest "tightness," is also having mild tachypnea (which she states she has had since PE dx). pt states she is currently being worked up for anti-cardiolipin or anti-phospholipid syndrome. given this possible dx, recent extensive PEs with heart strain, it is possible (though less likely) that pt could be failing eliquis treatment. I discussed risks/benefits of obtaining CTA with pt and she wishes to have this test performed. plan to obtain labs, cta, and will re-evaluate. pt informed that this will extend her stay by several hours but she is still agreeable. - Sherman Shaffer MD CTA limited study but without findings of PE. trop neg. no signs of heart strain on cta. labs otherwise unremarkable. pt feels sig improved. pt wishes to be d/c'd and is unwilling to stay until morning. with min tightness at this time. lungs ctab. pt ambulated, O2 sat >94% on RA and without sob. pt to f/u with her physician. strict return precautions discussed in detail with patient, including but not limited to sob, chest pain, fever/chills, or anything else she finds concerning, if present she will return immediately. an opportunity to ask questions was provided and all answered. stable for d/c. - Sherman Shaffer MD

## 2018-12-14 NOTE — ED ADULT NURSE NOTE - CAS TRG GEN SKIN COLOR
Message  Return to work or school:      He is able to return to school on 1/16/2017     Please excuse Yaneth Bering from school on 1/11/2017 thru 1/13/2017  Thank you        Signatures   Electronically signed by : Lidya Gold, ; Jan 13 2017 11:23AM EST                       (Author)
Normal for race

## 2018-12-14 NOTE — ED ADULT NURSE REASSESSMENT NOTE - NS ED NURSE REASSESS COMMENT FT1
Received pt from GINNY Neville, received pt alert and responsive, oriented x4, denies any respiratory distress, SOB, or difficulty breathing. Pt transferred to CDU for asthma exacerbation. Pt states she currently has improvement in symptoms. Sat 99% RA on continuous o2 monitoring. Pending Duoneb treatments as ordered, pt aware. IV in place, patent and free of signs of infiltration, placed on continuos cardiac monitoring as ordered, NSR HR in the,  pt denies chest pain or palpitations, V/S stable, pt afebrile, pt denies pain at this time. Pt educated on unit and unit rules, instructed patient to notify RN of any needed assistance, Pt verbalizes understanding, Call bell placed within reach. Safety maintained. Will continue to monitor. Received pt from GINNY Neville, received pt alert and responsive, oriented x4, denies any respiratory distress, SOB, or difficulty breathing. Pt transferred to CDU for asthma exacerbation. Pt states she currently has improvement in symptoms. Sat 99% RA on continuous o2 monitoring. Pending Duoneb treatments as ordered, pt aware. IV in place, patent and free of signs of infiltration, V/S stable, pt afebrile, pt denies pain at this time. Pt educated on unit and unit rules, instructed patient to notify RN of any needed assistance, Pt verbalizes understanding, Call bell placed within reach. Safety maintained. Will continue to monitor.

## 2018-12-14 NOTE — ED ADULT NURSE NOTE - NSIMPLEMENTINTERV_GEN_ALL_ED
Implemented All Universal Safety Interventions:  [place] to call system. Call bell, personal items and telephone within reach. Instruct patient to call for assistance. Room bathroom lighting operational. Non-slip footwear when patient is off stretcher. Physically safe environment: no spills, clutter or unnecessary equipment. Stretcher in lowest position, wheels locked, appropriate side rails in place.

## 2018-12-14 NOTE — ED PROVIDER NOTE - ATTENDING CONTRIBUTION TO CARE
sob after smoke exposure, worsening then better after ems tx, 6 wks on eliquis for pe, feeling better.   4-5 word sent, exp wheeze, slow, mild diminished.  monitor, repeat neb, re-assess. do not think this is related to pe.

## 2018-12-14 NOTE — CONSULT NOTE ADULT - ASSESSMENT
38F PMH mild persistent asthma, Multilobar PE with right heart strain on eliquis who p/w chest tightness and SOB following tobacco smoke exposure.     1. SOB  likely secondary to asthma exacerbation secondary to tobacco smoke exposure   CXR revealing clear lungs  symptoms now resolved after nebs, steroids and mag  cv stable no chest pain or sob. no evidence of decompensated CHF on exam   low suspicion for PE as pt has been on AC   continue to reassess, further w/u & dispo per ED     dvt ppx

## 2018-12-14 NOTE — ED PROVIDER NOTE - PROGRESS NOTE DETAILS
Ankita PGY3: pt complaining of persistent chest tightness, will give albuterol and reassess Ankita PGY3: pt still with mild tightness, CDU evaluated will go for observation

## 2018-12-14 NOTE — ED CDU PROVIDER INITIAL DAY NOTE - OBJECTIVE STATEMENT
38F PMH mild persistent asthma, PE on eliquis who p/w chest tightness and SOB following tobacco smoke exposure; pt was on EMS call at work when she started developing wheezing and SOB. Pt received combivent x3, solumedrol 125mg and magnesium while in ambulance. Pt last admitted for asthma when she was 12 y old, no admissions as an adult; reports recent rhinorrhea but no fevers or chills.

## 2018-12-14 NOTE — CONSULT NOTE ADULT - SUBJECTIVE AND OBJECTIVE BOX
CARDIOLOGY CONSULT - Dr. Tavarez     CHIEF COMPLAINT: 38F PMH mild persistent asthma, Multilobar PE with right heart strain on eliquis who p/w chest tightness and SOB following tobacco smoke exposure; pt was on EMS call at work when she started developing wheezing and SOB. Pt received combivent x3, solumedrol 125mg and magnesium while in ambulance. Ptt. states the SOB is better now and she does not feel like she did when she presented with acute PE. No cp, fever, chills, LE edema. Pt last admitted for asthma when she was 12 y old, no admissions as an adult; Also reports some rhinorrhea.       PAST MEDICAL & SURGICAL HISTORY:  Morbid obesity  Polyp: rectal  Meniscus tear  Knee dislocation, left, sequela  Vertigo, benign positional  Asthma  Bariatric surgery status  Status post laparoscopic sleeve gastrectomy          PREVIOUS DIAGNOSTIC TESTING:    [ ] Echocardiogram: < from: TTE with Doppler (w/Cont) (10.30.18 @ 14:30) >  Conclusions:  1. Normal mitral valve. Minimal mitral regurgitation.  2. Normal trileaflet aortic valve. No aortic valve  regurgitation seen.  3. Endocardial visualization enhanced with intravenous  injection of Ultrasonic Enhancing Agent (Definity).  Normal  left ventricular systolic function. Septal flattening  consistent with right ventricular overload.  4. Right ventricular enlargement with decreased right  ventricular systolic function. Right ventricular systolic  dysfunction with preserved apical wall motion is seen  consistent with Mcfarland's sign.  5. Estimated pulmonary artery systolic pressure equals 44  mm Hg, assuming right atrial pressure equals 8 mm Hg,  consistent with mild pulmonary pressures.  *** No previous Echo exam.    < end of copied text >    [ ]  Catheterization:   [ ] Stress Test:  	    MEDICATIONS:  MEDICATIONS  (STANDING):  ALBUTerol    0.083%. 2.5 milliGRAM(s) Nebulizer once      FAMILY HISTORY:  Family history of colon cancer (Uncle)  Family history of colon cancer (Grandparent)      SOCIAL HISTORY:    [x] Non-smoker  [ ] Smoker  [ ] Alcohol    Allergies    pseudoephedrine (Other)  Reglan (Other)  sulfonamides (Hives)    Intolerances    	    REVIEW OF SYSTEMS:  CONSTITUTIONAL: No fever, weight loss, or fatigue  EYES: No eye pain, visual disturbances, or discharge  ENMT:  No difficulty hearing, tinnitus, vertigo; No sinus or throat pain  NECK: No pain or stiffness  RESPIRATORY: No cough, +wheezing, chills or hemoptysis; + Shortness of Breath  CARDIOVASCULAR: No chest pain, palpitations, passing out, dizziness, or leg swelling  GASTROINTESTINAL: No abdominal or epigastric pain. No nausea, vomiting, or hematemesis; No diarrhea or constipation. No melena or hematochezia.  GENITOURINARY: No dysuria, frequency, hematuria, or incontinence  NEUROLOGICAL: No headaches, memory loss, loss of strength, numbness, or tremors  SKIN: No itching, burning, rashes, or lesions   	    [x] All others negative	  [ ] Unable to obtain    PHYSICAL EXAM:  T(C): 37 (12-14-18 @ 13:46), Max: 37 (12-14-18 @ 13:46)  HR: 77 (12-14-18 @ 13:46) (77 - 77)  BP: 110/75 (12-14-18 @ 13:46) (110/75 - 110/75)  RR: 20 (12-14-18 @ 13:46) (20 - 20)  SpO2: 99% (12-14-18 @ 13:46) (99% - 99%)  Wt(kg): --  I&O's Summary      Appearance: Normal	  Psychiatry: A & O x 3, Mood & affect appropriate  HEENT:   Normal oral mucosa, PERRL, EOMI	  Lymphatic: No lymphadenopathy  Cardiovascular: Normal S1 S2,RRR, No JVD, No murmurs  Respiratory: Lungs clear to auscultation	  Gastrointestinal:  Soft, Non-tender, + BS	  Skin: No rashes, No ecchymoses, No cyanosis	  Neurologic: Non-focal  Extremities: Normal range of motion, No clubbing, cyanosis or edema  Vascular: Peripheral pulses palpable 2+ bilaterally    TELEMETRY: 	    ECG:  	  RADIOLOGY:   OTHER: 	< from: Xray Chest 1 View- PORTABLE-Urgent (12.14.18 @ 15:12) >    IMPRESSION:   Clear lungs.    < end of copied text >    	  LABS:	 	    CARDIAC MARKERS:                      proBNP:   Lipid Profile:   HgA1c:   TSH:

## 2018-12-14 NOTE — ED PROVIDER NOTE - MEDICAL DECISION MAKING DETAILS
38F PMH asthma and PE on eliquis p/w sob and wheezing now improved after nebs, steroids and mag; low suspicion for PE as pt is therapeutically anticoagulated without missed doses and no other risk factors for blood clots; will check CXR and reassess

## 2018-12-14 NOTE — CONSULT NOTE ADULT - ATTENDING COMMENTS
agree with the above assessment and plan by MAURICIO Ferrera.  pt well known to us from prior admission for admission for acute PE w right heart strain p/w dyspnea in setting of smoke exposure c/ww likely asthma exacerbation  pt now clinically improved  low suspicion for PE as pt has been on AC   continue to reassess, further w/u & dispo per ED

## 2018-12-14 NOTE — ED CDU PROVIDER INITIAL DAY NOTE - PHYSICAL EXAMINATION
Gen: NAD, AOx3  Head: NCAT  HEENT: PERRL, oral mucosa moist, normal conjunctiva, oropharynx clear without exudate or erythema  Lung: prolonged expriatory phase; no respiratory distress, no wheezing, rales, rhonchi  CV: normal s1/s2, rrr, no murmurs, Normal perfusion, pulses 2+ throughout  Abd: soft, NTND, no CVA tenderness  MSK: No edema, no visible deformities, full range of motion in all 4 extremities  Neuro: CN II-XII grossly intact, No focal neurologic deficits  Skin: No rash   Psych: normal affect

## 2018-12-14 NOTE — ED ADULT NURSE NOTE - OBJECTIVE STATEMENT
38 year old female presents to ED via EMS complaining of difficulty breathing. History of Asthma (intubated when she was 12), PE on Eliquis. 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 Partner noted her worsening shortness of breath and difficulty breathing - states she had + ronprecious, gave 125mg solumedrol, 2grams magnesium running, duoneb x 3 going. 38 year old female presents to ED via EMS complaining of difficulty breathing. History of Asthma (intubated when she was 12), PE on Eliquis. 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 Partner noted her worsening shortness of breath and difficulty breathing - states she had + ronchi, gave 125mg solumedrol, 2grams magnesium running, duoneb x 3 going. Patient now endorsing mild chest tightness however states everything is improved after the medication and treatments. Recently has had mild nasal congestion. Denies headache, abdominal pain, nausea, vomiting, fevers, chills. Patient undressed and placed into gown, call bell in hand and side rails up with bed in lowest position for safety. blanket provided. Comfort and safety provided.

## 2018-12-15 VITALS
OXYGEN SATURATION: 98 % | DIASTOLIC BLOOD PRESSURE: 77 MMHG | RESPIRATION RATE: 18 BRPM | HEART RATE: 98 BPM | SYSTOLIC BLOOD PRESSURE: 121 MMHG | TEMPERATURE: 98 F

## 2018-12-15 PROCEDURE — 99217: CPT

## 2018-12-15 NOTE — ED CDU PROVIDER SUBSEQUENT DAY NOTE - ATTENDING CONTRIBUTION TO CARE
38F pmh mild persistent asthma, recent PE on eliquis (?anti-cardiolipin or anti-phospholipid syndrome), pt has been compliant with a/c, biba to ED with chest tightness, sob, following exposure to cigarette smoke. en route to ed pt received solumedrol, magnesium, duoneb x 3. pt was complaining of recent rhinorrhea, denies f/c, n/v/d, abd pain, or any other complaints. Pt denies any LE pain or swelling.    PE at time of discharge: Well appearing young female in NAD, NCAT, MMM, Trachea midline, Normal conjunctiva, lungs CTAB, S1/S2 RRR, Normal perfusion, 2+ radial pulses bilat, Abdomen Soft, NTND, No rebound/guarding, No LE edema, No calf ttp, No deformity of extremities, No rashes,  No focal motor or sensory deficits.    At time of re-evaluation s/p labs, imaging, and prior to discharge pt well appearing, lungs ctab bilat, no increased WOB, pt ambulating without sob, and O2 sat 98 on RA. Pt was sent to CDU for further management of presumed asthma exacerbation. Pt did complain of cp, sob, with recent dx of PE and currently on xarelto. While in CDU, an EKG was obtain which was unchanged from previous and showed no acute ischemic changes, a trop was obtained which was negative. After discussing risks/benefits, a cta chest was obtained which was sub-optimal but showed no central PE. Presentation most c/w asthma exacerbation, with very low suspicion of recurrent PE or cardiac pathology. Pt felt significantly improved and wished to be d/c'd to f/u with her outpatient providers including PMD, hematologist. Pt clinically improved from presentation to hospital. Importance of close f/u and strict return precautions d/w patient. An opportunity to ask questions was provided and all answered. Stable to be d/c'd for outpatient f/u. - Sherman Shaffer MD

## 2018-12-15 NOTE — ED CDU PROVIDER SUBSEQUENT DAY NOTE - HISTORY
No interval changes since initial CDU provider note. Pt feels well, states chest tightness is resolving. denies SOB, wheezing, CP. NAD. VSS. trop negative, other labs unremarkable, CTA chest performed and results are pending. will continue to monitor. Porfirio Peterson

## 2018-12-15 NOTE — ED ADULT NURSE REASSESSMENT NOTE - NS ED NURSE REASSESS COMMENT FT1
Pt d/c home per MD Shaffer & JIMMY Dupont, VSS no complaints, IV lock removed, ambulated out of unit with spouse and with own belongings. Left hospital via private car. D/C documentation provided to pt by PA.

## 2018-12-15 NOTE — ED CDU PROVIDER SUBSEQUENT DAY NOTE - PROGRESS NOTE DETAILS
CDU NOTE JIMMY SPEARS: CTA chest limited study but notes no main R or L pulm embolism, no heart strain. CTA appears improved from prior CTA chest on 10/30/18. NAD, VSS. case and plan discussed with Dr. Shaffer; pt stable for discharge with nebs at home prn and f/u outpt with her heme.

## 2018-12-15 NOTE — ED CDU PROVIDER DISPOSITION NOTE - ATTENDING CONTRIBUTION TO CARE
38F pmh mild persistent asthma, recent PE on eliquis (?anti-cardiolipin or anti-phospholipid syndrome), pt has been compliant with a/c, biba to ED with chest tightness, sob, following exposure to cigarette smoke. en route to ed pt received solumedrol, magnesium, duoneb x 3. pt was complaining of recent rhinorrhea, denies f/c, n/v/d, abd pain, or any other complaints. Pt denies any LE pain or swelling.    PE at time of discharge: Well appearing young female in NAD, NCAT, MMM, Trachea midline, Normal conjunctiva, lungs CTAB, S1/S2 RRR, Normal perfusion, 2+ radial pulses bilat, Abdomen Soft, NTND, No rebound/guarding, No LE edema, No calf ttp, No deformity of extremities, No rashes,  No focal motor or sensory deficits.    In ED, pt had cxr performed which revealed no acute pathology, was evaluated by cardiology who felt sx most c/w asthma exacerbation, and pt was sent to CDU for further management of presumed asthma exacerbation. Pt did complain of cp, sob, with recent dx of PE and currently on xarelto, so an EKG was obtained which was unchanged from previous and showed no acute ischemic changes, a trop was obtained which was negative. After discussing risks/benefits, a cta chest was obtained which was sub-optimal but showed no central PE. Presentation remained most c/w asthma exacerbation, with very low suspicion of recurrent PE or cardiac pathology. At time of re-evaluation s/p labs, imaging, and prior to discharge pt well appearing, lungs ctab bilat, no increased WOB, pt ambulating without sob, and O2 sat 98 on RA. Pt felt significantly improved and wished to be d/c'd to f/u with her outpatient providers including PMD, hematologist. Pt clinically improved from presentation to hospital. Importance of close f/u and strict return precautions d/w patient. An opportunity to ask questions was provided and all answered. Stable to be d/c'd for outpatient f/u. - Sherman Shaffer MD

## 2018-12-15 NOTE — ED CDU PROVIDER DISPOSITION NOTE - NSFOLLOWUPINSTRUCTIONS_ED_ALL_ED_FT
1. Continue your Elliquis along with Prednisone 50mg x 4 days AND duonebs every 4-6 hrs as needed for SOB/wheezing.  2. Drink PLENTY of fluids to stay hydrated.  3. You will need to follow-up with your PMD and hemeatologist in 2-3 days. A copy of your results were given to you to bring to your appointment.  4. Return to ER for worsening shortness of breath, fevers, or any other concerns.

## 2018-12-15 NOTE — ED CDU PROVIDER DISPOSITION NOTE - CLINICAL COURSE
39y/o F PMH mild persistent asthma, PE on eliquis who p/w chest tightness and SOB following tobacco smoke exposure; pt was on EMS call at work when she started developing wheezing and SOB. Pt received combivent x3, solumedrol 125mg and magnesium while in ambulance. Pt last admitted for asthma when she was 12 y old, no admissions as an adult; reports recent rhinorrhea but no fevers or chills.  In ED, CXR negative and cardio c/s'd. per cardio unlikely symptoms related to PEs, more likely asthma exacerbation. pt sent to CDU for nebs and monitoring.  In CDU, trop negative (decreased from prior ED visit), other labs unremarkable, CTA chest showed no main R or L pulm ebolism, no heart strain (improved from prior). pts chest tightness decreased. no SOB, wheezing, or CP. no desat with walking. pt to f/u outpt.

## 2019-03-07 ENCOUNTER — EMERGENCY (EMERGENCY)
Facility: HOSPITAL | Age: 39
LOS: 1 days | Discharge: HOME | End: 2019-03-07
Attending: EMERGENCY MEDICINE
Payer: COMMERCIAL

## 2019-03-07 VITALS
HEIGHT: 60 IN | OXYGEN SATURATION: 100 % | RESPIRATION RATE: 22 BRPM | SYSTOLIC BLOOD PRESSURE: 125 MMHG | DIASTOLIC BLOOD PRESSURE: 83 MMHG | HEART RATE: 83 BPM | WEIGHT: 259.93 LBS

## 2019-03-07 VITALS
SYSTOLIC BLOOD PRESSURE: 128 MMHG | OXYGEN SATURATION: 99 % | HEART RATE: 67 BPM | DIASTOLIC BLOOD PRESSURE: 99 MMHG | RESPIRATION RATE: 18 BRPM | TEMPERATURE: 98 F

## 2019-03-07 DIAGNOSIS — Z98.84 BARIATRIC SURGERY STATUS: Chronic | ICD-10-CM

## 2019-03-07 LAB
ALBUMIN SERPL ELPH-MCNC: 4 G/DL — SIGNIFICANT CHANGE UP (ref 3.3–5)
ALP SERPL-CCNC: 90 U/L — SIGNIFICANT CHANGE UP (ref 40–120)
ALT FLD-CCNC: 10 U/L — SIGNIFICANT CHANGE UP (ref 10–45)
ANION GAP SERPL CALC-SCNC: 10 MMOL/L — SIGNIFICANT CHANGE UP (ref 5–17)
AST SERPL-CCNC: 14 U/L — SIGNIFICANT CHANGE UP (ref 10–40)
BASOPHILS # BLD AUTO: 0.1 K/UL — SIGNIFICANT CHANGE UP (ref 0–0.2)
BASOPHILS NFR BLD AUTO: 0.8 % — SIGNIFICANT CHANGE UP (ref 0–2)
BILIRUB SERPL-MCNC: 0.1 MG/DL — LOW (ref 0.2–1.2)
BUN SERPL-MCNC: 18 MG/DL — SIGNIFICANT CHANGE UP (ref 7–23)
CALCIUM SERPL-MCNC: 9.3 MG/DL — SIGNIFICANT CHANGE UP (ref 8.4–10.5)
CHLORIDE SERPL-SCNC: 103 MMOL/L — SIGNIFICANT CHANGE UP (ref 96–108)
CO2 SERPL-SCNC: 23 MMOL/L — SIGNIFICANT CHANGE UP (ref 22–31)
CREAT SERPL-MCNC: 0.62 MG/DL — SIGNIFICANT CHANGE UP (ref 0.5–1.3)
EOSINOPHIL # BLD AUTO: 0.1 K/UL — SIGNIFICANT CHANGE UP (ref 0–0.5)
EOSINOPHIL NFR BLD AUTO: 1.4 % — SIGNIFICANT CHANGE UP (ref 0–6)
GAS PNL BLDV: SIGNIFICANT CHANGE UP
GLUCOSE SERPL-MCNC: 118 MG/DL — HIGH (ref 70–99)
HCT VFR BLD CALC: 33.2 % — LOW (ref 34.5–45)
HGB BLD-MCNC: 11 G/DL — LOW (ref 11.5–15.5)
LYMPHOCYTES # BLD AUTO: 2 K/UL — SIGNIFICANT CHANGE UP (ref 1–3.3)
LYMPHOCYTES # BLD AUTO: 22.7 % — SIGNIFICANT CHANGE UP (ref 13–44)
MCHC RBC-ENTMCNC: 26 PG — LOW (ref 27–34)
MCHC RBC-ENTMCNC: 33.1 GM/DL — SIGNIFICANT CHANGE UP (ref 32–36)
MCV RBC AUTO: 78.6 FL — LOW (ref 80–100)
MONOCYTES # BLD AUTO: 0.5 K/UL — SIGNIFICANT CHANGE UP (ref 0–0.9)
MONOCYTES NFR BLD AUTO: 5.9 % — SIGNIFICANT CHANGE UP (ref 2–14)
NEUTROPHILS # BLD AUTO: 6.2 K/UL — SIGNIFICANT CHANGE UP (ref 1.8–7.4)
NEUTROPHILS NFR BLD AUTO: 69.3 % — SIGNIFICANT CHANGE UP (ref 43–77)
NT-PROBNP SERPL-SCNC: 170 PG/ML — SIGNIFICANT CHANGE UP (ref 0–300)
PLATELET # BLD AUTO: 430 K/UL — HIGH (ref 150–400)
POTASSIUM SERPL-MCNC: 4.6 MMOL/L — SIGNIFICANT CHANGE UP (ref 3.5–5.3)
POTASSIUM SERPL-SCNC: 4.6 MMOL/L — SIGNIFICANT CHANGE UP (ref 3.5–5.3)
PROT SERPL-MCNC: 7.2 G/DL — SIGNIFICANT CHANGE UP (ref 6–8.3)
RBC # BLD: 4.23 M/UL — SIGNIFICANT CHANGE UP (ref 3.8–5.2)
RBC # FLD: 14 % — SIGNIFICANT CHANGE UP (ref 10.3–14.5)
SODIUM SERPL-SCNC: 136 MMOL/L — SIGNIFICANT CHANGE UP (ref 135–145)
TROPONIN T, HIGH SENSITIVITY RESULT: <6 NG/L — SIGNIFICANT CHANGE UP (ref 0–51)
TROPONIN T, HIGH SENSITIVITY RESULT: <6 NG/L — SIGNIFICANT CHANGE UP (ref 0–51)
WBC # BLD: 9 K/UL — SIGNIFICANT CHANGE UP (ref 3.8–10.5)
WBC # FLD AUTO: 9 K/UL — SIGNIFICANT CHANGE UP (ref 3.8–10.5)

## 2019-03-07 PROCEDURE — 71046 X-RAY EXAM CHEST 2 VIEWS: CPT

## 2019-03-07 PROCEDURE — 93005 ELECTROCARDIOGRAM TRACING: CPT

## 2019-03-07 PROCEDURE — 84295 ASSAY OF SERUM SODIUM: CPT

## 2019-03-07 PROCEDURE — 93010 ELECTROCARDIOGRAM REPORT: CPT

## 2019-03-07 PROCEDURE — 82803 BLOOD GASES ANY COMBINATION: CPT

## 2019-03-07 PROCEDURE — 93308 TTE F-UP OR LMTD: CPT

## 2019-03-07 PROCEDURE — 83880 ASSAY OF NATRIURETIC PEPTIDE: CPT

## 2019-03-07 PROCEDURE — 80053 COMPREHEN METABOLIC PANEL: CPT

## 2019-03-07 PROCEDURE — 82435 ASSAY OF BLOOD CHLORIDE: CPT

## 2019-03-07 PROCEDURE — 84132 ASSAY OF SERUM POTASSIUM: CPT

## 2019-03-07 PROCEDURE — 82330 ASSAY OF CALCIUM: CPT

## 2019-03-07 PROCEDURE — 85027 COMPLETE CBC AUTOMATED: CPT

## 2019-03-07 PROCEDURE — 99236 HOSP IP/OBS SAME DATE HI 85: CPT

## 2019-03-07 PROCEDURE — G0378: CPT

## 2019-03-07 PROCEDURE — 99284 EMERGENCY DEPT VISIT MOD MDM: CPT | Mod: 25

## 2019-03-07 PROCEDURE — 82947 ASSAY GLUCOSE BLOOD QUANT: CPT

## 2019-03-07 PROCEDURE — 71046 X-RAY EXAM CHEST 2 VIEWS: CPT | Mod: 26

## 2019-03-07 PROCEDURE — 84484 ASSAY OF TROPONIN QUANT: CPT

## 2019-03-07 PROCEDURE — 85014 HEMATOCRIT: CPT

## 2019-03-07 PROCEDURE — 93308 TTE F-UP OR LMTD: CPT | Mod: 26

## 2019-03-07 PROCEDURE — 83605 ASSAY OF LACTIC ACID: CPT

## 2019-03-07 RX ORDER — ALPRAZOLAM 0.25 MG
0.25 TABLET ORAL ONCE
Qty: 0 | Refills: 0 | Status: DISCONTINUED | OUTPATIENT
Start: 2019-03-07 | End: 2019-03-07

## 2019-03-07 RX ADMIN — Medication 0.25 MILLIGRAM(S): at 17:36

## 2019-03-07 NOTE — ED PROVIDER NOTE - OBJECTIVE STATEMENT
37 y/o F with PMHx of mild intermittent asthma (intubated x3 as 14mo, 9 yo and 11 yo; ICU @11 yo; well controlled after childhood), BPPV, morbid obesity s/p lap band sx, PE dx'ed in Nov likely 2/2 antiphospholipid syndrome on Eliquis BIBEMS c/o SOB since this AM. 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 Pt notes she went on a trip to [place] last week (7 hour drive, states she took multiple breaks to walk while driving). Pt arrived back last night and was non-compliant with her Eliquis on vacation, forgot to take several doses, last dose this AM. Pt states she felt some chest tightness during her episode of SOB but denies CP. +recent URI last week. Pt denies f/c, HA, back pain, leg pain/swelling, abd pain, n/v, cough, hemoptysis, hormone use.

## 2019-03-07 NOTE — ED CDU PROVIDER DISPOSITION NOTE - ATTENDING CONTRIBUTION TO CARE
38F, pmh mild intermittent asthma (previously intubated), BPPV, s/p lap band, PE on eliquis, presented to ED with sob beginning in AM, found to be hypoxic on pulse ox at home. EMS reported wheeze, decreased breath sounds, gave magnesium, steroids, nebs. Pt felt sig improved after these interventions. Pt did have some intermittent chest pressure earlier today, which resolved prior to ED visit. Pt also reporting increasing anxiety since the recent death of her grandmother.    In ED, pt had labwork performed which was non-actionable, neg trop, and bedside TTE in ED which did not reveal sig abnormality. Presentation was felt to be c/w acute asthma exacerbation, and pt sent to CDU for further monitoring and nebs if necessary.    In CDU, pt felt sig improved, no neb treatments needed, and had spo2 in upper 90's. Pt felt asymptomatic throughout CDU stay and wished to be d/c'd home. Negative trop x 2, normal TTE, resolution of sx made likelihood of significant PE extremely unlikely. Return precautions and importance of med adherence discussed with patient in detail at time of discharge. Pt to also f/u with cardiology within next few days re persistent chest discomfort since onset of PE. - Sherman Shaffer MD

## 2019-03-07 NOTE — ED PROVIDER NOTE - PHYSICAL EXAMINATION
GEN: Pt in NAD, A&O x3.  PSYCH: Affect appropriate.  EYES: Sclera white w/o injection. PERRLA. EOMI with fast beat fatigable rightward horizontal nystagmus.   ENT: Head NCAT. Nose w/o deformity. No auricular TTP. MMM. Neck supple FROM, trachea midline, no JVD.  RESP: No chest wall tenderness, CTA b/l, no wheezes, rales, or rhonchi.   CARDIAC: RRR, clear distinct S1, S2, no appreciable murmurs.  ABD: Abdomen soft, non-tender.  VASC: 2+ radial and dorsalis pedis pulses b/l. No edema or calf tenderness.  SKIN: No rashes on the trunk. GEN: Pt in NAD, A&O x3.  PSYCH: Affect appropriate.  EYES: Sclera white w/o injection. PERRLA. EOMI with fast beat fatigable rightward horizontal nystagmus.   ENT: Head NCAT. Nose w/o deformity. No auricular TTP. MMM. Neck supple FROM, trachea midline, no JVD.  RESP: No chest wall tenderness, CTA b/l, no wheezes, rales, or rhonchi.   CARDIAC: RRR, clear distinct S1, S2, no appreciable murmurs.  ABD: Abdomen soft, non-tender.  VASC: 2+ radial and dorsalis pedis pulses b/l. No edema or calf tenderness.  SKIN: No rashes on the trunk.      Dr rashaad magana --

## 2019-03-07 NOTE — ED ADULT NURSE NOTE - CHPI ED NUR SYMPTOMS NEG
no cough/no edema/no headache/no hemoptysis/no wheezing/no body aches/no chills/no shortness of breath/no chest pain/no diaphoresis/no fever

## 2019-03-07 NOTE — ED CDU PROVIDER INITIAL DAY NOTE - ATTENDING CONTRIBUTION TO CARE
I have seen and evaluated this patient with the [place] practice clinician.   I agree with the findings  unless other wise stated. I have amended notes where needed.  After my face to face bedside evaluation, I am noting: Pt with RAD and morbid obesity has h/o PE in past on Elliquis less than perfect compliance no fever has sob at rest worse with exertion abdomen soft non pitting edema concern for PE and RAD PPt took her usual dose of elliquis this am SaO2 99% room air no tachypnea see detailed exam labs to r/o significant PE trop and BNP re eval --Jaime

## 2019-03-07 NOTE — ED CDU PROVIDER INITIAL DAY NOTE - PROGRESS NOTE DETAILS
CDU PROGRESS NOTE JIMMY SPEARS: Received pt from JIMMY Vargas at 1900 sign-out. Pt resting in stretcher in NAD. Case/plan reviewed. VSS. Pt without complaints. Will continue to monitor overnight. CDU NOTE JIMMY SPEARS: Pt resting comfortably, feeling well without complaint. denies SOB and CP. states she feels she was just anxious before and felt better after she got xanax. states she would like to go home as she is feeling better. NAD, VSS. pulse ox 98-99. No events on telemetry. CV: S1S2 RRR, Lungs: CTA b/l. spoke with Dr. Shaffer regarding case and plan, recommends another trop if negative may dc home with outpt cardio f/u. CDU NOTE JIMMY SPEARS: Pt resting comfortably, feeling well without complaint. NAD, VSS. No events on telemetry. 2nd trop negative. pulse ox 97. case and plan discussed with Dr. Shaffer; pt stable for discharge. Pt re-evaluated at this time. Denying any sx. breathing unlabored, lungs CTAB. VSS. 2nd trop neg. Pt asking to be d/c'd home. I discussed importance of adherence to meds including eliquis, pt expresses understanding. she will f/u with her pmd, hematology, and cardiology. return precautiosn discussed in detail, including but not limited to recurrent sob, cp, palpitations, or anythign else she finds concerning, if these present she will return. given resolution of sx after treatment for asthma exacerbation, presentation remains c/w asthma exacerbation. an opportunity to ask questions was provided and all answered. stable for d/c. - Sherman Shaffer MD

## 2019-03-07 NOTE — ED CDU PROVIDER DISPOSITION NOTE - NSFOLLOWUPINSTRUCTIONS_ED_ALL_ED_FT
1. Take all of your medications as previously prescribed. Do not miss doses of your Eliquis.   2. Follow up with your PMD and our cardiology clinic within 48-72 hours. Show copies of your reports given to you.   3. Worsening or continued chest pain, shortness of breath, weakness, return to ED.

## 2019-03-07 NOTE — ED CDU PROVIDER DISPOSITION NOTE - CLINICAL COURSE
37 y/o F with PMHx of mild intermittent asthma (intubated x3 as 14mo, 9 yo and 13 yo; ICU @13 yo; well controlled after childhood), BPPV, morbid obesity s/p lap band sx, PE dx'ed in Nov likely 2/2 antiphospholipid syndrome on Eliquis BIBEMS c/o SOB since this AM. 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 pt reports increased stress/anxiety, recent death of her grandmother and today is her Grandmother's birthday. Pt states she did feel chest pressure today, has had chest pressure on and off since dx PE but worse today. Pt denies f/c, HA, back pain, leg pain/swelling, abd pain, n/v, cough, hemoptysis, hormone use.  In ED, trop negative, BNP negative, bedside TTE negative, CXR negative, other labs unremarkable. pt sent to CDU for continued telemetry and pulse ox monitoring.   In CDU, 2nd trop negative, no events on telemetry, lungs clear, didn't need any neb txs, pulse ox . pt attributing symptoms to anxiety. pt asymptomatic after getting xanax.

## 2019-03-07 NOTE — ED PROVIDER NOTE - ATTENDING CONTRIBUTION TO CARE
I have seen and evaluated this patient with the [place] practice clinician.   I agree with the findings  unless other wise stated. I have amended notes where needed.  After my face to face bedside evaluation, I am noting: Pt with RAD and morbid obesity has h/o PE in past on Elliquis less than perfect compliance no fever has sob at rest worse with exertion abdomen soft non pitting edema concern for PE and RAD PPt took her usual dose of elliquis this am SaO2 99% room air no tachypnea see detailed exam labs to r/o significant PE trop and BNP re eval --Jaime

## 2019-03-07 NOTE — ED PROVIDER NOTE - CLINICAL SUMMARY MEDICAL DECISION MAKING FREE TEXT BOX
Pt with RAD and morbid obesity has h/o PE in past on Elliquis less than perfect compliance no fever has sob at rest worse with exertion abdomen soft non pitting edema concern for PE and RAD PPt took her usual dose of elliquis this am SaO2 99% room air no tachypnea see detailed exam labs to r/o significant PE trop and BNP re eval --Jaime

## 2019-03-07 NOTE — ED ADULT NURSE NOTE - NSIMPLEMENTINTERV_GEN_ALL_ED
Implemented All Universal Safety Interventions:  [place] to call system. Call bell, personal items and telephone within reach. Instruct patient to call for assistance. Room bathroom lighting operational. Non-slip footwear when patient is off stretcher. Physically safe environment: no spills, clutter or unnecessary equipment. Stretcher in lowest position, wheels locked, appropriate side rails in place.

## 2019-03-07 NOTE — ED CDU PROVIDER INITIAL DAY NOTE - OBJECTIVE STATEMENT
39 y/o F with PMHx of mild intermittent asthma (intubated x3 as 14mo, 7 yo and 11 yo; ICU @11 yo; well controlled after childhood), BPPV, morbid obesity s/p lap band sx, PE dx'ed in Nov likely 2/2 antiphospholipid syndrome on Eliquis BIBEMS c/o SOB since this AM. 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 pt reports increased stress/anxiety, recent death of her grandmother and today is her Grandmother's birthday. Pt states she did feel chest pressure today, has had chest pressure on and off since dx PE but worse today.   Pt denies f/c, HA, back pain, leg pain/swelling, abd pain, n/v, cough, hemoptysis, hormone use.

## 2019-03-08 NOTE — ED ADULT NURSE REASSESSMENT NOTE - NS ED NURSE REASSESS COMMENT FT1
Pt discharged as per provider. Teaching done by JIMMY Dupont. Pt verbalizes understanding to discharge orders & will follow up with PMD post discharge. IV lock removed. No bleeding noted. Pt stable upon discharge.
Pt received from GINNY Neville. Pt oriented to CDU & plan of care was discussed. Pt A&O x 4. Pt in CDU for tele, continuous pulse ox. Pt denies any SOB, dizziness, or difficulty breathing as of now. Pt states she has been really stress with school,  and her grandma passing. Pt on a cardiac monitor in NSR, HR in 70's. Safety & comfort measures maintained. Call bell in reach. Will continue to monitor.

## 2019-04-10 ENCOUNTER — TRANSCRIPTION ENCOUNTER (OUTPATIENT)
Age: 39
End: 2019-04-10

## 2019-04-10 ENCOUNTER — RX RENEWAL (OUTPATIENT)
Age: 39
End: 2019-04-10

## 2019-04-24 ENCOUNTER — APPOINTMENT (OUTPATIENT)
Dept: CARDIOLOGY | Facility: CLINIC | Age: 39
End: 2019-04-24

## 2019-05-09 ENCOUNTER — APPOINTMENT (OUTPATIENT)
Dept: PULMONOLOGY | Facility: CLINIC | Age: 39
End: 2019-05-09

## 2019-07-13 ENCOUNTER — TRANSCRIPTION ENCOUNTER (OUTPATIENT)
Age: 39
End: 2019-07-13

## 2019-07-22 ENCOUNTER — APPOINTMENT (OUTPATIENT)
Dept: PULMONOLOGY | Facility: CLINIC | Age: 39
End: 2019-07-22
Payer: COMMERCIAL

## 2019-07-22 ENCOUNTER — APPOINTMENT (OUTPATIENT)
Dept: ORTHOPEDIC SURGERY | Facility: CLINIC | Age: 39
End: 2019-07-22
Payer: COMMERCIAL

## 2019-07-22 VITALS
DIASTOLIC BLOOD PRESSURE: 80 MMHG | BODY MASS INDEX: 49.61 KG/M2 | WEIGHT: 280 LBS | OXYGEN SATURATION: 98 % | SYSTOLIC BLOOD PRESSURE: 130 MMHG | HEART RATE: 74 BPM | RESPIRATION RATE: 17 BRPM | HEIGHT: 63 IN

## 2019-07-22 VITALS
HEIGHT: 63 IN | BODY MASS INDEX: 49.61 KG/M2 | DIASTOLIC BLOOD PRESSURE: 87 MMHG | WEIGHT: 280 LBS | SYSTOLIC BLOOD PRESSURE: 126 MMHG | HEART RATE: 78 BPM

## 2019-07-22 DIAGNOSIS — Z82.62 FAMILY HISTORY OF OSTEOPOROSIS: ICD-10-CM

## 2019-07-22 DIAGNOSIS — Z82.61 FAMILY HISTORY OF ARTHRITIS: ICD-10-CM

## 2019-07-22 DIAGNOSIS — Z78.9 OTHER SPECIFIED HEALTH STATUS: ICD-10-CM

## 2019-07-22 DIAGNOSIS — Z80.9 FAMILY HISTORY OF MALIGNANT NEOPLASM, UNSPECIFIED: ICD-10-CM

## 2019-07-22 DIAGNOSIS — J98.19 OTHER PULMONARY COLLAPSE: ICD-10-CM

## 2019-07-22 PROCEDURE — 99204 OFFICE O/P NEW MOD 45 MIN: CPT | Mod: 25

## 2019-07-22 PROCEDURE — ZZZZZ: CPT

## 2019-07-22 PROCEDURE — 94729 DIFFUSING CAPACITY: CPT

## 2019-07-22 PROCEDURE — 94727 GAS DIL/WSHOT DETER LNG VOL: CPT

## 2019-07-22 PROCEDURE — 73130 X-RAY EXAM OF HAND: CPT | Mod: 50

## 2019-07-22 PROCEDURE — 99204 OFFICE O/P NEW MOD 45 MIN: CPT

## 2019-07-22 PROCEDURE — 94060 EVALUATION OF WHEEZING: CPT

## 2019-07-22 NOTE — PROCEDURE
[FreeTextEntry1] : PFT 7/22/2019 personally reviewed mild obstructive ventilatory defect without gas exchange abnormality and mild bronchodilator response.

## 2019-07-22 NOTE — ASSESSMENT
[FreeTextEntry1] : 38 year old female Hx bilateral pulmonary emboli 11/2018 presents for evaluation, Hx asthma\par \par Cardiology referral/ECHO\par Rheumatology referral\par Initiate Breo-Ellipta 100-25 daily.\par Rescue inhaler as directed\par Consider VQ scan for CTEPH if appropriate after ECHO\par \par Follow up 4-6 weeks

## 2019-07-22 NOTE — HISTORY OF PRESENT ILLNESS
[FreeTextEntry1] : Patient is a 38 year old female Hx bilateral pulmonary emboli 11/2018, unprovoked, Hx asthma,  presents to [facility] for evaluation.  Patient has been on Eliquis since that time.  She has followed with Hematology however no underlying contributing diagnosis was determined.  Patient reports she is CHINO positive.  She is complaining of fatigue. She denies snoring.  She has had diagnostic PSG in past which was negative.  She denies chest pain, cough or systemic complaints.  patient has not been evaluated by rheumatology or Cardiology.  She is here for symptoms of fatigue.

## 2019-07-22 NOTE — PHYSICAL EXAM
[General Appearance - Well Developed] : well developed [General Appearance - Well Nourished] : well nourished [General Appearance - In No Acute Distress] : no acute distress [Normal Conjunctiva] : the conjunctiva exhibited no abnormalities [Erythema] : erythema of the pharynx [] : the neck was supple [Jugular Venous Distention Increased] : there was no jugular-venous distention [Heart Sounds] : normal S1 and S2 [Respiration, Rhythm And Depth] : normal respiratory rhythm and effort [Auscultation Breath Sounds / Voice Sounds] : lungs were clear to auscultation bilaterally [Abdomen Soft] : soft [Nail Clubbing] : no clubbing of the fingernails [Cyanosis, Localized] : no localized cyanosis [Non-Pitting] : non-pitting [Cranial Nerves] : cranial nerves 2-12 were intact [No Focal Deficits] : no focal deficits [Oriented To Time, Place, And Person] : oriented to person, place, and time [FreeTextEntry1] : Morbidly obese

## 2019-07-22 NOTE — REVIEW OF SYSTEMS
[Fatigue] : fatigue [Recent Wt Gain (___ Lbs)] : recent [unfilled] ~Ulb weight gain [Reflux] : reflux [Pulmonary Embolism] : pulmonary embolism [As Noted in HPI] : as noted in HPI [Negative] : Sleep Disorder

## 2019-08-02 ENCOUNTER — TRANSCRIPTION ENCOUNTER (OUTPATIENT)
Age: 39
End: 2019-08-02

## 2019-08-09 NOTE — PROGRESS NOTE ADULT - SUBJECTIVE AND OBJECTIVE BOX
rt sided facial pain returning fr dc 8/7/19  0406 pt drinking cold water in triage unable to get temp oral reading CARDIOLOGY FOLLOW UP - Dr. Tavarez    CC no cp/sob   tele events noted       PHYSICAL EXAM:  T(C): 36.7 (11-02-18 @ 04:44), Max: 36.7 (11-01-18 @ 20:58)  HR: 80 (11-02-18 @ 04:44) (80 - 89)  BP: 112/76 (11-02-18 @ 04:44) (101/69 - 122/83)  RR: 18 (11-02-18 @ 04:44) (18 - 18)  SpO2: 96% (11-02-18 @ 04:44) (96% - 96%)  Wt(kg): --  I&O's Summary    01 Nov 2018 07:01  -  02 Nov 2018 07:00  --------------------------------------------------------  IN: 420 mL / OUT: 0 mL / NET: 420 mL        Appearance: Normal	  Cardiovascular: Normal S1 S2,RRR, No JVD, No murmurs  Respiratory: Lungs clear to auscultation	  Gastrointestinal:  Soft, Non-tender, + BS	  Extremities: Normal range of motion, No clubbing, cyanosis or edema        MEDICATIONS  (STANDING):  apixaban 10 milliGRAM(s) Oral every 12 hours  sodium chloride 0.9% lock flush 3 milliLiter(s) IV Push every 8 hours  tiotropium 18 MICROgram(s) Capsule 1 Capsule(s) Inhalation daily      TELEMETRY: 	    ECG:  NSR 	  RADIOLOGY:   DIAGNOSTIC TESTING:  [ ] Echocardiogram:  [ ]  Catheterization:  [ ] Stress Test:    OTHER: 	    LABS:	 	                                11.3   9.4   )-----------( 329      ( 02 Nov 2018 06:16 )             37.0     11-02    138  |  104  |  17  ----------------------------<  95  5.0   |  24  |  0.88    Ca    9.3      02 Nov 2018 06:13      PTT - ( 02 Nov 2018 06:14 )  PTT:31.6 sec

## 2019-08-13 ENCOUNTER — APPOINTMENT (OUTPATIENT)
Dept: COLORECTAL SURGERY | Facility: CLINIC | Age: 39
End: 2019-08-13

## 2019-08-19 ENCOUNTER — APPOINTMENT (OUTPATIENT)
Dept: PULMONOLOGY | Facility: CLINIC | Age: 39
End: 2019-08-19

## 2019-09-06 ENCOUNTER — APPOINTMENT (OUTPATIENT)
Dept: ORTHOPEDIC SURGERY | Facility: CLINIC | Age: 39
End: 2019-09-06

## 2019-09-23 ENCOUNTER — APPOINTMENT (OUTPATIENT)
Dept: RHEUMATOLOGY | Facility: CLINIC | Age: 39
End: 2019-09-23

## 2019-09-23 ENCOUNTER — APPOINTMENT (OUTPATIENT)
Dept: INTERNAL MEDICINE | Facility: CLINIC | Age: 39
End: 2019-09-23

## 2019-09-25 ENCOUNTER — EMERGENCY (EMERGENCY)
Facility: HOSPITAL | Age: 39
LOS: 1 days | Discharge: HOME | End: 2019-09-25
Attending: EMERGENCY MEDICINE | Admitting: EMERGENCY MEDICINE
Payer: COMMERCIAL

## 2019-09-25 VITALS
SYSTOLIC BLOOD PRESSURE: 110 MMHG | OXYGEN SATURATION: 98 % | HEART RATE: 95 BPM | DIASTOLIC BLOOD PRESSURE: 54 MMHG | RESPIRATION RATE: 18 BRPM | TEMPERATURE: 100 F

## 2019-09-25 VITALS
TEMPERATURE: 102 F | RESPIRATION RATE: 18 BRPM | SYSTOLIC BLOOD PRESSURE: 134 MMHG | HEART RATE: 95 BPM | DIASTOLIC BLOOD PRESSURE: 95 MMHG | OXYGEN SATURATION: 99 %

## 2019-09-25 DIAGNOSIS — Z98.84 BARIATRIC SURGERY STATUS: Chronic | ICD-10-CM

## 2019-09-25 PROCEDURE — 99284 EMERGENCY DEPT VISIT MOD MDM: CPT

## 2019-09-25 RX ORDER — SODIUM CHLORIDE 9 MG/ML
1000 INJECTION, SOLUTION INTRAVENOUS ONCE
Refills: 0 | Status: COMPLETED | OUTPATIENT
Start: 2019-09-25 | End: 2019-09-25

## 2019-09-25 RX ORDER — DEXAMETHASONE 0.5 MG/5ML
4 ELIXIR ORAL ONCE
Refills: 0 | Status: COMPLETED | OUTPATIENT
Start: 2019-09-25 | End: 2019-09-25

## 2019-09-25 RX ORDER — DEXAMETHASONE 0.5 MG/5ML
4 ELIXIR ORAL ONCE
Refills: 0 | Status: DISCONTINUED | OUTPATIENT
Start: 2019-09-25 | End: 2019-09-25

## 2019-09-25 RX ORDER — ONDANSETRON 8 MG/1
4 TABLET, FILM COATED ORAL ONCE
Refills: 0 | Status: COMPLETED | OUTPATIENT
Start: 2019-09-25 | End: 2019-09-25

## 2019-09-25 RX ORDER — IBUPROFEN 200 MG
600 TABLET ORAL ONCE
Refills: 0 | Status: DISCONTINUED | OUTPATIENT
Start: 2019-09-25 | End: 2019-09-25

## 2019-09-25 RX ORDER — ONDANSETRON 8 MG/1
4 TABLET, FILM COATED ORAL ONCE
Refills: 0 | Status: DISCONTINUED | OUTPATIENT
Start: 2019-09-25 | End: 2019-09-25

## 2019-09-25 RX ORDER — PENICILLIN G BENZATHINE 1200000 [IU]/2ML
1.2 INJECTION, SUSPENSION INTRAMUSCULAR ONCE
Refills: 0 | Status: COMPLETED | OUTPATIENT
Start: 2019-09-25 | End: 2019-09-25

## 2019-09-25 RX ORDER — ACETAMINOPHEN 500 MG
650 TABLET ORAL ONCE
Refills: 0 | Status: DISCONTINUED | OUTPATIENT
Start: 2019-09-25 | End: 2019-09-25

## 2019-09-25 RX ORDER — ACETAMINOPHEN 500 MG
1000 TABLET ORAL ONCE
Refills: 0 | Status: COMPLETED | OUTPATIENT
Start: 2019-09-25 | End: 2019-09-25

## 2019-09-25 RX ADMIN — SODIUM CHLORIDE 1000 MILLILITER(S): 9 INJECTION, SOLUTION INTRAVENOUS at 22:35

## 2019-09-25 RX ADMIN — Medication 4 MILLIGRAM(S): at 22:34

## 2019-09-25 RX ADMIN — PENICILLIN G BENZATHINE 1.2 MILLION UNIT(S): 1200000 INJECTION, SUSPENSION INTRAMUSCULAR at 23:08

## 2019-09-25 RX ADMIN — Medication 400 MILLIGRAM(S): at 22:34

## 2019-09-25 RX ADMIN — ONDANSETRON 4 MILLIGRAM(S): 8 TABLET, FILM COATED ORAL at 22:34

## 2019-09-25 NOTE — ED PROVIDER NOTE - NSFOLLOWUPINSTRUCTIONS_ED_ALL_ED_FT
Pharyngitis    Pharyngitis is inflammation of your pharynx, which is typically caused by a viral or bacterial infection. Pharyngitis can be contagious and may spread from person to person through intimate contact, coughing, sneezing, or sharing personal items and utensils. Symptoms of pharyngitis may include sore throat, fever, headache, or swollen lymph nodes. If you are prescribed antibiotics, make sure you finish them even if you start to feel better. Gargle with salt water as often as every 1-2 hours to soothe your throat. Throat lozenges (if you are not at risk for choking) or sprays may be used to soothe your throat.    SEEK IMMEDIATE MEDICAL CARE IF YOU HAVE ANY OF THE FOLLOWING SYMPTOMS: neck stiffness, drooling, hoarseness or change in voice, inability to swallow liquids, vomiting, or trouble breathing.    - Follow up with your primary care doctor in 1-2 days.    - Bring results with you to the appointment.   - Take tylenol 650mg or motrin 600mg every 6 hours for pain or fever.   - Return to the ED for new or worsening symptoms.

## 2019-09-25 NOTE — ED PROVIDER NOTE - PATIENT PORTAL LINK FT
You can access the FollowMyHealth Patient Portal offered by [facility] by registering at the following website: [email or web address]. By joining [facility]’s FollowMyHealth portal, you will also be able to view your health information using other applications (apps) compatible with our system.

## 2019-09-25 NOTE — ED ADULT NURSE NOTE - NSIMPLEMENTINTERV_GEN_ALL_ED
Implemented All Universal Safety Interventions:  [place] to call system. Call bell, personal items and telephone within reach. Instruct patient to call for assistance. Room bathroom lighting operational. Non-slip footwear when patient is off stretcher. Physically safe environment: no spills, clutter or unnecessary equipment. Stretcher in lowest position, wheels locked, appropriate side rails in place.

## 2019-09-25 NOTE — ED PROVIDER NOTE - NS ED ROS FT
General: + fever, chills  HENT: denies nasal congestion, rhinorrhea, + sore throat  CV: denies chest pain, palpitations  Resp: denies difficulty breathing, cough  Abdominal: + nausea, denies vomiting, abdominal pain  MSK: denies muscle aches, leg swelling  Neuro: denies headaches, numbness, tingling  Skin: denies rashes, bruises

## 2019-09-25 NOTE — ED PROVIDER NOTE - ATTENDING CONTRIBUTION TO CARE
39F p/w sore throat, nausea ,fever, diff swallowing, no vomiting x 3-4 days.  Exudates on tonsils, pharyngeal erythema.  feeling fatigued and malaise, difficulty swallowing and decr PO intake.  likely strep pharyngitis, rx abx, steroids, pain meds, rx fluids, reass.  IM PCN.  IF ambulatory and to PO, d/c home f/u PMD.  H/o PE's on eliquis.    VS:  unremarkable except fever.    GEN - mild distress sore throat, malaise; A+O x3   HEAD - NC/AT     ENT - PEERL, EOMI, mucous membranes dry, no discharge    tonsillar exudates, mild swelling, no unilateral swelling; pharynx reddened.  managing secretions, voice mild hoarseness but able to swallow.   NECK: Neck supple, non-tender without lymphadenopathy, no masses, no JVD  PULM - CTA b/l,  symmetric breath sounds  COR -  normal heart sounds    ABD - , ND, NT, soft,  BACK - no CVA tenderness, nontender spine     EXTREMS - no edema, no deformity, warm and well perfused    SKIN - no rash or bruising      NEUROLOGIC - alert, CN 2-12 intact, sensation nl, motor no focal deficit.

## 2019-09-25 NOTE — ED PROVIDER NOTE - PMH
Asthma    Knee dislocation, left, sequela    Meniscus tear    Morbid obesity    Polyp  rectal  Pulmonary embolism    Vertigo, benign positional

## 2019-09-25 NOTE — ED PROVIDER NOTE - PHYSICAL EXAMINATION
CONSTITUTIONAL: awake, alert  HEAD: Normocephalic; atraumatic, + exudates on tonsils, + pharyngeal erythema   ENMT: External appears normal, MMM  CARD: Normal Sl, S2; no audible murmurs  RESP: normal wob, lungs ctab  ABD: soft, non-distended; non-tender  MSK: no edema, normal ROM in all four extremities  SKIN: Warm, dry, no rashes  NEURO: aaox3, moving all extremities spontaneously

## 2019-09-25 NOTE — ED PROVIDER NOTE - OBJECTIVE STATEMENT
39F w/ h/o asthma p/w sore throat, fevers up to 103, nausea, and difficulty swallowing since yesterday. 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 Denies chest pain, SOB, cough, vomiting.  States she tried motrin tylenol at home w/o relief. Last tylenol at 3 pm. Last motrin last night. H/o unprovoked PE 1 year ago, on Eliquis

## 2019-09-25 NOTE — ED ADULT TRIAGE NOTE - CHIEF COMPLAINT QUOTE
Pt. c/o fever and sore throat that began yesterday. Also endorses nausea and chills. States she had temperature of 103 at 4pm. Reports taking 650mg of tylenol at 3pm. Denies vomiting, SOB. Speaking in full sentences without difficulty.

## 2019-09-25 NOTE — ED ADULT NURSE NOTE - OBJECTIVE STATEMENT
pt received alert and oriented x3. pt c.o having one day of fevers,heachache,  nausea pt received alert and oriented x3. pt c.o having one day of fevers, heachache,  nausea and sore throat for one day. respirations equal and unlabored. airway patent. 20g placed in left a/c. medications given as ordered. Call bell in reach, warm blanket provided, bed in lowest position, side rails up x2,safety maintained. will continue to monitor.

## 2019-09-26 RX ORDER — PANTOPRAZOLE SODIUM 20 MG/1
80 TABLET, DELAYED RELEASE ORAL ONCE
Refills: 0 | Status: DISCONTINUED | OUTPATIENT
Start: 2019-09-26 | End: 2019-09-26

## 2019-12-23 ENCOUNTER — NON-APPOINTMENT (OUTPATIENT)
Age: 39
End: 2019-12-23

## 2019-12-23 ENCOUNTER — APPOINTMENT (OUTPATIENT)
Dept: INTERNAL MEDICINE | Facility: CLINIC | Age: 39
End: 2019-12-23
Payer: COMMERCIAL

## 2019-12-23 VITALS
WEIGHT: 293 LBS | BODY MASS INDEX: 51.91 KG/M2 | HEIGHT: 63 IN | DIASTOLIC BLOOD PRESSURE: 82 MMHG | SYSTOLIC BLOOD PRESSURE: 124 MMHG | HEART RATE: 81 BPM

## 2019-12-23 DIAGNOSIS — F41.9 ANXIETY DISORDER, UNSPECIFIED: ICD-10-CM

## 2019-12-23 DIAGNOSIS — E66.9 OBESITY, UNSPECIFIED: ICD-10-CM

## 2019-12-23 PROCEDURE — G0009: CPT

## 2019-12-23 PROCEDURE — 90732 PPSV23 VACC 2 YRS+ SUBQ/IM: CPT

## 2019-12-23 PROCEDURE — 93000 ELECTROCARDIOGRAM COMPLETE: CPT

## 2019-12-23 PROCEDURE — 99395 PREV VISIT EST AGE 18-39: CPT | Mod: 25

## 2019-12-23 RX ORDER — APIXABAN 5 MG/1
5 TABLET, FILM COATED ORAL
Qty: 180 | Refills: 3 | Status: DISCONTINUED | COMMUNITY
Start: 1900-01-01 | End: 2019-12-23

## 2019-12-23 NOTE — HISTORY OF PRESENT ILLNESS
[de-identified] : her for annual exam \par \par 40 y/o obese female with h/o b/l PE, unprovoked.  has been on eliquis for the last 12 months until advised by hem/onco to d/c due to workup being negative.  follows with hematologist with [facility]\par mild sob at times but nothing like she had during the event.  was seen by pulm.  was advised to f/u with rheum and cardio which she has yet to do.  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

## 2019-12-23 NOTE — PLAN
[FreeTextEntry1] : \par obese, s/p partial gastrectomy \par -f/u with bariatric surg for possible revision \par -Being overweight increases your risk of health conditions such as heart disease, high blood pressure, type 2 diabetes, and certain types of cancer. It can also increase your risk for osteoarthritis, sleep apnea, and other respiratory problems. Aim for a slow, steady weight loss. Even a small amount of weight loss can lower your risk of health problems.\par \par Anxiety, \par -xanax as needed, risks and benefits of medication discussed in detail. \par -encouraged relaxation, tech, deep breathing exercises, yoga, acupuncture\par -follow-up with therapist \par \par B/L PE s/p eliquis x1 year. unsure underlying cause but appears unprovoked.  states workup as been negative to date. fmhx of autoimmune disorder, can be seronegative? autoimmune.  -f/u with cardio, rhem -would continue aspirin for now while off AC.  -f/u with pulm as directed \par \par Annual \par -Advised to get yearly Flu shot -alrdy received\par -Advised Yearly Eye exam with Ophthalmologist\par -Advised Yearly Dental exam\par -Educated of the importance of Healthy diet, such as Mediterranean Diet and Exercise, such as walking >20 minutes a day and increasing gradually as tolerated\par \par Preventative screening \par -advised to get PAP for cervical cancer screening \par -prevnar -received 5 years ago\par -pneumovax -given today \par -shingrix sent to pharmacy\par \par \par \par \par \par \par \par \par

## 2019-12-23 NOTE — PHYSICAL EXAM
[Normal] : normal rate, regular rhythm, normal S1 and S2 and no murmur heard [Declined Breast Exam] : declined breast exam  [Soft] : abdomen soft [Non Tender] : non-tender [Non-distended] : non-distended [Normal Posterior Cervical Nodes] : no posterior cervical lymphadenopathy [Normal Anterior Cervical Nodes] : no anterior cervical lymphadenopathy [No CVA Tenderness] : no CVA  tenderness [No Spinal Tenderness] : no spinal tenderness [Grossly Normal Strength/Tone] : grossly normal strength/tone [No Joint Swelling] : no joint swelling [No Rash] : no rash [Coordination Grossly Intact] : coordination grossly intact [No Focal Deficits] : no focal deficits [Normal Gait] : normal gait [Normal Mood] : the mood was normal [Normal Affect] : the affect was normal [de-identified] : +2 pitting edema bilaterally.  +varicose veins

## 2019-12-23 NOTE — COUNSELING
[Encouraged to use exercise tracking device] : Encouraged to use exercise tracking device [Encouraged to increase physical activity] : Encouraged to increase physical activity [____ min/wk Activity] : [unfilled] min/wk activity [Decrease Portions] : decrease portions [Needs reinforcement, provided] : Patient needs reinforcement on understanding of disease, goals and obesity follow-up plan; reinforcement was provided

## 2019-12-27 DIAGNOSIS — D50.9 IRON DEFICIENCY ANEMIA, UNSPECIFIED: ICD-10-CM

## 2019-12-27 LAB
25(OH)D3 SERPL-MCNC: 15.5 NG/ML
ALBUMIN SERPL ELPH-MCNC: 4.2 G/DL
ALP BLD-CCNC: 95 U/L
ALT SERPL-CCNC: 8 U/L
ANION GAP SERPL CALC-SCNC: 11 MMOL/L
APPEARANCE: CLEAR
AST SERPL-CCNC: 13 U/L
BACTERIA: NEGATIVE
BASOPHILS # BLD AUTO: 0.04 K/UL
BASOPHILS NFR BLD AUTO: 0.5 %
BILIRUB DIRECT SERPL-MCNC: 0.1 MG/DL
BILIRUB INDIRECT SERPL-MCNC: 0.2 MG/DL
BILIRUB SERPL-MCNC: 0.3 MG/DL
BILIRUBIN URINE: NEGATIVE
BLOOD URINE: NEGATIVE
BUN SERPL-MCNC: 10 MG/DL
CALCIUM SERPL-MCNC: 9.3 MG/DL
CHLORIDE SERPL-SCNC: 103 MMOL/L
CHOLEST SERPL-MCNC: 175 MG/DL
CHOLEST/HDLC SERPL: 2.5 RATIO
CO2 SERPL-SCNC: 25 MMOL/L
COLOR: YELLOW
CREAT SERPL-MCNC: 0.68 MG/DL
EOSINOPHIL # BLD AUTO: 0.24 K/UL
EOSINOPHIL NFR BLD AUTO: 2.8 %
ESTIMATED AVERAGE GLUCOSE: 117 MG/DL
FERRITIN SERPL-MCNC: 9 NG/ML
FOLATE SERPL-MCNC: 9.9 NG/ML
GLUCOSE QUALITATIVE U: NEGATIVE
GLUCOSE SERPL-MCNC: 91 MG/DL
HBA1C MFR BLD HPLC: 5.7 %
HCT VFR BLD CALC: 38 %
HDLC SERPL-MCNC: 70 MG/DL
HGB BLD-MCNC: 11.3 G/DL
HYALINE CASTS: 0 /LPF
IMM GRANULOCYTES NFR BLD AUTO: 0.2 %
IRON SATN MFR SERPL: 6 %
IRON SERPL-MCNC: 23 UG/DL
KETONES URINE: NEGATIVE
LDLC SERPL CALC-MCNC: 87 MG/DL
LEUKOCYTE ESTERASE URINE: NEGATIVE
LYMPHOCYTES # BLD AUTO: 2.23 K/UL
LYMPHOCYTES NFR BLD AUTO: 26.1 %
MAN DIFF?: NORMAL
MCHC RBC-ENTMCNC: 24.4 PG
MCHC RBC-ENTMCNC: 29.7 GM/DL
MCV RBC AUTO: 82.1 FL
MICROSCOPIC-UA: NORMAL
MONOCYTES # BLD AUTO: 0.76 K/UL
MONOCYTES NFR BLD AUTO: 8.9 %
NEUTROPHILS # BLD AUTO: 5.27 K/UL
NEUTROPHILS NFR BLD AUTO: 61.5 %
NITRITE URINE: NEGATIVE
PH URINE: 8
PLATELET # BLD AUTO: 438 K/UL
POTASSIUM SERPL-SCNC: 4.7 MMOL/L
PROT SERPL-MCNC: 7.1 G/DL
PROTEIN URINE: NORMAL
RBC # BLD: 4.63 M/UL
RBC # FLD: 16.2 %
RED BLOOD CELLS URINE: 27 /HPF
SAVE SPECIMEN: NORMAL
SODIUM SERPL-SCNC: 139 MMOL/L
SPECIFIC GRAVITY URINE: 1.02
SQUAMOUS EPITHELIAL CELLS: 4 /HPF
TIBC SERPL-MCNC: 412 UG/DL
TRIGL SERPL-MCNC: 89 MG/DL
TSH SERPL-ACNC: 1.77 UIU/ML
UIBC SERPL-MCNC: 389 UG/DL
UROBILINOGEN URINE: NORMAL
VIT B12 SERPL-MCNC: 426 PG/ML
WBC # FLD AUTO: 8.56 K/UL
WHITE BLOOD CELLS URINE: 1 /HPF

## 2020-01-13 ENCOUNTER — APPOINTMENT (OUTPATIENT)
Dept: HUMAN REPRODUCTION | Facility: CLINIC | Age: 40
End: 2020-01-13

## 2020-01-15 ENCOUNTER — APPOINTMENT (OUTPATIENT)
Dept: CARDIOLOGY | Facility: CLINIC | Age: 40
End: 2020-01-15

## 2020-02-14 ENCOUNTER — APPOINTMENT (OUTPATIENT)
Dept: RHEUMATOLOGY | Facility: CLINIC | Age: 40
End: 2020-02-14

## 2020-02-14 DIAGNOSIS — E55.9 VITAMIN D DEFICIENCY, UNSPECIFIED: ICD-10-CM

## 2020-04-26 ENCOUNTER — MESSAGE (OUTPATIENT)
Age: 40
End: 2020-04-26

## 2020-06-03 ENCOUNTER — APPOINTMENT (OUTPATIENT)
Dept: HUMAN REPRODUCTION | Facility: CLINIC | Age: 40
End: 2020-06-03
Payer: COMMERCIAL

## 2020-06-03 PROCEDURE — 99215 OFFICE O/P EST HI 40 MIN: CPT | Mod: 95

## 2020-06-12 ENCOUNTER — OUTPATIENT (OUTPATIENT)
Dept: OUTPATIENT SERVICES | Facility: HOSPITAL | Age: 40
LOS: 1 days | Discharge: HOME | End: 2020-06-12

## 2020-06-12 DIAGNOSIS — D68.2 HEREDITARY DEFICIENCY OF OTHER CLOTTING FACTORS: ICD-10-CM

## 2020-06-12 DIAGNOSIS — Z98.84 BARIATRIC SURGERY STATUS: Chronic | ICD-10-CM

## 2020-06-15 ENCOUNTER — RESULT REVIEW (OUTPATIENT)
Age: 40
End: 2020-06-15

## 2020-06-15 ENCOUNTER — APPOINTMENT (OUTPATIENT)
Dept: OBGYN | Facility: CLINIC | Age: 40
End: 2020-06-15
Payer: COMMERCIAL

## 2020-06-15 PROCEDURE — 99385 PREV VISIT NEW AGE 18-39: CPT

## 2020-06-17 ENCOUNTER — APPOINTMENT (OUTPATIENT)
Dept: HEMATOLOGY ONCOLOGY | Facility: CLINIC | Age: 40
End: 2020-06-17
Payer: COMMERCIAL

## 2020-06-17 ENCOUNTER — APPOINTMENT (OUTPATIENT)
Dept: ULTRASOUND IMAGING | Facility: CLINIC | Age: 40
End: 2020-06-17
Payer: COMMERCIAL

## 2020-06-17 ENCOUNTER — OUTPATIENT (OUTPATIENT)
Dept: OUTPATIENT SERVICES | Facility: HOSPITAL | Age: 40
LOS: 1 days | End: 2020-06-17
Payer: COMMERCIAL

## 2020-06-17 ENCOUNTER — RESULT REVIEW (OUTPATIENT)
Age: 40
End: 2020-06-17

## 2020-06-17 DIAGNOSIS — N97.9 FEMALE INFERTILITY, UNSPECIFIED: ICD-10-CM

## 2020-06-17 DIAGNOSIS — Z00.8 ENCOUNTER FOR OTHER GENERAL EXAMINATION: ICD-10-CM

## 2020-06-17 DIAGNOSIS — Z98.84 BARIATRIC SURGERY STATUS: Chronic | ICD-10-CM

## 2020-06-17 PROCEDURE — 76700 US EXAM ABDOM COMPLETE: CPT | Mod: 26

## 2020-06-17 PROCEDURE — 76856 US EXAM PELVIC COMPLETE: CPT | Mod: 26

## 2020-06-17 PROCEDURE — 76830 TRANSVAGINAL US NON-OB: CPT | Mod: 26

## 2020-06-17 PROCEDURE — 76830 TRANSVAGINAL US NON-OB: CPT

## 2020-06-17 PROCEDURE — 99214 OFFICE O/P EST MOD 30 MIN: CPT | Mod: 95

## 2020-06-17 PROCEDURE — 76856 US EXAM PELVIC COMPLETE: CPT

## 2020-06-17 PROCEDURE — 76700 US EXAM ABDOM COMPLETE: CPT

## 2020-06-17 RX ORDER — NALTREXONE HYDROCHLORIDE AND BUPROPION HYDROCHLORIDE 8; 90 MG/1; MG/1
8-90 TABLET, EXTENDED RELEASE ORAL
Qty: 60 | Refills: 3 | Status: DISCONTINUED | COMMUNITY
Start: 2018-11-13 | End: 2020-06-17

## 2020-06-17 RX ORDER — CHLORHEXIDINE GLUCONATE 4 %
325 (65 FE) LIQUID (ML) TOPICAL DAILY
Qty: 90 | Refills: 3 | Status: DISCONTINUED | COMMUNITY
Start: 2019-12-27 | End: 2020-06-17

## 2020-06-17 RX ORDER — ALBUTEROL SULFATE 2.5 MG/3ML
(2.5 MG/3ML) SOLUTION RESPIRATORY (INHALATION)
Qty: 1 | Refills: 1 | Status: DISCONTINUED | COMMUNITY
Start: 2018-11-13 | End: 2020-06-17

## 2020-06-17 NOTE — ASSESSMENT
[FreeTextEntry1] : This is a 39 year old woman with a history of extensive bilateral unprovoked PE in October 30th 2019.  Patient had a hypercoagulable workup at the time, anticardiolipin antibodies were negative, Protein C and S normal, no evidence of a hypercoagulable state.  Patient now presents for re-evaluation in the setting of an upcoming IVF procedure.  The hypercoagulable workup was already negative, patient ptient aleady completed.  the course of 1 year of Eliquis therapy for the PE.  Did not make criteria for antiphospholipid syndrome. This should not have any effect on fetal loss. However the state of pregnancy is a hypercoagulable state by itself. It is difficult to determine if patient would benefit from VTE prophylaxis or treatment during the course of the planned pregnancy.  Given its been almost 2 years since the initial evaluation would perform an extended workup for hypercoagulable states. Check APCR, ATIII, DRVVT, Factor VIII, Protein C and S activity and homocysteine now while patient not yet pregnant.  \par \par On a side note, patient has an extensive family history of malignancies including multiple colon cancers. Recommend patient see a genetics  for evaluation, possible gonzales syndrome.

## 2020-06-17 NOTE — REVIEW OF SYSTEMS
[Negative] : Cardiovascular [FreeTextEntry2] : mild weakness and shortness of breath.   [FreeTextEntry6] : mild dyspnea.

## 2020-06-17 NOTE — HISTORY OF PRESENT ILLNESS
[de-identified] : This is a 39 year old woman who presents for the evaluation of a history of venos thromboembolism in the setting of an ongoing workup for in vitro fertilization. Paitnet had a history of a massive unprovoked  bilateral PE, all 5 lobes affected.  PE took place on October 30th, discovered on CT angiogram.  Hypercoagulable owrkup negative. Patient was teated with a year o eliquis.  Since then she has had norecurrent VTE events.   [de-identified] : 39 year old woman with a history of a extensive unprovoked bilateral PE 2018.  She presents for hematologic reevaluation in the setting of an upcoming IVF procedure with Dr. Dana Bryan planned for late August.  Since the time after the initial b/l PE, patient has not had any recurrent symptoms. Still feels some mild shortness of breath and dyspnea as her baseline. She had seen Dr. Jurado for pulmonary evaluation. She was going to have an echocardiogram and possibly a VQ scan to look for residual disease but this has not yet taken place.  \par Also has an appointment with Dr. Radha Swift upcoming [facility] appointment.  \par \[facility] Also concerned about hereditary cancer syndromes. Mother side had extensive cancer history Maternal grandfather her  of colon cancer, and Maternal uncle  colon cancer and liver cancer\par Father had colon cancer 5 years ago. Also colon liver and uncle \par Leukemia on maternal cousin\par Grandmother's mother myeloma.  \par Mothers uncle had esophageal cancer.  \par \par

## 2020-06-20 ENCOUNTER — LABORATORY RESULT (OUTPATIENT)
Age: 40
End: 2020-06-20

## 2020-06-23 ENCOUNTER — LABORATORY RESULT (OUTPATIENT)
Age: 40
End: 2020-06-23

## 2020-06-23 ENCOUNTER — APPOINTMENT (OUTPATIENT)
Dept: CARDIOLOGY | Facility: CLINIC | Age: 40
End: 2020-06-23
Payer: COMMERCIAL

## 2020-06-23 DIAGNOSIS — R06.00 DYSPNEA, UNSPECIFIED: ICD-10-CM

## 2020-06-23 PROCEDURE — 93306 TTE W/DOPPLER COMPLETE: CPT

## 2020-06-23 PROCEDURE — 93224 XTRNL ECG REC UP TO 48 HRS: CPT

## 2020-06-24 ENCOUNTER — APPOINTMENT (OUTPATIENT)
Dept: CARDIOLOGY | Facility: CLINIC | Age: 40
End: 2020-06-24
Payer: COMMERCIAL

## 2020-06-24 ENCOUNTER — NON-APPOINTMENT (OUTPATIENT)
Age: 40
End: 2020-06-24

## 2020-06-24 VITALS
HEIGHT: 63 IN | WEIGHT: 293 LBS | RESPIRATION RATE: 15 BRPM | HEART RATE: 84 BPM | DIASTOLIC BLOOD PRESSURE: 89 MMHG | BODY MASS INDEX: 51.91 KG/M2 | SYSTOLIC BLOOD PRESSURE: 136 MMHG

## 2020-06-24 DIAGNOSIS — Z01.810 ENCOUNTER FOR PREPROCEDURAL CARDIOVASCULAR EXAMINATION: ICD-10-CM

## 2020-06-24 PROCEDURE — 99204 OFFICE O/P NEW MOD 45 MIN: CPT

## 2020-06-24 PROCEDURE — 93000 ELECTROCARDIOGRAM COMPLETE: CPT

## 2020-06-24 NOTE — PHYSICAL EXAM
[General Appearance - Well Developed] : well developed [Normal Appearance] : normal appearance [Well Groomed] : well groomed [General Appearance - Well Nourished] : well nourished [No Deformities] : no deformities [Normal Conjunctiva] : the conjunctiva exhibited no abnormalities [General Appearance - In No Acute Distress] : no acute distress [Eyelids - No Xanthelasma] : the eyelids demonstrated no xanthelasmas [Normal Oral Mucosa] : normal oral mucosa [Normal Jugular Venous A Waves Present] : normal jugular venous A waves present [Normal Jugular Venous V Waves Present] : normal jugular venous V waves present [No Jugular Venous Singleton A Waves] : no jugular venous singleton A waves [5th Left ICS - MCL] : palpated at the 5th LICS in the midclavicular line [Normal] : normal [No Precordial Heave] : no precordial heave was noted [Normal Rate] : normal [Rhythm Regular] : regular [Normal S2] : normal S2 [Normal S1] : normal S1 [2+] : right 2+ [No Abnormalities] : the abdominal aorta was not enlarged and no bruit was heard [No Pitting Edema] : no pitting edema present [Auscultation Breath Sounds / Voice Sounds] : lungs were clear to auscultation bilaterally [Respiration, Rhythm And Depth] : normal respiratory rhythm and effort [] : no respiratory distress [Exaggerated Use Of Accessory Muscles For Inspiration] : no accessory muscle use [Abdomen Soft] : soft [Gait - Sufficient For Exercise Testing] : the gait was sufficient for exercise testing [Abnormal Walk] : normal gait [Cyanosis, Localized] : no localized cyanosis [Skin Color & Pigmentation] : normal skin color and pigmentation [No Xanthoma] : no  xanthoma was observed [Oriented To Time, Place, And Person] : oriented to person, place, and time [Skin Turgor] : normal skin turgor [Mood] : the mood was normal [No Anxiety] : not feeling anxious [Affect] : the affect was normal [No Gallop] : no gallop heard [Click] : a ~M click was heard [S4] : no S4 [S3] : no S3 [Pericardial Rub] : no pericardial rub [Distant] : the heart sounds were ~L not distant [I] : a grade 1 [Left Carotid Bruit] : no bruit heard over the left carotid [Right Carotid Bruit] : no bruit heard over the right carotid

## 2020-06-24 NOTE — DISCUSSION/SUMMARY
[FreeTextEntry1] : This is 39-year-old female past medical history significant for asthma, dyspepsia, status post gastric sleeve in 2012, status post repair of a diaphragmatic hernia, status post multiple pulmonary emboli October 2018, who comes in for cardiac preoperative consultation.  The patient is scheduled for bariatric surgery with gastric sleeve revision.\par She denies chest pain, shortness of breath, dizziness or syncope.  She does complain of occasional dyspnea on exertion, and occasional palpitations.  She does not drink excessive caffeine or alcohol.  She has no history of rheumatic fever.  She is allergic to sulfa (hives, airway compromise, and rash).\par The patient presented to [facility] October 2018 with progressive shortness of breath.  She had a CTA done which demonstrated bilateral pulmonary emboli (she was told that she had 5), and was placed on anticoagulation.  She was seen by hematologist and her evaluation was reportedly negative.  She had been placed on Eliquis 5 mg twice a day.\par The patient's blood pressure is in the upper limits of normal today.  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  She also had a Holter monitor done to rule out significant arrhythmia or heart block.\par She will schedule an exercise stress test to rule out significant coronary artery disease and to evaluate her symptoms.  She will follow-up with me after above-noted diagnostic tests are completed.\par Lifestyle modification including salt restriction and regular aerobic exercise were reinforced.\par The patient understands that aerobic exercises must be increased to 40 minutes 4 times per week. A detailed discussion of lifestyle modification was done today. The patient has a good understanding of the diagnosis, and treatment plan. Lifestyle modification was also outlined.

## 2020-06-24 NOTE — REASON FOR VISIT
[Consultation] : a consultation regarding [Palpitations] : palpitations [Dyspnea] : dyspnea [FreeTextEntry2] : pre-op cardiac clearance

## 2020-06-29 ENCOUNTER — NON-APPOINTMENT (OUTPATIENT)
Age: 40
End: 2020-06-29

## 2020-07-07 ENCOUNTER — OUTPATIENT (OUTPATIENT)
Dept: OUTPATIENT SERVICES | Facility: HOSPITAL | Age: 40
LOS: 1 days | End: 2020-07-07
Payer: COMMERCIAL

## 2020-07-07 DIAGNOSIS — Z11.59 ENCOUNTER FOR SCREENING FOR OTHER VIRAL DISEASES: ICD-10-CM

## 2020-07-07 DIAGNOSIS — Z98.84 BARIATRIC SURGERY STATUS: Chronic | ICD-10-CM

## 2020-07-07 PROCEDURE — U0003: CPT

## 2020-07-08 ENCOUNTER — APPOINTMENT (OUTPATIENT)
Dept: PULMONOLOGY | Facility: CLINIC | Age: 40
End: 2020-07-08
Payer: COMMERCIAL

## 2020-07-08 ENCOUNTER — APPOINTMENT (OUTPATIENT)
Dept: OBGYN | Facility: CLINIC | Age: 40
End: 2020-07-08
Payer: COMMERCIAL

## 2020-07-08 ENCOUNTER — OUTPATIENT (OUTPATIENT)
Dept: OUTPATIENT SERVICES | Facility: HOSPITAL | Age: 40
LOS: 1 days | End: 2020-07-08
Payer: COMMERCIAL

## 2020-07-08 ENCOUNTER — APPOINTMENT (OUTPATIENT)
Dept: CARDIOLOGY | Facility: CLINIC | Age: 40
End: 2020-07-08
Payer: COMMERCIAL

## 2020-07-08 ENCOUNTER — APPOINTMENT (OUTPATIENT)
Dept: MAMMOGRAPHY | Facility: IMAGING CENTER | Age: 40
End: 2020-07-08
Payer: COMMERCIAL

## 2020-07-08 ENCOUNTER — RESULT REVIEW (OUTPATIENT)
Age: 40
End: 2020-07-08

## 2020-07-08 VITALS
WEIGHT: 293 LBS | HEART RATE: 90 BPM | DIASTOLIC BLOOD PRESSURE: 82 MMHG | BODY MASS INDEX: 51.91 KG/M2 | SYSTOLIC BLOOD PRESSURE: 130 MMHG | HEIGHT: 63 IN | RESPIRATION RATE: 15 BRPM

## 2020-07-08 VITALS
DIASTOLIC BLOOD PRESSURE: 80 MMHG | BODY MASS INDEX: 54.61 KG/M2 | SYSTOLIC BLOOD PRESSURE: 124 MMHG | HEART RATE: 82 BPM | HEIGHT: 61.5 IN | OXYGEN SATURATION: 98 % | TEMPERATURE: 98 F | RESPIRATION RATE: 16 BRPM | WEIGHT: 293 LBS

## 2020-07-08 DIAGNOSIS — R06.83 SNORING: ICD-10-CM

## 2020-07-08 DIAGNOSIS — Z98.84 BARIATRIC SURGERY STATUS: Chronic | ICD-10-CM

## 2020-07-08 DIAGNOSIS — Z11.59 ENCOUNTER FOR SCREENING FOR OTHER VIRAL DISEASES: ICD-10-CM

## 2020-07-08 DIAGNOSIS — Z00.8 ENCOUNTER FOR OTHER GENERAL EXAMINATION: ICD-10-CM

## 2020-07-08 LAB — SARS-COV-2 RNA SPEC QL NAA+PROBE: SIGNIFICANT CHANGE UP

## 2020-07-08 PROCEDURE — 94727 GAS DIL/WSHOT DETER LNG VOL: CPT

## 2020-07-08 PROCEDURE — 77063 BREAST TOMOSYNTHESIS BI: CPT

## 2020-07-08 PROCEDURE — 99214 OFFICE O/P EST MOD 30 MIN: CPT | Mod: 25

## 2020-07-08 PROCEDURE — 94010 BREATHING CAPACITY TEST: CPT

## 2020-07-08 PROCEDURE — 99213 OFFICE O/P EST LOW 20 MIN: CPT | Mod: 25

## 2020-07-08 PROCEDURE — 94729 DIFFUSING CAPACITY: CPT

## 2020-07-08 PROCEDURE — 77063 BREAST TOMOSYNTHESIS BI: CPT | Mod: 26

## 2020-07-08 PROCEDURE — 93015 CV STRESS TEST SUPVJ I&R: CPT

## 2020-07-08 PROCEDURE — 99214 OFFICE O/P EST MOD 30 MIN: CPT

## 2020-07-08 PROCEDURE — ZZZZZ: CPT

## 2020-07-08 PROCEDURE — 77067 SCR MAMMO BI INCL CAD: CPT | Mod: 26

## 2020-07-08 PROCEDURE — 77067 SCR MAMMO BI INCL CAD: CPT

## 2020-07-08 RX ORDER — CHLORHEXIDINE GLUCONATE 4 %
325 (65 FE) LIQUID (ML) TOPICAL DAILY
Qty: 30 | Refills: 1 | Status: DISCONTINUED | COMMUNITY
Start: 2020-06-17 | End: 2020-07-08

## 2020-07-08 NOTE — ASSESSMENT
[FreeTextEntry1] : 38 year old female Hx bilateral pulmonary emboli 11/2018 presents for evaluation, Hx asthma\par \par \par Continue Breo-Ellipta 100-25 daily.\par Rescue inhaler as directed\par Recommend Sleep Study\par Follow up after Sleep Study\par \par Pulmonary optimization after Sleep evaluation

## 2020-07-08 NOTE — PROCEDURE
[FreeTextEntry1] : PFT 7/22/2019 personally reviewed mild obstructive ventilatory defect without gas exchange abnormality and mild bronchodilator response.\par \par PFT 7/8/20 personally reviewed Normal PFT

## 2020-07-08 NOTE — HISTORY OF PRESENT ILLNESS
[TextBox_4] : Patient is a 38 year old female Hx bilateral pulmonary emboli 11/2018, unprovoked, now off Eliquis, Hx asthma,  presents to [facility] for evaluation.  Patient anticipating bariatric surgery.  Intervention was delayed because of COVID.  She is now ready to begin clearance for anticipated surgery.  Patient denies respiratory complaints cough, shortness of breath, chest pain. She does admit to snoring.  She is here for pre surgical pulmonary evaluation

## 2020-07-08 NOTE — REVIEW OF SYSTEMS
[Recent Wt Gain (___ Lbs)] : recent [unfilled] ~Ulb weight gain [Fatigue] : fatigue [Reflux] : reflux [Pulmonary Embolism] : pulmonary embolism [As Noted in HPI] : as noted in HPI [Negative] : Sleep Disorder

## 2020-07-08 NOTE — PHYSICAL EXAM
[General Appearance - In No Acute Distress] : no acute distress [General Appearance - Well Developed] : well developed [General Appearance - Well Nourished] : well nourished [Erythema] : erythema of the pharynx [Normal Conjunctiva] : the conjunctiva exhibited no abnormalities [] : the neck was supple [Heart Sounds] : normal S1 and S2 [Jugular Venous Distention Increased] : there was no jugular-venous distention [Respiration, Rhythm And Depth] : normal respiratory rhythm and effort [Auscultation Breath Sounds / Voice Sounds] : lungs were clear to auscultation bilaterally [Cyanosis, Localized] : no localized cyanosis [Abdomen Soft] : soft [Nail Clubbing] : no clubbing of the fingernails [Cranial Nerves] : cranial nerves 2-12 were intact [No Focal Deficits] : no focal deficits [Non-Pitting] : non-pitting [Oriented To Time, Place, And Person] : oriented to person, place, and time [FreeTextEntry1] : Morbidly obese

## 2020-07-10 ENCOUNTER — RESULT REVIEW (OUTPATIENT)
Age: 40
End: 2020-07-10

## 2020-07-10 ENCOUNTER — OUTPATIENT (OUTPATIENT)
Dept: OUTPATIENT SERVICES | Facility: HOSPITAL | Age: 40
LOS: 1 days | Discharge: HOME | End: 2020-07-10
Payer: COMMERCIAL

## 2020-07-10 VITALS
HEART RATE: 83 BPM | SYSTOLIC BLOOD PRESSURE: 123 MMHG | RESPIRATION RATE: 22 BRPM | TEMPERATURE: 97 F | DIASTOLIC BLOOD PRESSURE: 83 MMHG | OXYGEN SATURATION: 100 % | WEIGHT: 293 LBS | HEIGHT: 62 IN

## 2020-07-10 DIAGNOSIS — Z98.84 BARIATRIC SURGERY STATUS: Chronic | ICD-10-CM

## 2020-07-10 DIAGNOSIS — E66.01 MORBID (SEVERE) OBESITY DUE TO EXCESS CALORIES: ICD-10-CM

## 2020-07-10 DIAGNOSIS — K21.9 GASTRO-ESOPHAGEAL REFLUX DISEASE WITHOUT ESOPHAGITIS: ICD-10-CM

## 2020-07-10 LAB — HCG UR QL: NEGATIVE — SIGNIFICANT CHANGE UP

## 2020-07-10 PROCEDURE — 88305 TISSUE EXAM BY PATHOLOGIST: CPT

## 2020-07-10 PROCEDURE — 81025 URINE PREGNANCY TEST: CPT

## 2020-07-10 PROCEDURE — 88313 SPECIAL STAINS GROUP 2: CPT | Mod: 26

## 2020-07-10 PROCEDURE — 88313 SPECIAL STAINS GROUP 2: CPT

## 2020-07-10 PROCEDURE — 88305 TISSUE EXAM BY PATHOLOGIST: CPT | Mod: 26

## 2020-07-10 RX ORDER — NALTREXONE HYDROCHLORIDE AND BUPROPION HYDROCHLORIDE 8; 90 MG/1; MG/1
2 TABLET, EXTENDED RELEASE ORAL
Qty: 0 | Refills: 0 | DISCHARGE

## 2020-07-16 ENCOUNTER — OUTPATIENT (OUTPATIENT)
Dept: OUTPATIENT SERVICES | Facility: HOSPITAL | Age: 40
LOS: 1 days | Discharge: HOME | End: 2020-07-16

## 2020-07-16 DIAGNOSIS — R13.10 DYSPHAGIA, UNSPECIFIED: ICD-10-CM

## 2020-07-16 DIAGNOSIS — Z86.711 PERSONAL HISTORY OF PULMONARY EMBOLISM: ICD-10-CM

## 2020-07-16 DIAGNOSIS — Z88.2 ALLERGY STATUS TO SULFONAMIDES: ICD-10-CM

## 2020-07-16 DIAGNOSIS — Z98.84 BARIATRIC SURGERY STATUS: Chronic | ICD-10-CM

## 2020-07-16 DIAGNOSIS — J45.909 UNSPECIFIED ASTHMA, UNCOMPLICATED: ICD-10-CM

## 2020-07-16 DIAGNOSIS — Z91.02 FOOD ADDITIVES ALLERGY STATUS: ICD-10-CM

## 2020-07-16 DIAGNOSIS — Z88.8 ALLERGY STATUS TO OTHER DRUGS, MEDICAMENTS AND BIOLOGICAL SUBSTANCES: ICD-10-CM

## 2020-07-16 DIAGNOSIS — E66.01 MORBID (SEVERE) OBESITY DUE TO EXCESS CALORIES: ICD-10-CM

## 2020-07-16 DIAGNOSIS — Z87.892 PERSONAL HISTORY OF ANAPHYLAXIS: ICD-10-CM

## 2020-07-16 DIAGNOSIS — Z98.84 BARIATRIC SURGERY STATUS: ICD-10-CM

## 2020-07-16 DIAGNOSIS — K29.70 GASTRITIS, UNSPECIFIED, WITHOUT BLEEDING: ICD-10-CM

## 2020-07-16 DIAGNOSIS — K21.0 GASTRO-ESOPHAGEAL REFLUX DISEASE WITH ESOPHAGITIS: ICD-10-CM

## 2020-07-16 DIAGNOSIS — K31.89 OTHER DISEASES OF STOMACH AND DUODENUM: ICD-10-CM

## 2020-07-16 DIAGNOSIS — D68.2 HEREDITARY DEFICIENCY OF OTHER CLOTTING FACTORS: ICD-10-CM

## 2020-07-21 ENCOUNTER — APPOINTMENT (OUTPATIENT)
Dept: HEMATOLOGY ONCOLOGY | Facility: CLINIC | Age: 40
End: 2020-07-21

## 2020-07-24 ENCOUNTER — APPOINTMENT (OUTPATIENT)
Dept: HUMAN REPRODUCTION | Facility: CLINIC | Age: 40
End: 2020-07-24
Payer: COMMERCIAL

## 2020-07-24 PROCEDURE — 99214 OFFICE O/P EST MOD 30 MIN: CPT | Mod: 95

## 2020-07-25 PROBLEM — R06.00 DYSPNEA ON EXERTION: Status: ACTIVE | Noted: 2020-06-24

## 2020-08-03 ENCOUNTER — RESULT REVIEW (OUTPATIENT)
Age: 40
End: 2020-08-03

## 2020-08-03 ENCOUNTER — OUTPATIENT (OUTPATIENT)
Dept: OUTPATIENT SERVICES | Facility: HOSPITAL | Age: 40
LOS: 1 days | End: 2020-08-03

## 2020-08-03 ENCOUNTER — APPOINTMENT (OUTPATIENT)
Dept: INTERVENTIONAL RADIOLOGY/VASCULAR | Facility: CLINIC | Age: 40
End: 2020-08-03
Payer: COMMERCIAL

## 2020-08-03 DIAGNOSIS — Z98.84 BARIATRIC SURGERY STATUS: Chronic | ICD-10-CM

## 2020-08-03 DIAGNOSIS — N97.1 FEMALE INFERTILITY OF TUBAL ORIGIN: ICD-10-CM

## 2020-08-03 PROCEDURE — 58340 CATHETER FOR HYSTEROGRAPHY: CPT

## 2020-08-03 PROCEDURE — 74740 X-RAY FEMALE GENITAL TRACT: CPT | Mod: 26

## 2020-08-04 ENCOUNTER — APPOINTMENT (OUTPATIENT)
Dept: HUMAN REPRODUCTION | Facility: CLINIC | Age: 40
End: 2020-08-04
Payer: COMMERCIAL

## 2020-08-04 PROCEDURE — 99213 OFFICE O/P EST LOW 20 MIN: CPT | Mod: 25

## 2020-08-04 PROCEDURE — 36415 COLL VENOUS BLD VENIPUNCTURE: CPT

## 2020-08-04 PROCEDURE — 76830 TRANSVAGINAL US NON-OB: CPT

## 2020-09-18 ENCOUNTER — EMERGENCY (EMERGENCY)
Facility: HOSPITAL | Age: 40
LOS: 1 days | Discharge: HOME | End: 2020-09-18
Attending: EMERGENCY MEDICINE
Payer: COMMERCIAL

## 2020-09-18 VITALS
HEART RATE: 18 BPM | HEIGHT: 62 IN | WEIGHT: 279.99 LBS | OXYGEN SATURATION: 99 % | DIASTOLIC BLOOD PRESSURE: 84 MMHG | RESPIRATION RATE: 18 BRPM | SYSTOLIC BLOOD PRESSURE: 119 MMHG | TEMPERATURE: 98 F

## 2020-09-18 VITALS
SYSTOLIC BLOOD PRESSURE: 134 MMHG | OXYGEN SATURATION: 100 % | DIASTOLIC BLOOD PRESSURE: 84 MMHG | HEART RATE: 73 BPM | RESPIRATION RATE: 15 BRPM

## 2020-09-18 DIAGNOSIS — Z98.84 BARIATRIC SURGERY STATUS: Chronic | ICD-10-CM

## 2020-09-18 PROCEDURE — 76377 3D RENDER W/INTRP POSTPROCES: CPT

## 2020-09-18 PROCEDURE — 76377 3D RENDER W/INTRP POSTPROCES: CPT | Mod: 26

## 2020-09-18 PROCEDURE — 99284 EMERGENCY DEPT VISIT MOD MDM: CPT | Mod: 25

## 2020-09-18 PROCEDURE — 70450 CT HEAD/BRAIN W/O DYE: CPT | Mod: 26

## 2020-09-18 PROCEDURE — 70486 CT MAXILLOFACIAL W/O DYE: CPT | Mod: 26

## 2020-09-18 PROCEDURE — 72125 CT NECK SPINE W/O DYE: CPT

## 2020-09-18 PROCEDURE — 70450 CT HEAD/BRAIN W/O DYE: CPT

## 2020-09-18 PROCEDURE — 96374 THER/PROPH/DIAG INJ IV PUSH: CPT | Mod: XU

## 2020-09-18 PROCEDURE — 99285 EMERGENCY DEPT VISIT HI MDM: CPT

## 2020-09-18 PROCEDURE — 72125 CT NECK SPINE W/O DYE: CPT | Mod: 26

## 2020-09-18 PROCEDURE — 70486 CT MAXILLOFACIAL W/O DYE: CPT

## 2020-09-18 RX ORDER — ONDANSETRON 8 MG/1
4 TABLET, FILM COATED ORAL ONCE
Refills: 0 | Status: COMPLETED | OUTPATIENT
Start: 2020-09-18 | End: 2020-09-18

## 2020-09-18 RX ORDER — ACETAMINOPHEN 500 MG
650 TABLET ORAL ONCE
Refills: 0 | Status: COMPLETED | OUTPATIENT
Start: 2020-09-18 | End: 2020-09-18

## 2020-09-18 RX ADMIN — Medication 650 MILLIGRAM(S): at 15:56

## 2020-09-18 RX ADMIN — ONDANSETRON 4 MILLIGRAM(S): 8 TABLET, FILM COATED ORAL at 15:55

## 2020-09-18 NOTE — ED PROVIDER NOTE - CHPI ED SYMPTOMS NEG
no chest pain/no change in level of consciousness/no vomiting/no chest wall tenderness/no back pain/no weakness/no loss of consciousness/no seizure

## 2020-09-18 NOTE — ED PROVIDER NOTE - ATTENDING CONTRIBUTION TO CARE
**ATTENDING ADDENDUM (Dr. Can Harry): I attest that I have directly examined this patient and reviewed and formulated the diagnostic and therapeutic management plan in collaboration with the advanced practitioner (NP, PA). Please see MDM note and remainder of EMR for findings from CC, HPI, ROS, and PE. (Bignami)

## 2020-09-18 NOTE — ED PROVIDER NOTE - NS ED ROS FT
**ATTENDING ADDENDUM (Dr. Can Harry): During my interview with the patient, I have personally obtained and/or have directly verified the elements in the past medical/surgical history and other histories as noted earlier in the EMR, in conjunction with the other members (EM resident/PA/NP) of the patient care team. I have also personally obtained and/or have directly verified/reviewed the review of systems as documented below, in conjunction with the other members (EM resident/PA/NP) of the patient care team.
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
## 2020-09-18 NOTE — ED PROVIDER NOTE - ENMT, MLM
NCAT Maxillary and lower orbit tenderness. Airway patent, Nasal mucosa clear. Mouth with normal mucosa. Throat has no vesicles, no oropharyngeal exudates and uvula is midline. NCAT Maxillary and lower orbit tenderness. Airway patent, Nasal mucosa clear. Mouth with normal mucosa. Throat has no vesicles, no oropharyngeal exudates and uvula is midline. **ATTENDING ADDENDUM (Dr. Can Harry): AIRWAY CLEAR. NO pooling of secretions, POSITIVE gag reflex, NO debris, ABLE TO SELF-PROTECT AIRWAY. POSITIVE full range of motion of the mandible. NO temporomandibular joint tenderness with range of motion or palpation. NO dental trauma. NO malocclusion. NO facial or nasal deformity or bony tenderness. NO epistaxis or septal hematoma noted.

## 2020-09-18 NOTE — ED PROVIDER NOTE - NSFOLLOWUPINSTRUCTIONS_ED_ALL_ED_FT
rest and hydration. Tylenol or motrin over the counter as needed for pain every 6 hours. Follow up with your doctor or our sports clinic in 1-2 days. Do not go back to work until cleared. Return to the ER immediately for worsening symptoms

## 2020-09-18 NOTE — ED PROVIDER NOTE - GASTROINTESTINAL, MLM
Abdomen soft, non-tender, no guarding. Abdomen soft, non-tender **ATTENDING ADDENDUM (Dr. Can Harry): NO guarding, rebound, or rigidity. NO pulsatile or non-pulsatile masses. NO hernias. NO obvious hepatosplenomegaly.

## 2020-09-18 NOTE — ED PROVIDER NOTE - MUSCULOSKELETAL NECK EXAM
MIdline C spine tenderness around c3-5 with slight paravertebral muscle tenderness. Patient placed in collar no other bony tenderness noted. VERTEBRAL POINT TENDERNESS/MIdline C spine tenderness around c3-5 with slight paravertebral muscle tenderness. Patient placed in collar no other bony tenderness noted./supple/trachea midline

## 2020-09-18 NOTE — ED PROVIDER NOTE - ADDITIONAL NOTES AND INSTRUCTIONS:
Patient may not return to work until cleared by primary care doctor. Can return earlier if cleared by PCP

## 2020-09-18 NOTE — ED PROVIDER NOTE - NSFOLLOWUPCLINICS_GEN_ALL_ED_FT
[facility]  Sports Medicine  [address] [place] [address]  Phone: [phone]  Fax:   Follow Up Time: 1-3 Days
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
## 2020-09-18 NOTE — ED PROVIDER NOTE - PHYSICAL EXAMINATION
**ATTENDING ADDENDUM (Dr. Can Harry): I have reviewed and substantially contributed to the elements of the PE as documented above. I have directly performed an examination of this patient in conjunction with the other members (EM resident/PA/NP) of the patient care team. I have personally reviewed the patient's vital signs at the time of the patient's initial presentation to the ED and repeatedly throughout the ED course. **ATTENDING ADDENDUM (Dr. Can Harry): I have reviewed and substantially contributed to the elements of the PE as documented above. I have directly performed an examination of this patient in conjunction with the other members (EM resident/PA/NP) of the patient care team. I have personally reviewed the patient's vital signs at the time of the patient's initial presentation to the ED and repeatedly throughout the ED course. POSITIVE cervical collar in place at time of attending evaluation.
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
## 2020-09-18 NOTE — ED PROVIDER NOTE - SKIN, MLM
Skin normal color for race, warm, dry and intact. No evidence of rash. Skin normal color for race, warm, dry and intact. No evidence of rash. **ATTENDING ADDENDUM (Dr. Can Harry): NO rashes, lesions, ulcers, vesicles, erythema, streaking, lymphangitic spread, crepitus, cellulitis, petechiae, purpurae, track marks or ecchymoses.

## 2020-09-18 NOTE — ED PROVIDER NOTE - EYES, MLM
Clear bilaterally, pupils equal, round and reactive to light. EOM intact with slight discomfort in left eye with medial gaze. Clear bilaterally, pupils equal, round and reactive to light. EOM intact with slight discomfort in left eye with medial gaze. **ATTENDING ADDENDUM (Dr. Can Harry): Extraocular muscle movements intact. Clear corneas bilaterally, pupils equal and round. NO nystagmus.

## 2020-09-18 NOTE — ED PROVIDER NOTE - CRANIAL NERVE AND PUPILLARY EXAM
cranial nerves 2-12 intact extra-ocular movements intact/cranial nerves 2-12 intact/central vision intact/gag reflex intact/tongue is midline

## 2020-09-18 NOTE — ED PROVIDER NOTE - RESPIRATORY NEGATIVE STATEMENT, MLM
no chest pain, no cough, and no shortness of breath. no chest pain, no cough, and no shortness of breath. **ATTENDING ADDENDUM (Dr. Can Harry): POSITIVE history of asthma and pulmonary embolism
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
## 2020-09-18 NOTE — ED PROVIDER NOTE - RESPIRATORY, MLM
Breath sounds clear and equal bilaterally. NO chest tenderness Breath sounds clear and equal bilaterally. NO chest tenderness **ATTENDING ADDENDUM (Dr. Can Harry): BREATHING CLEAR. POSITIVE BILATERAL BREATH SOUNDS auscultated. NO stridor, drooling, tripoding, or wheezing. POSITIVE bilateral chest wall expansion WITHOUT crepitus, tenderness, or deformity. POSITIVE midline trachea.

## 2020-09-18 NOTE — ED PROVIDER NOTE - PROGRESS NOTE DETAILS
**ATTENDING ADDENDUM (Dr. Can Harry): patient serially evaluated throughout ED course. NO acute deterioration up to this time in the ED. Awaiting completion of ED workup. Will continue to observe and monitor closely. Anticipatory guidance provided. Discussed results with patient. Will follow up with primary doctor and get cleared before returning to work. Will take motrin or tylenol for pain. Discussed results with patient. Will follow up with primary doctor and get cleared before returning to work. Will take motrin or tylenol for pain.  **ATTENDING ADDENDUM (Dr. Can Harry): patient serially evaluated throughout ED course. NO acute deterioration up to this time in the ED. ED diagnostics up to this time acknowledged, reviewed and noted. Agree with discharge home with close outpatient followup with primary care physician/provider and with [employer] as required for work. Extensive anticipatory guidance provided to patient +/or family member(s) by ED team members throughout ED course. Patient appears STABLE for discharge at this time. NO evidence of cord injury, spine fracture, or intracerebral hemorrhage at this time. Probable concussion with sprain/strain/contusions.

## 2020-09-18 NOTE — ED PROVIDER NOTE - CARE PLAN
Goal:	**ATTENDING ADDENDUM (Dr. Can Harry): Goals of care include resolution of emergent/urgent symptoms and concerns, and restoration to baseline level of homeostasis.   Principal Discharge DX:	Injury of head, initial encounter  Goal:	**ATTENDING ADDENDUM (Dr. Can Harry): Goals of care include resolution of emergent/urgent symptoms and concerns, and restoration to baseline level of homeostasis.  Secondary Diagnosis:	Neck strain, initial encounter

## 2020-09-18 NOTE — ED PROVIDER NOTE - PATIENT PORTAL LINK FT
You can access the FollowMyHealth Patient Portal offered by [facility] by registering at the following website: [email or web address]. By joining [facility]’s FollowMyHealth portal, you will also be able to view your health information using other applications (apps) compatible with our system.

## 2020-09-18 NOTE — ED PROVIDER NOTE - DIAGNOSTIC INTERPRETATION
**ATTENDING ADDENDUM (Dr. Can Harry): Radiographs reviewed and analyzed. Pertinent findings include: NO evidence of intracerebral hemorrhage or skull fracture. NO evidence of cervical spine or cord injury, but POSITIVE reversal of lordosis likely secondary to spasm. NO facial bone fractures.

## 2020-09-18 NOTE — ED PROVIDER NOTE - PLAN OF CARE
**ATTENDING ADDENDUM (Dr. Can Harry): Goals of care include resolution of emergent/urgent symptoms and concerns, and restoration to baseline level of homeostasis.

## 2020-09-30 ENCOUNTER — APPOINTMENT (OUTPATIENT)
Dept: INTERNAL MEDICINE | Facility: CLINIC | Age: 40
End: 2020-09-30
Payer: COMMERCIAL

## 2020-09-30 VITALS
WEIGHT: 293 LBS | TEMPERATURE: 97.6 F | BODY MASS INDEX: 54.61 KG/M2 | DIASTOLIC BLOOD PRESSURE: 83 MMHG | HEIGHT: 61.5 IN | HEART RATE: 82 BPM | SYSTOLIC BLOOD PRESSURE: 122 MMHG

## 2020-09-30 DIAGNOSIS — M62.838 OTHER MUSCLE SPASM: ICD-10-CM

## 2020-09-30 PROCEDURE — 99213 OFFICE O/P EST LOW 20 MIN: CPT
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
## 2020-09-30 NOTE — PHYSICAL EXAM
[No Spinal Tenderness] : no spinal tenderness [Normal] : normal gait, coordination grossly intact, no focal deficits and deep tendon reflexes were 2+ and symmetric [de-identified] : paraspinal tednerness over left cervical region  [de-identified] : p
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
## 2020-11-16 ENCOUNTER — TRANSCRIPTION ENCOUNTER (OUTPATIENT)
Age: 40
End: 2020-11-16

## 2020-12-23 PROBLEM — Z01.810 ENCOUNTER FOR PRE-OPERATIVE CARDIOVASCULAR CLEARANCE: Status: RESOLVED | Noted: 2020-06-24 | Resolved: 2020-12-23

## 2021-02-18 ENCOUNTER — APPOINTMENT (OUTPATIENT)
Dept: ORTHOPEDIC SURGERY | Facility: CLINIC | Age: 41
End: 2021-02-18
Payer: COMMERCIAL

## 2021-02-18 DIAGNOSIS — M65.832 OTHER SYNOVITIS AND TENOSYNOVITIS, LEFT FOREARM: ICD-10-CM

## 2021-02-18 PROCEDURE — 73110 X-RAY EXAM OF WRIST: CPT | Mod: 50

## 2021-02-18 PROCEDURE — 20550 NJX 1 TENDON SHEATH/LIGAMENT: CPT | Mod: LT

## 2021-02-18 PROCEDURE — 29125 APPL SHORT ARM SPLINT STATIC: CPT | Mod: 59,LT

## 2021-02-18 PROCEDURE — 99214 OFFICE O/P EST MOD 30 MIN: CPT | Mod: 25

## 2021-02-18 PROCEDURE — 99072 ADDL SUPL MATRL&STAF TM PHE: CPT

## 2021-02-18 PROCEDURE — 20526 THER INJECTION CARP TUNNEL: CPT | Mod: 59,LT

## 2021-02-18 NOTE — PROCEDURE
[FreeTextEntry1] : The skin was prepped with alcohol and sprayed with Ethyl Chloride. An injection of 0.5 cc 1% Lidocaine without epinephrine, 0.25 cc Kenalog 40 mg, and 0.25 cc Dexamethasone was administered into the leeft carpal tunnel. The patient tolerated the procedure well

## 2021-02-18 NOTE — DISCUSSION/SUMMARY
[FreeTextEntry1] : The underlying pathophysiology was reviewed with the patient. XR films were reviewed with the patient. Discussed at length the nature of the patient’s condition. Their bilateral wrist symptoms appear secondary to carpal tunnel syndrome. XR films were reviewed with the patient. \par \par The underlying pathophysiology was reviewed with the patient. Treatment options were discussed including; NSAIDs, injection, surgical intervention. \par \par The patient wishes to proceed with left carpal tunnel release at this time. The risks and benefits were reviewed with the patient. All of her questions were answered. She will meet with our surgical scheduler.	\par \par The patient wishes to proceed with a cortisone injection at this time. The skin was prepped with alcohol and sprayed with Ethyl Chloride. An injection of 0.5 cc 1% Lidocaine without epinephrine, 0.25 cc Kenalog 40 mg, and 0.25 cc Dexamethasone was administered into the left dorsal wrist along extensor tendons. The patient tolerated the procedure well. Apply ice. \par \par The patient was placed in a short arm splint. She was encouraged to utilize a carpal tunnel wrist brace if they are to engage in any strenuous activity or while sleeping to avoid compression of the median nerve. NSAIDs as tolerated.. \par \par  All questions answered, understanding verbalized. Patient in agreement with plan of care.

## 2021-02-18 NOTE — END OF VISIT
[FreeTextEntry3] : All medical record entries made by the Scribe were at my,  Dr. Austin Horner MD., direction and personally dictated by me on 02/18/2021. I have personally reviewed the chart and agree that the record accurately reflects my personal performance of the history, physical exam, assessment and plan.

## 2021-02-18 NOTE — PHYSICAL EXAM
[de-identified] : Patient is WDWN, alert, and in no acute distress. Breathing is unlabored. She is grossly oriented to person, place, and time. \par \par Right Wrist: Mild tenderness, no  edema, no deformities. No thenar atrophy. Full ROM with decreased sensation along median \par nerve distribution. \par Tests/Signs: Tinel's sign is negative over carpal tunnel, Phalen's test is positive. \par \par Left Wrist: Mid radiocarpal  tenderness, mild edema, no deformities. No thenar atrophy. Full ROM with decreased sensation along median \par nerve distribution. Pain along extensor tendons with resistive wrist extension.\par Tests/Signs: Tinel's sign is positive over carpal tunnel, Phalen's test is positive.		  [de-identified] : AP, lateral and oblique views of the bilateral hands were obtained today and revealed no abnormalities. No acute fracture. No dislocation. Cartilage spaces are maintained. 	 \par \par

## 2021-02-18 NOTE — ADDENDUM
[FreeTextEntry1] : I, Glen Morrow wrote this note acting as a scribe for Dr. Austin Horner MD on Feb 18, 2021.\par

## 2021-02-18 NOTE — HISTORY OF PRESENT ILLNESS
[FreeTextEntry1] : ARTURO JOSEPH is a 40 year female who presents for initial evaluation of left hand/wrist numbness and tingling for a few years. Patient works as a paramedic. She reports picking up a cardiac monitor and felt a sharp pain that caused her to drop it. Patient denies any fall or injury. Patient report the first time she felt the pain was grabbing a handbar to lift herself up into the ambulance. Patient has used Tylenol prn. She reports she had more pain, numbness, tingling in the morning when she wakes up. She reports when holding her hands on the wheel for a long time while driving, she will feel the pain onset.

## 2021-02-24 ENCOUNTER — TRANSCRIPTION ENCOUNTER (OUTPATIENT)
Age: 41
End: 2021-02-24

## 2021-02-25 ENCOUNTER — APPOINTMENT (OUTPATIENT)
Dept: GASTROENTEROLOGY | Facility: CLINIC | Age: 41
End: 2021-02-25

## 2021-03-11 ENCOUNTER — APPOINTMENT (OUTPATIENT)
Dept: OTOLARYNGOLOGY | Facility: CLINIC | Age: 41
End: 2021-03-11
Payer: COMMERCIAL

## 2021-03-11 VITALS
WEIGHT: 293 LBS | OXYGEN SATURATION: 98 % | SYSTOLIC BLOOD PRESSURE: 120 MMHG | TEMPERATURE: 98 F | BODY MASS INDEX: 54.61 KG/M2 | HEIGHT: 61.5 IN | DIASTOLIC BLOOD PRESSURE: 82 MMHG

## 2021-03-11 DIAGNOSIS — S02.2XXA FRACTURE OF NASAL BONES, INITIAL ENCOUNTER FOR CLOSED FRACTURE: ICD-10-CM

## 2021-03-11 DIAGNOSIS — J31.0 CHRONIC RHINITIS: ICD-10-CM

## 2021-03-11 DIAGNOSIS — J34.2 DEVIATED NASAL SEPTUM: ICD-10-CM

## 2021-03-11 PROCEDURE — 31231 NASAL ENDOSCOPY DX: CPT

## 2021-03-11 PROCEDURE — 99072 ADDL SUPL MATRL&STAF TM PHE: CPT

## 2021-03-11 PROCEDURE — 99204 OFFICE O/P NEW MOD 45 MIN: CPT | Mod: 25

## 2021-03-11 NOTE — ASSESSMENT
[FreeTextEntry1] : Ms. JOSEPH is a 40 year female with h/o nasal trauma, now with deviated septum and bilateral inferior turbinate hypertrophy \par - - D/w patient r/b/a of septoplasty, bilateral inferior turbinate reduction \par - d./w patient possibility of intranasal splint for 1 week\par - postoperative instructions were discussed with the patient including no aerobic exercise for 2 weeks, no heavy lifting for 2 weeks\par - risks of bleeding and septal perforation were discussed \par - all questions were answered \par - advised that nasal asymmetry is likely pre-existing - 2/2 to nostril flaring and is unlikely to be changed with surgery.  Migraines and snoring may or may not improve. She understands.

## 2021-03-11 NOTE — PHYSICAL EXAM
[Midline] : trachea located in midline position [Normal] : no rashes [Nasal Endoscopy Performed] : nasal endoscopy was performed, see procedure section for findings [] : septum deviated bilaterally [de-identified] : mild asymmetry in the nostrils - left flares more than the right [de-identified] : enlarged [de-identified] : copious mucoid

## 2021-03-11 NOTE — PROCEDURE
[FreeTextEntry6] : reason for exam: anterior rhinoscopy insufficient for symptom evaluation \par \par Fiberoptic nasal endoscopy was performed.  R/b/a of procedure was explained to the patient and they agreed to proceed with procedure.  Nasal cavities were treated with afrin and lidocaine prior to nasal endoscopy to make the patient more comfortable. B/l inferior turbinate hypertrophy, severe with edematous mucosa and scattered mucoid secretions.  Remainder of exam normal, including b/l middle turbinates, superior turbinates, inferior, middle and superior meati and sphenoethmoidal recess.  No nasal polyps.  Septum WITH PARTIAL DEVIATION TO THE RIGHT AND FLOOR SPUR ON THE LEFT/. \par \par scope #: G5

## 2021-03-11 NOTE — CONSULT LETTER
[Dear  ___] : Dear  [unfilled], [Consult Letter:] : I had the pleasure of evaluating your patient, [unfilled]. [Please see my note below.] : Please see my note below. [Consult Closing:] : Thank you very much for allowing me to participate in the care of this patient.  If you have any questions, please do not hesitate to contact me. [FreeTextEntry3] : Sincerely, \par \par Gisela Powers MD\par Otolaryngology- Facial Plastics \par [address]\[place] [address]\par (P) - [phone]\par (F) - [phone]

## 2021-03-11 NOTE — HISTORY OF PRESENT ILLNESS
[de-identified] : Ms. JOSEPH is a 40 year female with nasal fx 8 months prior, fell in house, hit face on kitchen table \par since then + snoring, nasal obstruction b/l but right worse than left \par no h/o allergic rhinitis \par + post nasal drip \par looks crooked from base view.\par also notes increase in migraines after injury 2-3 migraines per week \par previously tried nasal sprays but developed epistaxis

## 2021-03-25 DIAGNOSIS — Z01.812 ENCOUNTER FOR PREPROCEDURAL LABORATORY EXAMINATION: ICD-10-CM

## 2021-03-28 LAB — SARS-COV-2 N GENE NPH QL NAA+PROBE: NOT DETECTED

## 2021-03-30 DIAGNOSIS — H81.10 BENIGN PAROXYSMAL VERTIGO, UNSPECIFIED EAR: ICD-10-CM

## 2021-03-31 ENCOUNTER — APPOINTMENT (OUTPATIENT)
Age: 41
End: 2021-03-31

## 2021-04-01 ENCOUNTER — APPOINTMENT (OUTPATIENT)
Age: 41
End: 2021-04-01

## 2021-04-21 ENCOUNTER — NON-APPOINTMENT (OUTPATIENT)
Age: 41
End: 2021-04-21

## 2021-04-21 ENCOUNTER — APPOINTMENT (OUTPATIENT)
Dept: PULMONOLOGY | Facility: CLINIC | Age: 41
End: 2021-04-21
Payer: COMMERCIAL

## 2021-04-21 ENCOUNTER — APPOINTMENT (OUTPATIENT)
Dept: CARDIOLOGY | Facility: CLINIC | Age: 41
End: 2021-04-21
Payer: COMMERCIAL

## 2021-04-21 VITALS
DIASTOLIC BLOOD PRESSURE: 80 MMHG | TEMPERATURE: 97.3 F | HEIGHT: 61 IN | WEIGHT: 293 LBS | SYSTOLIC BLOOD PRESSURE: 122 MMHG | BODY MASS INDEX: 55.32 KG/M2 | OXYGEN SATURATION: 98 % | HEART RATE: 72 BPM | RESPIRATION RATE: 16 BRPM

## 2021-04-21 VITALS
OXYGEN SATURATION: 98 % | HEIGHT: 61 IN | SYSTOLIC BLOOD PRESSURE: 129 MMHG | RESPIRATION RATE: 15 BRPM | TEMPERATURE: 98.3 F | BODY MASS INDEX: 55.32 KG/M2 | DIASTOLIC BLOOD PRESSURE: 79 MMHG | HEART RATE: 75 BPM | WEIGHT: 293 LBS

## 2021-04-21 DIAGNOSIS — Z01.810 ENCOUNTER FOR PREPROCEDURAL CARDIOVASCULAR EXAMINATION: ICD-10-CM

## 2021-04-21 DIAGNOSIS — R01.1 CARDIAC MURMUR, UNSPECIFIED: ICD-10-CM

## 2021-04-21 DIAGNOSIS — Z01.811 ENCOUNTER FOR PREPROCEDURAL RESPIRATORY EXAMINATION: ICD-10-CM

## 2021-04-21 PROCEDURE — 94010 BREATHING CAPACITY TEST: CPT

## 2021-04-21 PROCEDURE — ZZZZZ: CPT

## 2021-04-21 PROCEDURE — 99214 OFFICE O/P EST MOD 30 MIN: CPT | Mod: 25

## 2021-04-21 PROCEDURE — 99214 OFFICE O/P EST MOD 30 MIN: CPT

## 2021-04-21 PROCEDURE — 99072 ADDL SUPL MATRL&STAF TM PHE: CPT

## 2021-04-21 PROCEDURE — 94727 GAS DIL/WSHOT DETER LNG VOL: CPT

## 2021-04-21 PROCEDURE — 94729 DIFFUSING CAPACITY: CPT

## 2021-04-21 PROCEDURE — 93000 ELECTROCARDIOGRAM COMPLETE: CPT | Mod: NC

## 2021-04-21 NOTE — ASSESSMENT
[FreeTextEntry1] : 38 year old female Hx bilateral pulmonary emboli 11/2018 presents for pre surgical respiratory evaluation, mild allergy related asthma \par \par Discontinue Breo-Ellipta 100-25 daily.\par Begin Arnuity Ellipta 100 mcg daily\par Rescue 2 puffs only if needed \par \par Patient Pulmonary status optimized for planned surgical procedure.\par

## 2021-04-21 NOTE — HISTORY OF PRESENT ILLNESS
[Never] : never [TextBox_4] : Patient is a 38 year old female Hx bilateral pulmonary emboli 11/2018, unprovoked, now off Eliquis, Hx asthma,  presents to [facility] follow up. Patient had gastric sleeve surgery however did not go well.  She is anticipating revision of prior surgery next month.  She reports no increased respiratory symptoms. She denies sleep related symptoms, i.e. snoring or excessive daytime sleepiness.  She is here for pre-surgical respiratory evaluation

## 2021-04-21 NOTE — PROCEDURE
[FreeTextEntry1] : PFT 7/22/2019 personally reviewed mild obstructive ventilatory defect without gas exchange abnormality and mild bronchodilator response.\par \par PFT 7/8/20 personally reviewed Normal PFT\par \par PFT 4/21/21 personally reviewed no obstructive defect, mild restrictive defect

## 2021-04-21 NOTE — REASON FOR VISIT
[Follow-Up - Clinic] : a clinic follow-up of [Dyspnea] : dyspnea [Palpitations] : palpitations [FreeTextEntry2] : pre-op cardiac clearance [FreeTextEntry1] : This is 40-year-old female past medical history significant for asthma, dyspepsia, status post gastric sleeve in 2012, status post repair of a diaphragmatic hernia, history of bilateral PE (October 2018) who comes in for cardiac preoperative clearance.  The patient is scheduled for bariatric surgery with gastric sleeve revision.\par \par She denies chest pain, shortness of breath, dizziness or syncope.\par \par Having "Mini-bypass" and gastric sleeve revision May 14th. First gastric procedure was Dec of 2012. She is a paramedic. \par \par

## 2021-04-21 NOTE — ASSESSMENT
[FreeTextEntry1] : This is 40-year-old female past medical history significant for asthma, dyspepsia, status post gastric sleeve in 2012, status post repair of a diaphragmatic hernia, status post bilateral pulmonary emboli October 2018, who comes in for cardiac preoperative clearance.  The patient is scheduled for bariatric surgery with gastric sleeve revision.\par \par She has normal echocardiogram and exercise stress test in June of 2020. Her blood pressure is well controlled.  She is clear from a cardiac standpoint to proceed with bariatric surgery on May 14th 2021.She has recent blood work through her primary care physician without any abnormalities. Patient will follow up with us again in three months.

## 2021-04-21 NOTE — DISCUSSION/SUMMARY
[FreeTextEntry1] : This is 40-year-old female past medical history significant for asthma, dyspepsia, status post gastric sleeve in 2012, status post repair of a diaphragmatic hernia, status post multiple pulmonary emboli October 2018, who comes in for cardiac preoperative consultation.  The patient is scheduled for bariatric surgery with gastric sleeve revision in the next month.\par She denies chest pain, shortness of breath, dizziness or syncope.  The patient was gaining weight despite prior gastric sleeve surgery.  The patient reports that previous surgery did not remove enough of her stomach.  She is excited about going for the revision to achieve a weight loss state.\par Electrocardiogram done April 21, 2021 demonstrates normal sinus rhythm rate 74 bpm and is otherwise remarkable for poor R wave progression.\par The patient had a normal exercise stress test July 8, 2020.\par The patient had an echo Doppler examination June 23, 2020 which demonstrated minimal tricuspid valve regurgitation with normal left ventricular function and ejection fraction 67%.\par \par This patient is cleared from a cardiac standpoint for bariatric surgery pending acceptable preoperative laboratory values.  Please maintain prophylaxis for deep venous thrombosis.  The patient should have an incentive spirometer at the perioperative period.  Please avoid overhydration.  Maintain a normal potassium level.\par \par The patient will follow up with me in 3 to 4 months.\par PMH:\par She does not drink excessive caffeine or alcohol.  She has no history of rheumatic fever.  She is allergic to sulfa (hives, airway compromise, and rash).\par The patient presented to [facility] October 2018 with progressive shortness of breath.  She had a CTA done which demonstrated bilateral pulmonary emboli (she was told that she had 5), and was placed on anticoagulation.  She was seen by hematologist and her evaluation was reportedly negative.  She had been placed on Eliquis 5 mg twice a day. 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 A detailed discussion of lifestyle modification was done today. The patient has a good understanding of the diagnosis, and treatment plan. Lifestyle modification was also outlined.

## 2021-04-21 NOTE — REASON FOR VISIT
[Follow-Up] : a follow-up visit [Asthma] : asthma [Pre-op Risk Stratification] : pre-op risk stratification

## 2021-04-21 NOTE — PHYSICAL EXAM
[General Appearance - Well Developed] : well developed [Normal Appearance] : normal appearance [Well Groomed] : well groomed [General Appearance - Well Nourished] : well nourished [No Deformities] : no deformities [General Appearance - In No Acute Distress] : no acute distress [Normal Conjunctiva] : the conjunctiva exhibited no abnormalities [Eyelids - No Xanthelasma] : the eyelids demonstrated no xanthelasmas [Normal Oral Mucosa] : normal oral mucosa [Normal Jugular Venous A Waves Present] : normal jugular venous A waves present [Normal Jugular Venous V Waves Present] : normal jugular venous V waves present [No Jugular Venous Singleton A Waves] : no jugular venous singleton A waves [] : no respiratory distress [Respiration, Rhythm And Depth] : normal respiratory rhythm and effort [Exaggerated Use Of Accessory Muscles For Inspiration] : no accessory muscle use [Auscultation Breath Sounds / Voice Sounds] : lungs were clear to auscultation bilaterally [Abdomen Soft] : soft [Abnormal Walk] : normal gait [Gait - Sufficient For Exercise Testing] : the gait was sufficient for exercise testing [Cyanosis, Localized] : no localized cyanosis [Skin Color & Pigmentation] : normal skin color and pigmentation [Skin Turgor] : normal skin turgor [No Xanthoma] : no  xanthoma was observed [Oriented To Time, Place, And Person] : oriented to person, place, and time [Affect] : the affect was normal [Mood] : the mood was normal [No Anxiety] : not feeling anxious [5th Left ICS - MCL] : palpated at the 5th LICS in the midclavicular line [Normal] : normal [No Precordial Heave] : no precordial heave was noted [Normal Rate] : normal [Rhythm Regular] : regular [Normal S1] : normal S1 [Normal S2] : normal S2 [No Gallop] : no gallop heard [Click] : a ~M click was heard [I] : a grade 1 [2+] : left 2+ [No Abnormalities] : the abdominal aorta was not enlarged and no bruit was heard [No Pitting Edema] : no pitting edema present [S3] : no S3 [S4] : no S4 [Pericardial Rub] : no pericardial rub [Left Carotid Bruit] : no bruit heard over the left carotid [Right Carotid Bruit] : no bruit heard over the right carotid

## 2021-04-22 RX ORDER — NEBULIZER AND COMPRESSOR
EACH MISCELLANEOUS
Qty: 1 | Refills: 0 | Status: COMPLETED | COMMUNITY
Start: 2018-11-13 | End: 2021-04-22

## 2021-04-22 RX ORDER — TIZANIDINE 4 MG/1
4 TABLET ORAL 3 TIMES DAILY
Qty: 21 | Refills: 0 | Status: COMPLETED | COMMUNITY
Start: 2020-09-30 | End: 2021-04-22

## 2021-04-22 RX ORDER — FERROUS GLUCONATE 324(38)MG
324 (38 FE) TABLET ORAL DAILY
Qty: 90 | Refills: 1 | Status: COMPLETED | COMMUNITY
Start: 2020-06-26 | End: 2021-04-22

## 2021-04-22 RX ORDER — PREDNISONE 20 MG/1
20 TABLET ORAL
Qty: 10 | Refills: 0 | Status: COMPLETED | COMMUNITY
Start: 2020-09-30 | End: 2021-04-22

## 2021-05-05 ENCOUNTER — APPOINTMENT (OUTPATIENT)
Dept: INTERNAL MEDICINE | Facility: CLINIC | Age: 41
End: 2021-05-05
Payer: COMMERCIAL

## 2021-05-05 VITALS
DIASTOLIC BLOOD PRESSURE: 91 MMHG | OXYGEN SATURATION: 100 % | HEIGHT: 61 IN | TEMPERATURE: 98.1 F | HEART RATE: 83 BPM | SYSTOLIC BLOOD PRESSURE: 127 MMHG | BODY MASS INDEX: 55.32 KG/M2 | WEIGHT: 293 LBS

## 2021-05-05 DIAGNOSIS — Z01.818 ENCOUNTER FOR OTHER PREPROCEDURAL EXAMINATION: ICD-10-CM

## 2021-05-05 PROCEDURE — 99072 ADDL SUPL MATRL&STAF TM PHE: CPT

## 2021-05-05 PROCEDURE — 99214 OFFICE O/P EST MOD 30 MIN: CPT | Mod: 25

## 2021-05-05 PROCEDURE — 36415 COLL VENOUS BLD VENIPUNCTURE: CPT

## 2021-05-05 NOTE — HISTORY OF PRESENT ILLNESS
[No Pertinent Cardiac History] : no history of aortic stenosis, atrial fibrillation, coronary artery disease, recent myocardial infarction, or implantable device/pacemaker [Asthma] : asthma [No Adverse Anesthesia Reaction] : no adverse anesthesia reaction in self or family member [COPD] : no COPD [Sleep Apnea] : no sleep apnea [Smoker] : not a smoker [Chronic Anticoagulation] : no chronic anticoagulation [Chronic Kidney Disease] : no chronic kidney disease [Diabetes] : no diabetes [FreeTextEntry1] : Sleeve Revision [FreeTextEntry2] : 05/14/21 [FreeTextEntry3] : Dr Butts [FreeTextEntry4] : 40 year old female here today for pre op clearance for Gastric Sleeve Revision. \par \par 2018 had 5 PE . \par \par Already cleared by Cardio and Pulm. \par \par She does have a slight rash behind her knee.

## 2021-05-05 NOTE — ASSESSMENT
[FreeTextEntry4] : Patient was seen and examined for medical clearance. A full H and P was performed. Vitals taken.\par \par Repeat CBC given time when it was done and platelets, but platelets seems to be consistently elevated mildly

## 2021-05-05 NOTE — PHYSICAL EXAM
[Normal] : normal rate, regular rhythm, normal S1 and S2 and no murmur heard [de-identified] : small rash, on back of knee

## 2021-05-06 ENCOUNTER — OUTPATIENT (OUTPATIENT)
Dept: OUTPATIENT SERVICES | Facility: HOSPITAL | Age: 41
LOS: 1 days | End: 2021-05-06
Payer: COMMERCIAL

## 2021-05-06 VITALS
HEIGHT: 62 IN | RESPIRATION RATE: 16 BRPM | HEART RATE: 69 BPM | SYSTOLIC BLOOD PRESSURE: 126 MMHG | OXYGEN SATURATION: 100 % | TEMPERATURE: 98 F | DIASTOLIC BLOOD PRESSURE: 87 MMHG | WEIGHT: 293 LBS

## 2021-05-06 DIAGNOSIS — Z98.84 BARIATRIC SURGERY STATUS: Chronic | ICD-10-CM

## 2021-05-06 DIAGNOSIS — R13.10 DYSPHAGIA, UNSPECIFIED: ICD-10-CM

## 2021-05-06 DIAGNOSIS — E66.01 MORBID (SEVERE) OBESITY DUE TO EXCESS CALORIES: ICD-10-CM

## 2021-05-06 DIAGNOSIS — Z87.19 PERSONAL HISTORY OF OTHER DISEASES OF THE DIGESTIVE SYSTEM: Chronic | ICD-10-CM

## 2021-05-06 DIAGNOSIS — Z98.890 OTHER SPECIFIED POSTPROCEDURAL STATES: Chronic | ICD-10-CM

## 2021-05-06 DIAGNOSIS — Z01.818 ENCOUNTER FOR OTHER PREPROCEDURAL EXAMINATION: ICD-10-CM

## 2021-05-06 LAB
A1C WITH ESTIMATED AVERAGE GLUCOSE RESULT: 5.5 % — SIGNIFICANT CHANGE UP (ref 4–5.6)
ALBUMIN SERPL ELPH-MCNC: 3.5 G/DL — SIGNIFICANT CHANGE UP (ref 3.3–5)
ANION GAP SERPL CALC-SCNC: 3 MMOL/L — LOW (ref 5–17)
APPEARANCE UR: CLEAR — SIGNIFICANT CHANGE UP
APTT BLD: 31.3 SEC — SIGNIFICANT CHANGE UP (ref 27.5–35.5)
BASOPHILS # BLD AUTO: 0.04 K/UL — SIGNIFICANT CHANGE UP (ref 0–0.2)
BASOPHILS NFR BLD AUTO: 0.5 % — SIGNIFICANT CHANGE UP (ref 0–2)
BILIRUB UR-MCNC: NEGATIVE — SIGNIFICANT CHANGE UP
BUN SERPL-MCNC: 12 MG/DL — SIGNIFICANT CHANGE UP (ref 7–23)
CALCIUM SERPL-MCNC: 9.1 MG/DL — SIGNIFICANT CHANGE UP (ref 8.5–10.1)
CHLORIDE SERPL-SCNC: 106 MMOL/L — SIGNIFICANT CHANGE UP (ref 96–108)
CO2 SERPL-SCNC: 29 MMOL/L — SIGNIFICANT CHANGE UP (ref 22–31)
COHGB MFR BLDV: 2.2 % — HIGH (ref 0–1.5)
COLOR SPEC: YELLOW — SIGNIFICANT CHANGE UP
CREAT SERPL-MCNC: 0.67 MG/DL — SIGNIFICANT CHANGE UP (ref 0.5–1.3)
DIFF PNL FLD: NEGATIVE — SIGNIFICANT CHANGE UP
EOSINOPHIL # BLD AUTO: 0.22 K/UL — SIGNIFICANT CHANGE UP (ref 0–0.5)
EOSINOPHIL NFR BLD AUTO: 2.6 % — SIGNIFICANT CHANGE UP (ref 0–6)
ESTIMATED AVERAGE GLUCOSE: 111 MG/DL — SIGNIFICANT CHANGE UP (ref 68–114)
GLUCOSE SERPL-MCNC: 83 MG/DL — SIGNIFICANT CHANGE UP (ref 70–99)
GLUCOSE UR QL: NEGATIVE MG/DL — SIGNIFICANT CHANGE UP
HCT VFR BLD CALC: 39.5 % — SIGNIFICANT CHANGE UP (ref 34.5–45)
HGB BLD-MCNC: 11.7 G/DL — SIGNIFICANT CHANGE UP (ref 11.5–15.5)
IMM GRANULOCYTES NFR BLD AUTO: 0.4 % — SIGNIFICANT CHANGE UP (ref 0–1.5)
INR BLD: 1.15 RATIO — SIGNIFICANT CHANGE UP (ref 0.88–1.16)
KETONES UR-MCNC: NEGATIVE — SIGNIFICANT CHANGE UP
LEUKOCYTE ESTERASE UR-ACNC: ABNORMAL
LYMPHOCYTES # BLD AUTO: 2.13 K/UL — SIGNIFICANT CHANGE UP (ref 1–3.3)
LYMPHOCYTES # BLD AUTO: 25.1 % — SIGNIFICANT CHANGE UP (ref 13–44)
MCHC RBC-ENTMCNC: 23.2 PG — LOW (ref 27–34)
MCHC RBC-ENTMCNC: 29.6 GM/DL — LOW (ref 32–36)
MCV RBC AUTO: 78.4 FL — LOW (ref 80–100)
MONOCYTES # BLD AUTO: 0.57 K/UL — SIGNIFICANT CHANGE UP (ref 0–0.9)
MONOCYTES NFR BLD AUTO: 6.7 % — SIGNIFICANT CHANGE UP (ref 2–14)
NEUTROPHILS # BLD AUTO: 5.48 K/UL — SIGNIFICANT CHANGE UP (ref 1.8–7.4)
NEUTROPHILS NFR BLD AUTO: 64.7 % — SIGNIFICANT CHANGE UP (ref 43–77)
NITRITE UR-MCNC: NEGATIVE — SIGNIFICANT CHANGE UP
PH UR: 7 — SIGNIFICANT CHANGE UP (ref 5–8)
PLATELET # BLD AUTO: 412 K/UL — HIGH (ref 150–400)
POTASSIUM SERPL-MCNC: 4.1 MMOL/L — SIGNIFICANT CHANGE UP (ref 3.5–5.3)
POTASSIUM SERPL-SCNC: 4.1 MMOL/L — SIGNIFICANT CHANGE UP (ref 3.5–5.3)
PROT UR-MCNC: NEGATIVE MG/DL — SIGNIFICANT CHANGE UP
PROTHROM AB SERPL-ACNC: 13.2 SEC — SIGNIFICANT CHANGE UP (ref 10.6–13.6)
RBC # BLD: 5.04 M/UL — SIGNIFICANT CHANGE UP (ref 3.8–5.2)
RBC # FLD: 15.9 % — HIGH (ref 10.3–14.5)
SODIUM SERPL-SCNC: 138 MMOL/L — SIGNIFICANT CHANGE UP (ref 135–145)
SP GR SPEC: 1.01 — SIGNIFICANT CHANGE UP (ref 1.01–1.02)
UROBILINOGEN FLD QL: NEGATIVE MG/DL — SIGNIFICANT CHANGE UP
WBC # BLD: 8.47 K/UL — SIGNIFICANT CHANGE UP (ref 3.8–10.5)
WBC # FLD AUTO: 8.47 K/UL — SIGNIFICANT CHANGE UP (ref 3.8–10.5)

## 2021-05-06 PROCEDURE — 85025 COMPLETE CBC W/AUTO DIFF WBC: CPT

## 2021-05-06 PROCEDURE — 93010 ELECTROCARDIOGRAM REPORT: CPT

## 2021-05-06 PROCEDURE — 85610 PROTHROMBIN TIME: CPT

## 2021-05-06 PROCEDURE — 80048 BASIC METABOLIC PNL TOTAL CA: CPT

## 2021-05-06 PROCEDURE — 86900 BLOOD TYPING SEROLOGIC ABO: CPT

## 2021-05-06 PROCEDURE — 86901 BLOOD TYPING SEROLOGIC RH(D): CPT

## 2021-05-06 PROCEDURE — 85730 THROMBOPLASTIN TIME PARTIAL: CPT

## 2021-05-06 PROCEDURE — 82040 ASSAY OF SERUM ALBUMIN: CPT

## 2021-05-06 PROCEDURE — 36415 COLL VENOUS BLD VENIPUNCTURE: CPT

## 2021-05-06 PROCEDURE — 86850 RBC ANTIBODY SCREEN: CPT

## 2021-05-06 PROCEDURE — 71046 X-RAY EXAM CHEST 2 VIEWS: CPT

## 2021-05-06 PROCEDURE — 81001 URINALYSIS AUTO W/SCOPE: CPT

## 2021-05-06 PROCEDURE — 93005 ELECTROCARDIOGRAM TRACING: CPT

## 2021-05-06 PROCEDURE — 71046 X-RAY EXAM CHEST 2 VIEWS: CPT | Mod: 26

## 2021-05-06 PROCEDURE — G0463: CPT | Mod: 25

## 2021-05-06 PROCEDURE — 82375 ASSAY CARBOXYHB QUANT: CPT

## 2021-05-06 PROCEDURE — 83036 HEMOGLOBIN GLYCOSYLATED A1C: CPT

## 2021-05-06 RX ORDER — ASPIRIN/CALCIUM CARB/MAGNESIUM 324 MG
1 TABLET ORAL
Qty: 0 | Refills: 0 | DISCHARGE

## 2021-05-06 NOTE — H&P PST ADULT - HISTORY OF PRESENT ILLNESS
This is a 39 y/o female with a PMH of asthma (intubated 4 times, last in 1992), PE in October of 2018 (off all anticoagulation since 1/2020) and GERD. Pt with a BMI of 56.3. She had a gastric sleeve in 2012 and originally lost 80 pounds but has since gained that weight back. She is now scheduled for a gastric sleeve revision YANCI procedure. She denies any SOB, chest pain, palpations or fevers.

## 2021-05-06 NOTE — H&P PST ADULT - NSICDXPASTMEDICALHX_GEN_ALL_CORE_FT
PAST MEDICAL HISTORY:  Anxiety     Asthma intubated 4 times. Last time at age 12 (1992)    GERD (gastroesophageal reflux disease)     Knee dislocation, left, sequela     Meniscus tear Left knee    Morbid obesity     Obesity     Pulmonary embolism 2018 had 5 PEs off anticoagulation as of 1/2020    Vertigo, benign positional

## 2021-05-06 NOTE — H&P PST ADULT - NSANTHOSAYNRD_GEN_A_CORE
No. BENNETT screening performed.  STOP BANG Legend: 0-2 = LOW Risk; 3-4 = INTERMEDIATE Risk; 5-8 = HIGH Risk

## 2021-05-06 NOTE — H&P PST ADULT - ASSESSMENT
This is a 41 y/o female with obesity who is scheduled for a gastric sleeve revision YANCI procedure    Patient instructed on     1. To follow instructions as per ASU for NPO status   2. On the use of EZ sponges  3. Aware that she needs medical clearance (was done by Dr. Perales)  4. May take Arnuity inhaler on morning of procedure   5. Instructed to call [phone] to schedule COVID 19 appointment     CAPRINI SCORE    AGE RELATED RISK FACTORS                                                       MOBILITY RELATED FACTORS  [ ] Age 41-60 years                                            (1 Point)                  [ ] Bed rest                                                        (1 Point)  [ ] Age: 61-74 years                                           (2 Points)                [ ] Plaster cast                                                   (2 Points)  [ ] Age= 75 years                                              (3 Points)                 [ ] Bed bound for more than 72 hours                   (2 Points)    DISEASE RELATED RISK FACTORS                                               GENDER SPECIFIC FACTORS  [ ] Edema in the lower extremities                       (1 Point)                  [ ] Pregnancy                                                     (1 Point)  [ ] Varicose veins                                               (1 Point)                  [ ] Post-partum < 6 weeks                                   (1 Point)             X BMI > 25 Kg/m2                                            (1 Point)                  [ ] Hormonal therapy  or oral contraception            (1 Point)                 [ ] Sepsis (in the previous month)                        (1 Point)                  [ ] History of pregnancy complications  [ ] Pneumonia or serious lung disease                                               [ ] Unexplained or recurrent                       (1 Point)           (in the previous month)                               (1 Point)  [ ] Abnormal pulmonary function test                     (1 Point)                 SURGERY RELATED RISK FACTORS  [ ] Acute myocardial infarction                              (1 Point)                 [ ]  Section                                            (1 Point)  [ ] Congestive heart failure (in the previous month)  (1 Point)                 [ ] Minor surgery                                                 (1 Point)   [ ] Inflammatory bowel disease                             (1 Point)                 [ ] Arthroscopic surgery                                        (2 Points)  [ ] Central venous access                                    (2 Points)                X  General surgery lasting more than 45 minutes   (2 Points)       [ ] Stroke (in the previous month)                          (5 Points)               [ ] Elective arthroplasty                                        (5 Points)            [ ] malignancy                                                             (2 points)                                                                                                                                 HEMATOLOGY RELATED FACTORS                                                 TRAUMA RELATED RISK FACTORS  [ ] Prior episodes of VTE                                     (3 Points)                 [ ] Fracture of the hip, pelvis, or leg                       (5 Points)  [ ] Positive family history for VTE                         (3 Points)                 [ ] Acute spinal cord injury (in the previous month)  (5 Points)  [ ] Prothrombin 77826 A                                      (3 Points)                 [ ] Paralysis  (less than 1 month)                          (5 Points)  [ ] Factor V Leiden                                             (3 Points)                 [ ] Multiple Trauma within 1 month                         (5 Points)  [ ] Lupus anticoagulants                                     (3 Points)                                                           [ ] Anticardiolipin antibodies                                (3 Points)                                                       [ ] High homocysteine in the blood                      (3 Points)                                             [ ] Other congenital or acquired thrombophilia       (3 Points)                                                [ ] Heparin induced thrombocytopenia                  (3 Points)                                          Total Score: 3    The Caprini score indicates this patient is at risk for a VTE event (score 3-5).  Most surgical patients in this group would benefit from pharmacologic prophylaxis.  The surgical team will determine the balance between VTE risk and bleeding risk

## 2021-05-06 NOTE — H&P PST ADULT - NSICDXFAMILYHX_GEN_ALL_CORE_FT
Subjective:      History was provided by the mother.    Rosita Garber is a 17 year old female who is here for this well-child visit.      There is no immunization history on file for this patient.  Patient's medications, allergies, past medical, surgical, social and family histories were reviewed and updated as appropriate.    Current Issues:  Current concerns include none.      Review of Nutrition:  Current diet: Good  Balanced diet? yes    Social Screening:   Parental relations: Good  Discipline concerns? no  Concerns regarding behavior with peers? no  School performance: doing well; no concerns  Secondhand smoke exposure? no    Screening Questions:  Risk factors for anemia: no  Risk factors for vision problems: no  Risk factors for hearing problems: no  Risk factors for tuberculosis: no  Risk factors for dyslipidemia: no    Objective:     Blood pressure 124/80, pulse (!) 114, temperature 98.7 °F (37.1 °C), temperature source Temporal, height 5' 3.23\" (1.606 m), weight (!) 104.7 kg (230 lb 12.8 oz), last menstrual period 10/28/2019, SpO2 98 %.      Growth parameters are noted and are appropriate for age.    General:   alert   Gait:   normal   Skin:   normal, warm and dry, with normal turgor   Oral cavity:   lips, mucosa, and tongue normal; teeth and gums normal   Eyes:   sclerae white, pupils equal and reactive, red reflex normal bilaterally   Ears:   normal bilaterally   Neck:   no adenopathy, no carotid bruit, no JVD, supple, symmetrical, trachea midline and thyroid not enlarged, symmetric, no tenderness/mass/nodules   Lungs:  clear to auscultation bilaterally   Heart:   regular rate and rhythm, S1, S2 normal, no murmur, click, rub or gallop   Abdomen:  Soft, non-tender; bowel sounds normal; no masses, no hepatosplenomegaly   :  exam deferred   Jm Stage:   5   Extremities:  extremities normal, atraumatic, no cyanosis or edema   Neuro:  normal without focal findings, mental status, speech normal, alert and  oriented x3, ALLEN and reflexes normal and symmetric       Assessment:     Well adolescent.     Plan:     1. Anticipatory guidance discussed.  Specific topics reviewed: importance of regular exercise, importance of varied diet, limit TV, media violence and minimize junk food.    2.  Weight management:  The patient was counseled regarding nutrition and physical activity.    3. Development: appropriate for age    4. Immunizations today: per orders.  History of previous adverse reactions to immunizations? no    5. Follow-up visit in 1 year for next well child visit, or sooner as needed.    Estevan Horowitz MD       FAMILY HISTORY:  Grandparent  Still living? Unknown  Family history of colon cancer, Age at diagnosis: Age Unknown    Uncle  Still living? Unknown  Family history of colon cancer, Age at diagnosis: Age Unknown

## 2021-05-06 NOTE — H&P PST ADULT - NSICDXPASTSURGICALHX_GEN_ALL_CORE_FT
PAST SURGICAL HISTORY:  H/O hiatal hernia 2012 during gastric sleeve surgery    H/O rectal polypectomy 2012 benign    Status post laparoscopic sleeve gastrectomy 2012

## 2021-05-06 NOTE — H&P PST ADULT - BP NONINVASIVE MEAN (MM HG)
Substance intoxication or withdrawal/Recent inpatient discharge/Non-compliant or not receiving treatment 100

## 2021-05-07 ENCOUNTER — NON-APPOINTMENT (OUTPATIENT)
Age: 41
End: 2021-05-07

## 2021-05-07 DIAGNOSIS — E66.01 MORBID (SEVERE) OBESITY DUE TO EXCESS CALORIES: ICD-10-CM

## 2021-05-07 DIAGNOSIS — Z01.818 ENCOUNTER FOR OTHER PREPROCEDURAL EXAMINATION: ICD-10-CM

## 2021-05-07 LAB
BASOPHILS # BLD AUTO: 0.06 K/UL
BASOPHILS NFR BLD AUTO: 0.6 %
EOSINOPHIL # BLD AUTO: 0.22 K/UL
EOSINOPHIL NFR BLD AUTO: 2.2 %
HCT VFR BLD CALC: 39.8 %
HGB BLD-MCNC: 11.9 G/DL
IMM GRANULOCYTES NFR BLD AUTO: 0.4 %
LYMPHOCYTES # BLD AUTO: 2.46 K/UL
LYMPHOCYTES NFR BLD AUTO: 24.4 %
MAN DIFF?: NORMAL
MCHC RBC-ENTMCNC: 23.8 PG
MCHC RBC-ENTMCNC: 29.9 GM/DL
MCV RBC AUTO: 79.6 FL
MONOCYTES # BLD AUTO: 0.65 K/UL
MONOCYTES NFR BLD AUTO: 6.4 %
NEUTROPHILS # BLD AUTO: 6.66 K/UL
NEUTROPHILS NFR BLD AUTO: 66 %
PLATELET # BLD AUTO: 458 K/UL
RBC # BLD: 5 M/UL
RBC # FLD: 15.9 %
WBC # FLD AUTO: 10.09 K/UL

## 2021-05-12 ENCOUNTER — APPOINTMENT (OUTPATIENT)
Dept: INTERNAL MEDICINE | Facility: CLINIC | Age: 41
End: 2021-05-12

## 2021-05-13 ENCOUNTER — NON-APPOINTMENT (OUTPATIENT)
Age: 41
End: 2021-05-13

## 2021-05-14 ENCOUNTER — RESULT REVIEW (OUTPATIENT)
Age: 41
End: 2021-05-14

## 2021-05-14 ENCOUNTER — INPATIENT (INPATIENT)
Facility: HOSPITAL | Age: 41
LOS: 1 days | Discharge: HOME | DRG: 621 | End: 2021-05-16
Attending: SURGERY | Admitting: SURGERY
Payer: COMMERCIAL

## 2021-05-14 VITALS
WEIGHT: 293 LBS | HEIGHT: 62 IN | DIASTOLIC BLOOD PRESSURE: 70 MMHG | RESPIRATION RATE: 16 BRPM | TEMPERATURE: 98 F | OXYGEN SATURATION: 99 % | SYSTOLIC BLOOD PRESSURE: 120 MMHG | HEART RATE: 68 BPM

## 2021-05-14 DIAGNOSIS — Z88.8 ALLERGY STATUS TO OTHER DRUGS, MEDICAMENTS AND BIOLOGICAL SUBSTANCES STATUS: ICD-10-CM

## 2021-05-14 DIAGNOSIS — E66.01 MORBID (SEVERE) OBESITY DUE TO EXCESS CALORIES: ICD-10-CM

## 2021-05-14 DIAGNOSIS — Z98.890 OTHER SPECIFIED POSTPROCEDURAL STATES: Chronic | ICD-10-CM

## 2021-05-14 DIAGNOSIS — R13.10 DYSPHAGIA, UNSPECIFIED: ICD-10-CM

## 2021-05-14 DIAGNOSIS — K44.9 DIAPHRAGMATIC HERNIA WITHOUT OBSTRUCTION OR GANGRENE: ICD-10-CM

## 2021-05-14 DIAGNOSIS — Z88.2 ALLERGY STATUS TO SULFONAMIDES: ICD-10-CM

## 2021-05-14 DIAGNOSIS — F41.9 ANXIETY DISORDER, UNSPECIFIED: ICD-10-CM

## 2021-05-14 DIAGNOSIS — Z98.84 BARIATRIC SURGERY STATUS: Chronic | ICD-10-CM

## 2021-05-14 DIAGNOSIS — Z86.711 PERSONAL HISTORY OF PULMONARY EMBOLISM: ICD-10-CM

## 2021-05-14 DIAGNOSIS — Z87.19 PERSONAL HISTORY OF OTHER DISEASES OF THE DIGESTIVE SYSTEM: Chronic | ICD-10-CM

## 2021-05-14 PROBLEM — K21.9 GASTRO-ESOPHAGEAL REFLUX DISEASE WITHOUT ESOPHAGITIS: Chronic | Status: ACTIVE | Noted: 2021-05-06

## 2021-05-14 LAB — HCG UR QL: NEGATIVE — SIGNIFICANT CHANGE UP

## 2021-05-14 PROCEDURE — 82962 GLUCOSE BLOOD TEST: CPT

## 2021-05-14 PROCEDURE — 88307 TISSUE EXAM BY PATHOLOGIST: CPT | Mod: 26

## 2021-05-14 PROCEDURE — 80048 BASIC METABOLIC PNL TOTAL CA: CPT

## 2021-05-14 PROCEDURE — 81025 URINE PREGNANCY TEST: CPT

## 2021-05-14 PROCEDURE — 86769 SARS-COV-2 COVID-19 ANTIBODY: CPT

## 2021-05-14 PROCEDURE — C1889: CPT

## 2021-05-14 PROCEDURE — 85025 COMPLETE CBC W/AUTO DIFF WBC: CPT

## 2021-05-14 PROCEDURE — 88307 TISSUE EXAM BY PATHOLOGIST: CPT

## 2021-05-14 PROCEDURE — C9113: CPT

## 2021-05-14 PROCEDURE — 36415 COLL VENOUS BLD VENIPUNCTURE: CPT

## 2021-05-14 PROCEDURE — 85027 COMPLETE CBC AUTOMATED: CPT

## 2021-05-14 RX ORDER — ONDANSETRON 8 MG/1
4 TABLET, FILM COATED ORAL ONCE
Refills: 0 | Status: DISCONTINUED | OUTPATIENT
Start: 2021-05-14 | End: 2021-05-14

## 2021-05-14 RX ORDER — OXYCODONE HYDROCHLORIDE 5 MG/1
10 TABLET ORAL EVERY 4 HOURS
Refills: 0 | Status: DISCONTINUED | OUTPATIENT
Start: 2021-05-14 | End: 2021-05-16

## 2021-05-14 RX ORDER — HEPARIN SODIUM 5000 [USP'U]/ML
5000 INJECTION INTRAVENOUS; SUBCUTANEOUS ONCE
Refills: 0 | Status: COMPLETED | OUTPATIENT
Start: 2021-05-14 | End: 2021-05-14

## 2021-05-14 RX ORDER — DEXTROSE 50 % IN WATER 50 %
25 SYRINGE (ML) INTRAVENOUS ONCE
Refills: 0 | Status: DISCONTINUED | OUTPATIENT
Start: 2021-05-14 | End: 2021-05-16

## 2021-05-14 RX ORDER — INSULIN LISPRO 100/ML
VIAL (ML) SUBCUTANEOUS EVERY 6 HOURS
Refills: 0 | Status: DISCONTINUED | OUTPATIENT
Start: 2021-05-14 | End: 2021-05-16

## 2021-05-14 RX ORDER — ENOXAPARIN SODIUM 100 MG/ML
40 INJECTION SUBCUTANEOUS EVERY 12 HOURS
Refills: 0 | Status: DISCONTINUED | OUTPATIENT
Start: 2021-05-14 | End: 2021-05-16

## 2021-05-14 RX ORDER — GLUCAGON INJECTION, SOLUTION 0.5 MG/.1ML
1 INJECTION, SOLUTION SUBCUTANEOUS ONCE
Refills: 0 | Status: DISCONTINUED | OUTPATIENT
Start: 2021-05-14 | End: 2021-05-16

## 2021-05-14 RX ORDER — MORPHINE SULFATE 50 MG/1
4 CAPSULE, EXTENDED RELEASE ORAL ONCE
Refills: 0 | Status: DISCONTINUED | OUTPATIENT
Start: 2021-05-14 | End: 2021-05-14

## 2021-05-14 RX ORDER — SODIUM CHLORIDE 9 MG/ML
1000 INJECTION, SOLUTION INTRAVENOUS
Refills: 0 | Status: DISCONTINUED | OUTPATIENT
Start: 2021-05-14 | End: 2021-05-14

## 2021-05-14 RX ORDER — SODIUM CHLORIDE 9 MG/ML
1000 INJECTION, SOLUTION INTRAVENOUS
Refills: 0 | Status: DISCONTINUED | OUTPATIENT
Start: 2021-05-14 | End: 2021-05-16

## 2021-05-14 RX ORDER — OXYCODONE HYDROCHLORIDE 5 MG/1
5 TABLET ORAL EVERY 4 HOURS
Refills: 0 | Status: DISCONTINUED | OUTPATIENT
Start: 2021-05-14 | End: 2021-05-16

## 2021-05-14 RX ORDER — APREPITANT 80 MG/1
80 CAPSULE ORAL ONCE
Refills: 0 | Status: COMPLETED | OUTPATIENT
Start: 2021-05-14 | End: 2021-05-14

## 2021-05-14 RX ORDER — KETOROLAC TROMETHAMINE 30 MG/ML
30 SYRINGE (ML) INJECTION EVERY 6 HOURS
Refills: 0 | Status: DISCONTINUED | OUTPATIENT
Start: 2021-05-14 | End: 2021-05-16

## 2021-05-14 RX ORDER — ALBUTEROL 90 UG/1
2 AEROSOL, METERED ORAL EVERY 6 HOURS
Refills: 0 | Status: DISCONTINUED | OUTPATIENT
Start: 2021-05-14 | End: 2021-05-16

## 2021-05-14 RX ORDER — HYDROMORPHONE HYDROCHLORIDE 2 MG/ML
0.5 INJECTION INTRAMUSCULAR; INTRAVENOUS; SUBCUTANEOUS
Refills: 0 | Status: DISCONTINUED | OUTPATIENT
Start: 2021-05-14 | End: 2021-05-14

## 2021-05-14 RX ORDER — ACETAMINOPHEN 500 MG
1000 TABLET ORAL ONCE
Refills: 0 | Status: COMPLETED | OUTPATIENT
Start: 2021-05-14 | End: 2021-05-14

## 2021-05-14 RX ORDER — ACETAMINOPHEN 500 MG
1000 TABLET ORAL ONCE
Refills: 0 | Status: DISCONTINUED | OUTPATIENT
Start: 2021-05-14 | End: 2021-05-16

## 2021-05-14 RX ORDER — ONDANSETRON 8 MG/1
4 TABLET, FILM COATED ORAL EVERY 6 HOURS
Refills: 0 | Status: DISCONTINUED | OUTPATIENT
Start: 2021-05-14 | End: 2021-05-16

## 2021-05-14 RX ORDER — DEXTROSE 50 % IN WATER 50 %
15 SYRINGE (ML) INTRAVENOUS ONCE
Refills: 0 | Status: DISCONTINUED | OUTPATIENT
Start: 2021-05-14 | End: 2021-05-16

## 2021-05-14 RX ORDER — PANTOPRAZOLE SODIUM 20 MG/1
40 TABLET, DELAYED RELEASE ORAL EVERY 24 HOURS
Refills: 0 | Status: DISCONTINUED | OUTPATIENT
Start: 2021-05-14 | End: 2021-05-16

## 2021-05-14 RX ADMIN — OXYCODONE HYDROCHLORIDE 10 MILLIGRAM(S): 5 TABLET ORAL at 19:47

## 2021-05-14 RX ADMIN — HYDROMORPHONE HYDROCHLORIDE 0.5 MILLIGRAM(S): 2 INJECTION INTRAMUSCULAR; INTRAVENOUS; SUBCUTANEOUS at 18:35

## 2021-05-14 RX ADMIN — MORPHINE SULFATE 4 MILLIGRAM(S): 50 CAPSULE, EXTENDED RELEASE ORAL at 22:14

## 2021-05-14 RX ADMIN — Medication 0.5 MILLIGRAM(S): at 18:38

## 2021-05-14 RX ADMIN — Medication 400 MILLIGRAM(S): at 21:46

## 2021-05-14 RX ADMIN — HEPARIN SODIUM 5000 UNIT(S): 5000 INJECTION INTRAVENOUS; SUBCUTANEOUS at 09:57

## 2021-05-14 RX ADMIN — PANTOPRAZOLE SODIUM 40 MILLIGRAM(S): 20 TABLET, DELAYED RELEASE ORAL at 17:58

## 2021-05-14 RX ADMIN — Medication 30 MILLIGRAM(S): at 21:45

## 2021-05-14 RX ADMIN — HYDROMORPHONE HYDROCHLORIDE 0.5 MILLIGRAM(S): 2 INJECTION INTRAMUSCULAR; INTRAVENOUS; SUBCUTANEOUS at 18:32

## 2021-05-14 RX ADMIN — ENOXAPARIN SODIUM 40 MILLIGRAM(S): 100 INJECTION SUBCUTANEOUS at 21:45

## 2021-05-14 RX ADMIN — MORPHINE SULFATE 4 MILLIGRAM(S): 50 CAPSULE, EXTENDED RELEASE ORAL at 22:34

## 2021-05-14 RX ADMIN — APREPITANT 80 MILLIGRAM(S): 80 CAPSULE ORAL at 09:58

## 2021-05-14 RX ADMIN — Medication 30 MILLIGRAM(S): at 21:46

## 2021-05-14 RX ADMIN — Medication 1000 MILLIGRAM(S): at 21:46

## 2021-05-14 RX ADMIN — SODIUM CHLORIDE 150 MILLILITER(S): 9 INJECTION, SOLUTION INTRAVENOUS at 18:15

## 2021-05-14 RX ADMIN — HYDROMORPHONE HYDROCHLORIDE 0.5 MILLIGRAM(S): 2 INJECTION INTRAMUSCULAR; INTRAVENOUS; SUBCUTANEOUS at 18:02

## 2021-05-14 NOTE — BRIEF OPERATIVE NOTE - OPERATION/FINDINGS
Patient underwent laparoscopic re-sleeve gastrectomy over 38 [place] bougie, modified duodenal switch (Sips, Common channel 300cm), & primary repair of hiatal hernia without any complications. Intraoperative EGD confirmed intact nonbleeding staple line with negative leak test.

## 2021-05-14 NOTE — BRIEF OPERATIVE NOTE - NSICDXBRIEFPROCEDURE_GEN_ALL_CORE_FT
PROCEDURES:  Duodenal switch, loop, laparoscopic 14-May-2021 16:00:27  [facility]  Laparoscopic hiatal herniorrhaphy 14-May-2021 16:00:44  [facility]  Bilateral injection of anesthetic agent into tissue plane defined by transversus abdominis muscle 14-May-2021 16:00:47  [facility]  EGD 14-May-2021 16:00:51  [facility]

## 2021-05-14 NOTE — BRIEF OPERATIVE NOTE - NSICDXBRIEFPOSTOP_GEN_ALL_CORE_FT
POST-OP DIAGNOSIS:  Morbid obesity 14-May-2021 16:01:07  [facility]  Hiatal hernia 14-May-2021 16:01:11  [facility]

## 2021-05-15 LAB
ANION GAP SERPL CALC-SCNC: 9 MMOL/L — SIGNIFICANT CHANGE UP (ref 5–17)
BASOPHILS # BLD AUTO: 0.02 K/UL — SIGNIFICANT CHANGE UP (ref 0–0.2)
BASOPHILS NFR BLD AUTO: 0.2 % — SIGNIFICANT CHANGE UP (ref 0–2)
BUN SERPL-MCNC: 9 MG/DL — SIGNIFICANT CHANGE UP (ref 7–23)
CALCIUM SERPL-MCNC: 9 MG/DL — SIGNIFICANT CHANGE UP (ref 8.5–10.1)
CHLORIDE SERPL-SCNC: 106 MMOL/L — SIGNIFICANT CHANGE UP (ref 96–108)
CO2 SERPL-SCNC: 22 MMOL/L — SIGNIFICANT CHANGE UP (ref 22–31)
COVID-19 SPIKE DOMAIN AB INTERP: POSITIVE
COVID-19 SPIKE DOMAIN ANTIBODY RESULT: >250 U/ML — HIGH
CREAT SERPL-MCNC: 0.62 MG/DL — SIGNIFICANT CHANGE UP (ref 0.5–1.3)
EOSINOPHIL # BLD AUTO: 0 K/UL — SIGNIFICANT CHANGE UP (ref 0–0.5)
EOSINOPHIL NFR BLD AUTO: 0 % — SIGNIFICANT CHANGE UP (ref 0–6)
GLUCOSE SERPL-MCNC: 97 MG/DL — SIGNIFICANT CHANGE UP (ref 70–99)
HCT VFR BLD CALC: 35.9 % — SIGNIFICANT CHANGE UP (ref 34.5–45)
HGB BLD-MCNC: 10.7 G/DL — LOW (ref 11.5–15.5)
IMM GRANULOCYTES NFR BLD AUTO: 0.5 % — SIGNIFICANT CHANGE UP (ref 0–1.5)
LYMPHOCYTES # BLD AUTO: 0.83 K/UL — LOW (ref 1–3.3)
LYMPHOCYTES # BLD AUTO: 6.6 % — LOW (ref 13–44)
MCHC RBC-ENTMCNC: 23.6 PG — LOW (ref 27–34)
MCHC RBC-ENTMCNC: 29.8 GM/DL — LOW (ref 32–36)
MCV RBC AUTO: 79.2 FL — LOW (ref 80–100)
MONOCYTES # BLD AUTO: 0.66 K/UL — SIGNIFICANT CHANGE UP (ref 0–0.9)
MONOCYTES NFR BLD AUTO: 5.3 % — SIGNIFICANT CHANGE UP (ref 2–14)
NEUTROPHILS # BLD AUTO: 10.92 K/UL — HIGH (ref 1.8–7.4)
NEUTROPHILS NFR BLD AUTO: 87.4 % — HIGH (ref 43–77)
PLATELET # BLD AUTO: 357 K/UL — SIGNIFICANT CHANGE UP (ref 150–400)
POTASSIUM SERPL-MCNC: 4.2 MMOL/L — SIGNIFICANT CHANGE UP (ref 3.5–5.3)
POTASSIUM SERPL-SCNC: 4.2 MMOL/L — SIGNIFICANT CHANGE UP (ref 3.5–5.3)
RBC # BLD: 4.53 M/UL — SIGNIFICANT CHANGE UP (ref 3.8–5.2)
RBC # FLD: 15.9 % — HIGH (ref 10.3–14.5)
SARS-COV-2 IGG+IGM SERPL QL IA: >250 U/ML — HIGH
SARS-COV-2 IGG+IGM SERPL QL IA: POSITIVE
SODIUM SERPL-SCNC: 137 MMOL/L — SIGNIFICANT CHANGE UP (ref 135–145)
WBC # BLD: 12.49 K/UL — HIGH (ref 3.8–10.5)
WBC # FLD AUTO: 12.49 K/UL — HIGH (ref 3.8–10.5)

## 2021-05-15 PROCEDURE — 99221 1ST HOSP IP/OBS SF/LOW 40: CPT

## 2021-05-15 RX ORDER — HYDROMORPHONE HYDROCHLORIDE 2 MG/ML
1 INJECTION INTRAMUSCULAR; INTRAVENOUS; SUBCUTANEOUS ONCE
Refills: 0 | Status: DISCONTINUED | OUTPATIENT
Start: 2021-05-15 | End: 2021-05-15

## 2021-05-15 RX ORDER — HYDROMORPHONE HYDROCHLORIDE 2 MG/ML
0.5 INJECTION INTRAMUSCULAR; INTRAVENOUS; SUBCUTANEOUS EVERY 4 HOURS
Refills: 0 | Status: DISCONTINUED | OUTPATIENT
Start: 2021-05-15 | End: 2021-05-16

## 2021-05-15 RX ORDER — ACETAMINOPHEN 500 MG
1000 TABLET ORAL ONCE
Refills: 0 | Status: COMPLETED | OUTPATIENT
Start: 2021-05-15 | End: 2021-05-15

## 2021-05-15 RX ORDER — PROCHLORPERAZINE MALEATE 5 MG
10 TABLET ORAL EVERY 8 HOURS
Refills: 0 | Status: COMPLETED | OUTPATIENT
Start: 2021-05-15 | End: 2021-05-16

## 2021-05-15 RX ORDER — PROCHLORPERAZINE MALEATE 5 MG
10 TABLET ORAL ONCE
Refills: 0 | Status: COMPLETED | OUTPATIENT
Start: 2021-05-15 | End: 2021-05-15

## 2021-05-15 RX ORDER — ENOXAPARIN SODIUM 100 MG/ML
40 INJECTION SUBCUTANEOUS
Qty: 28 | Refills: 1
Start: 2021-05-15 | End: 2021-06-11

## 2021-05-15 RX ORDER — PROCHLORPERAZINE MALEATE 5 MG
10 TABLET ORAL EVERY 8 HOURS
Refills: 0 | Status: DISCONTINUED | OUTPATIENT
Start: 2021-05-15 | End: 2021-05-15

## 2021-05-15 RX ADMIN — ENOXAPARIN SODIUM 40 MILLIGRAM(S): 100 INJECTION SUBCUTANEOUS at 10:03

## 2021-05-15 RX ADMIN — Medication 30 MILLIGRAM(S): at 03:48

## 2021-05-15 RX ADMIN — Medication 30 MILLIGRAM(S): at 22:43

## 2021-05-15 RX ADMIN — OXYCODONE HYDROCHLORIDE 10 MILLIGRAM(S): 5 TABLET ORAL at 07:48

## 2021-05-15 RX ADMIN — ONDANSETRON 4 MILLIGRAM(S): 8 TABLET, FILM COATED ORAL at 18:15

## 2021-05-15 RX ADMIN — Medication 0.5 MILLIGRAM(S): at 06:59

## 2021-05-15 RX ADMIN — ONDANSETRON 4 MILLIGRAM(S): 8 TABLET, FILM COATED ORAL at 00:01

## 2021-05-15 RX ADMIN — Medication 10 MILLIGRAM(S): at 12:17

## 2021-05-15 RX ADMIN — Medication 10 MILLIGRAM(S): at 21:28

## 2021-05-15 RX ADMIN — HYDROMORPHONE HYDROCHLORIDE 1 MILLIGRAM(S): 2 INJECTION INTRAMUSCULAR; INTRAVENOUS; SUBCUTANEOUS at 00:43

## 2021-05-15 RX ADMIN — Medication 1000 MILLIGRAM(S): at 17:00

## 2021-05-15 RX ADMIN — HYDROMORPHONE HYDROCHLORIDE 1 MILLIGRAM(S): 2 INJECTION INTRAMUSCULAR; INTRAVENOUS; SUBCUTANEOUS at 00:23

## 2021-05-15 RX ADMIN — Medication 400 MILLIGRAM(S): at 09:56

## 2021-05-15 RX ADMIN — OXYCODONE HYDROCHLORIDE 10 MILLIGRAM(S): 5 TABLET ORAL at 08:20

## 2021-05-15 RX ADMIN — Medication 30 MILLIGRAM(S): at 17:10

## 2021-05-15 RX ADMIN — HYDROMORPHONE HYDROCHLORIDE 0.5 MILLIGRAM(S): 2 INJECTION INTRAMUSCULAR; INTRAVENOUS; SUBCUTANEOUS at 10:45

## 2021-05-15 RX ADMIN — Medication 30 MILLIGRAM(S): at 09:56

## 2021-05-15 RX ADMIN — Medication 400 MILLIGRAM(S): at 16:41

## 2021-05-15 RX ADMIN — ENOXAPARIN SODIUM 40 MILLIGRAM(S): 100 INJECTION SUBCUTANEOUS at 21:28

## 2021-05-15 RX ADMIN — Medication 0.5 MILLIGRAM(S): at 21:39

## 2021-05-15 RX ADMIN — Medication 400 MILLIGRAM(S): at 03:48

## 2021-05-15 RX ADMIN — Medication 400 MILLIGRAM(S): at 22:43

## 2021-05-15 RX ADMIN — Medication 0.5 MILLIGRAM(S): at 00:13

## 2021-05-15 RX ADMIN — OXYCODONE HYDROCHLORIDE 10 MILLIGRAM(S): 5 TABLET ORAL at 18:10

## 2021-05-15 RX ADMIN — OXYCODONE HYDROCHLORIDE 10 MILLIGRAM(S): 5 TABLET ORAL at 04:40

## 2021-05-15 RX ADMIN — Medication 1000 MILLIGRAM(S): at 09:56

## 2021-05-15 RX ADMIN — Medication 1000 MILLIGRAM(S): at 03:49

## 2021-05-15 RX ADMIN — PANTOPRAZOLE SODIUM 40 MILLIGRAM(S): 20 TABLET, DELAYED RELEASE ORAL at 16:42

## 2021-05-15 RX ADMIN — Medication 30 MILLIGRAM(S): at 10:00

## 2021-05-15 RX ADMIN — Medication 30 MILLIGRAM(S): at 16:42

## 2021-05-15 RX ADMIN — Medication 30 MILLIGRAM(S): at 03:49

## 2021-05-15 RX ADMIN — OXYCODONE HYDROCHLORIDE 10 MILLIGRAM(S): 5 TABLET ORAL at 03:48

## 2021-05-15 RX ADMIN — Medication 10 MILLIGRAM(S): at 03:48

## 2021-05-15 RX ADMIN — HYDROMORPHONE HYDROCHLORIDE 0.5 MILLIGRAM(S): 2 INJECTION INTRAMUSCULAR; INTRAVENOUS; SUBCUTANEOUS at 22:43

## 2021-05-15 RX ADMIN — HYDROMORPHONE HYDROCHLORIDE 0.5 MILLIGRAM(S): 2 INJECTION INTRAMUSCULAR; INTRAVENOUS; SUBCUTANEOUS at 10:25

## 2021-05-15 RX ADMIN — OXYCODONE HYDROCHLORIDE 10 MILLIGRAM(S): 5 TABLET ORAL at 19:25

## 2021-05-15 RX ADMIN — ONDANSETRON 4 MILLIGRAM(S): 8 TABLET, FILM COATED ORAL at 07:00

## 2021-05-15 RX ADMIN — Medication 30 MILLIGRAM(S): at 22:44

## 2021-05-15 RX ADMIN — HYDROMORPHONE HYDROCHLORIDE 0.5 MILLIGRAM(S): 2 INJECTION INTRAMUSCULAR; INTRAVENOUS; SUBCUTANEOUS at 23:03

## 2021-05-15 RX ADMIN — Medication 1000 MILLIGRAM(S): at 22:44

## 2021-05-15 NOTE — CONSULT NOTE ADULT - ASSESSMENT
A: 39 yo female with h/o Morbid obesity, recent Gastric sleeve and hiatal hernia repair, H/O idiopathic PE's 2015, was on Xarelto x 5 years, d/c'd in 1/2020, had negative hypercoag workup.  We are consulted for DVT/PE prophylaxis, risk stratification and Anticoagulation Management    P: lovenox 40mg sq every 12 hours for 2-4 weeks depending on Mobility    will send script to pt's pharmacy    cbc/bmp in am    amb encouraged     thank you for the consult, will follow

## 2021-05-15 NOTE — CONSULT NOTE ADULT - SUBJECTIVE AND OBJECTIVE BOX
HPI: This is a 41 yo female with pmh morbid obesity, anxiety, asthma intubated x 4 times lastly in , GERD vertigo and Pulmonary embolis 6 years ago. 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 she denies trauma or injury, denied traveling , denies BCP's or recent pregnacny prior, denies family history other VTE other than a great grandmother having a CVA.  She denies smoking,  She had a hypercoagulable workup that was negative for any abnormal clotting factors both familial or acquired. She was on Xarelto x 5 years and it was discontinued per her hematologist in 2020.  She is now on 2 north S/P gastric Sleeve and hiatal hernia repair.  We are consulted by Dr Kay for anticoagulation recommendations and management       PAST MEDICAL : as above   SURGICAL HISTORY:Status post laparoscopic sleeve gastrectomy  , H/O hiatal hernia,  during gastric sleeve surgery,H/O rectal polypectomy,  benign  Social: denies smoking, ETOH or rec drugs      FAMILY HISTORY:  Family history of colon cancer (Grandparent)  maternal grandfather who passed away    Family history of colon cancer (Uncle)  maternal uncle passed away at age 57    Allergies: pseudoephedrine (Other)  Reglan (Other)  sulfa drugs (Hives; Anaphylaxis)            CrCl: 257.44    Caprini VTE Risk Score: CAPRINI SCORE  AGE RELATED RISK FACTORS                                                       MOBILITY RELATED FACTORS  [ ] Age 41-60 years                                            (1 Point)                  [ ] Bed rest                                                        (1 Point)  [ ] Age: 61-74 years                                           (2 Points)                [ ] Plaster cast                                                   (2 Points)  [ ] Age= 75 years                                              (3 Points)                 [ ] Bed bound for more than 72 hours                   (2 Points)    DISEASE RELATED RISK FACTORS                                               GENDER SPECIFIC FACTORS  [ ] Edema in the lower extremities                       (1 Point)           [ ] Pregnancy                                                            (1 Point)  [ ] Varicose veins                                               (1 Point)                  [ ] Post-partum < 6 weeks                                      (1 Point)             [x ] BMI > 25 Kg/m2                                            (1 Point)                  [ ] Hormonal therapy or oral contraception       (1 Point)                 [ ] Sepsis (in the previous month)                        (1 Point)             [ ] History of pregnancy complications                (1Point)  [ ] Pneumonia or serious lung disease                                             [ ] Unexplained or recurrent  (>/= 3), premature                                 (In the previous month)                               (1 Point)                birth with toxemia or growth-restricted infant (1 Point)  [ ] Abnormal pulmonary function test            (1 Point)                                   SURGERY RELATED RISK FACTORS  [ ] Acute myocardial infarction                       (1 Point)                  [ ]  Section                                         (1 Point)  [ ] Congestive heart failure (in the previous month) (1 Point)   [ ] Minor surgery   lasting <45 minutes       (1 Point)   [ ] Inflammatory bowel disease                             (1 Point)          [ ] Arthroscopic surgery                                  (2 Points)  [ ] Central venous access                                    (2 Points)            [x ] General surgery lasting >45 minutes      (2 Points)       [ ] Stroke (in the previous month)                  (5 Points)            [ ] Elective major lower extremity arthroplasty (5 Points)                                   [  ] Malignancy (present or past include skin melanoma                                          but exclude  basal skin cell)    (2 points)                                      TRAUMA RELATED RISK FACTORS                HEMATOLOGY RELATED FACTORS                                  [ ] Fracture of the hip, pelvis, or leg                       (5 Points)  [x ] Prior episodes of VTE                                     (3 Points)          [ ] Acute spinal cord injury (in the previous month)  (5 Points)  [ ] Positive family history for VTE                         (3 Points)       [ ] Paralysis (less than 1 month)                          (5 Points)  [ ] Prothrombin 99517 A                                      (3 Points)         [ ] Multiple Trauma (within 1month)                 (5Points)                                                                                                                                                                [ ] Factor V Leiden                                          (3 Points)                                OTHER RISK FACTORS                          [ ] Lupus anticoagulants                                     (3 Points)                       [ x] BMI > 40                          (1 Point)                                                         [ ] Anticardiolipin antibodies                                (3 Points)                   [ ] Smoking                              (1Point)                                                [ ] High homocysteine in the blood                      (3 Points)                [  ] Diabetes requiring insulin (1point)                         [ ] Other congenital or acquired thrombophilia       (3 Points)          [  ] Chemotherapy                   (1 Point)  [ ] Heparin induced thrombocytopenia                  (3 Points)             [  ] Blood Transfusion                (1 point)                                                                                                             Total Score [  7        ]                                                                                                                                                                    IMPROVE Bleeding Risk Score:  1.5      Falls Risk:   High (  )  Mod (  x)  Low (  )        REVIEW OF SYSTEMS    (  )Fever	     (  )Constipation	(  )SOB				(  )Headache	(  )Dysuria  (  )Chills	     (  )Melena	(  )Dyspnea present on exertion	                    (  )Dizziness                    (  )Polyuria  (  )Nausea	     (  )Hematochezia	(  )Cough			                    (  )Syncope   	(  )Hematuria  (  )Vomiting    (  )Chest Pain	(  )Wheezing			(  )Weakness  (  )Diarrhea     (  )Palpitations	(  )Anorexia			(  )joint pain      (X) abd pain    All  other review of systems negative: Yes    Vital Signs Last 24 Hrs  T(C): 36.7 (05-15-21 @ 10:30), Max: 36.9 (21 @ 17:40)  T(F): 98.1 (05-15-21 @ 10:30), Max: 98.5 (21 @ 17:40)  HR: 84 (05-15-21 @ 10:30) (70 - 91)  BP: 128/78 (05-15-21 @ 10:30) (98/73 - 138/90)  BP(mean): --  RR: 18 (05-15-21 @ 10:30) (13 - 20)  SpO2: 95% (05-15-21 @ 10:30) (94% - 99%)      PHYSICAL EXAM:    Constitutional: Appears Well    Neurological: A& O x 3    Skin: Warm    Respiratory and Thorax: normal effort; Breath sounds: normal; No rales/wheezing/rhonchi  	  Cardiovascular: S1, S2, regular, NMBR	    Gastrointestinal: BS + x 4Q, appropriate post op tenderness, lab sites intact    Genitourinary:  Bladder nondistended, nontender    Musculoskeletal:   General Right:   no muscle/joint tenderness,   normal tone, no joint swelling,   ROM: full	    General Left:   no muscle/joint tenderness,   normal tone, no joint swelling,   ROM: full      Lower extrems:   Right: no calf tenderness              negative alia's sign               + pedal pulses    Left:   no calf tenderness              negative alia's sign               + pedal pulses  Labs:                         10.7   12.49 )-----------( 357      ( 15 May 2021 07:35 )             35.9       -15    137  |  106  |  9   ----------------------------<  97  4.2   |  22  |  0.62    Ca    9.0      15 May 2021 07:35        				    MEDICATIONS  (STANDING):  acetaminophen  IVPB .. 1000 milliGRAM(s) IV Intermittent once  acetaminophen  IVPB .. 1000 milliGRAM(s) IV Intermittent once  dextrose 40% Gel 15 Gram(s) Oral once  dextrose 5%. 1000 milliLiter(s) IV Continuous <Continuous>  dextrose 50% Injectable 25 Gram(s) IV Push once  enoxaparin Injectable 40 milliGRAM(s) SubCutaneous every 12 hours  glucagon  Injectable 1 milliGRAM(s) IntraMuscular once  insulin lispro (ADMELOG) corrective regimen sliding scale   SubCutaneous every 6 hours  lactated ringers. 1000 milliLiter(s) IV Continuous <Continuous>  pantoprazole  Injectable 40 milliGRAM(s) IV Push every 24 hours  prochlorperazine   Injectable 10 milliGRAM(s) IV Push every 8 hours          DVT Prophylaxis:  LMWH                   ( x )  Heparin SQ           (  )  Coumadin             (  )  Xarelto                  (  )  Eliquis                   (  )  Venodynes           (  )  Ambulation          (x  )  UFH                       (  )  Contraindicated  (  )  EC ASPIRIN       (  )

## 2021-05-16 ENCOUNTER — TRANSCRIPTION ENCOUNTER (OUTPATIENT)
Age: 41
End: 2021-05-16

## 2021-05-16 VITALS
TEMPERATURE: 98 F | SYSTOLIC BLOOD PRESSURE: 115 MMHG | OXYGEN SATURATION: 99 % | RESPIRATION RATE: 18 BRPM | DIASTOLIC BLOOD PRESSURE: 82 MMHG | HEART RATE: 95 BPM

## 2021-05-16 DIAGNOSIS — E66.9 OBESITY, UNSPECIFIED: ICD-10-CM

## 2021-05-16 LAB
ANION GAP SERPL CALC-SCNC: 5 MMOL/L — SIGNIFICANT CHANGE UP (ref 5–17)
BUN SERPL-MCNC: 8 MG/DL — SIGNIFICANT CHANGE UP (ref 7–23)
CALCIUM SERPL-MCNC: 8.9 MG/DL — SIGNIFICANT CHANGE UP (ref 8.5–10.1)
CHLORIDE SERPL-SCNC: 109 MMOL/L — HIGH (ref 96–108)
CO2 SERPL-SCNC: 25 MMOL/L — SIGNIFICANT CHANGE UP (ref 22–31)
CREAT SERPL-MCNC: 0.53 MG/DL — SIGNIFICANT CHANGE UP (ref 0.5–1.3)
GLUCOSE SERPL-MCNC: 78 MG/DL — SIGNIFICANT CHANGE UP (ref 70–99)
HCT VFR BLD CALC: 32.3 % — LOW (ref 34.5–45)
HGB BLD-MCNC: 9.8 G/DL — LOW (ref 11.5–15.5)
MCHC RBC-ENTMCNC: 23.8 PG — LOW (ref 27–34)
MCHC RBC-ENTMCNC: 30.3 GM/DL — LOW (ref 32–36)
MCV RBC AUTO: 78.6 FL — LOW (ref 80–100)
PLATELET # BLD AUTO: 356 K/UL — SIGNIFICANT CHANGE UP (ref 150–400)
POTASSIUM SERPL-MCNC: 4.1 MMOL/L — SIGNIFICANT CHANGE UP (ref 3.5–5.3)
POTASSIUM SERPL-SCNC: 4.1 MMOL/L — SIGNIFICANT CHANGE UP (ref 3.5–5.3)
RBC # BLD: 4.11 M/UL — SIGNIFICANT CHANGE UP (ref 3.8–5.2)
RBC # FLD: 16 % — HIGH (ref 10.3–14.5)
SODIUM SERPL-SCNC: 139 MMOL/L — SIGNIFICANT CHANGE UP (ref 135–145)
WBC # BLD: 11.34 K/UL — HIGH (ref 3.8–10.5)
WBC # FLD AUTO: 11.34 K/UL — HIGH (ref 3.8–10.5)

## 2021-05-16 PROCEDURE — 99231 SBSQ HOSP IP/OBS SF/LOW 25: CPT

## 2021-05-16 RX ORDER — LIRAGLUTIDE 6 MG/ML
1 INJECTION SUBCUTANEOUS
Qty: 0 | Refills: 0 | DISCHARGE

## 2021-05-16 RX ORDER — ACETAMINOPHEN 500 MG
1000 TABLET ORAL ONCE
Refills: 0 | Status: COMPLETED | OUTPATIENT
Start: 2021-05-16 | End: 2021-05-16

## 2021-05-16 RX ORDER — OMEPRAZOLE 10 MG/1
1 CAPSULE, DELAYED RELEASE ORAL
Qty: 0 | Refills: 0 | DISCHARGE

## 2021-05-16 RX ORDER — ALPRAZOLAM 0.25 MG
0.5 TABLET ORAL
Qty: 0 | Refills: 0 | DISCHARGE

## 2021-05-16 RX ADMIN — Medication 10 MILLIGRAM(S): at 05:34

## 2021-05-16 RX ADMIN — Medication 30 MILLIGRAM(S): at 05:33

## 2021-05-16 RX ADMIN — Medication 400 MILLIGRAM(S): at 05:33

## 2021-05-16 RX ADMIN — HYDROMORPHONE HYDROCHLORIDE 0.5 MILLIGRAM(S): 2 INJECTION INTRAMUSCULAR; INTRAVENOUS; SUBCUTANEOUS at 05:45

## 2021-05-16 RX ADMIN — HYDROMORPHONE HYDROCHLORIDE 0.5 MILLIGRAM(S): 2 INJECTION INTRAMUSCULAR; INTRAVENOUS; SUBCUTANEOUS at 06:05

## 2021-05-16 RX ADMIN — ONDANSETRON 4 MILLIGRAM(S): 8 TABLET, FILM COATED ORAL at 01:09

## 2021-05-16 RX ADMIN — Medication 30 MILLIGRAM(S): at 05:34

## 2021-05-16 RX ADMIN — ENOXAPARIN SODIUM 40 MILLIGRAM(S): 100 INJECTION SUBCUTANEOUS at 10:29

## 2021-05-16 RX ADMIN — Medication 1000 MILLIGRAM(S): at 05:34

## 2021-05-16 NOTE — PROGRESS NOTE ADULT - PROBLEM SELECTOR PLAN 1
The patient is s/p lap re-sleeve gastrectomy with modified DS and doing very well.  All discharge instructions were given to the patient, as well as potential signs of complications.  The patient will follow up in 2 weeks.  [facility]

## 2021-05-16 NOTE — DISCHARGE NOTE PROVIDER - NSDCMRMEDTOKEN_GEN_ALL_CORE_FT
Arnuity Ellipta 100 mcg inhalation powder: 1 puff(s) inhaled every 24 hours  enoxaparin 40 mg/0.4 mL injectable solution: 40 milligram(s) subcutaneously every 12 hours

## 2021-05-16 NOTE — PROGRESS NOTE ADULT - ASSESSMENT
The patient is status post laparoscopic sleeve gastrectomy and YANCI and doing very well.    The patient will have an upper G.I. series this morning, if it shows no leak or stricture, will start gastric bypass clears.  Ambulation.  Pain control.  Incentive spirometer.
Patient is an 39 yo F s/p Laparoscopic Re-Sleeve Gastrectomy with modified DS
A: 41 yo female with h/o Morbid obesity, recent Gastric sleeve and hiatal hernia repair, H/O idiopathic PE's 2015, was on Xarelto x 5 years, d/c'd in 1/2020, had negative hypercoag workup.  We are consulted for DVT/PE prophylaxis, risk stratification and Anticoagulation Management    P: pt going home   cont lovenox 40mg sq every 12 hours for 2-4 weeks depending on Mobility  sent script to [facility] pharmacy, notified they will not have in stock until tomorrow, our pharmacy to dispense two syringes to pt, pt is independent injection technique    will set up home lab for CBC/BMP this week, with follow up by our service.  Will cont to f/u with pt weekly via phone calls while on Loven, D/W pt and she is aware    dispo; home today

## 2021-05-16 NOTE — DISCHARGE NOTE PROVIDER - CARE PROVIDER_API CALL
James Kay)  Surgery  [address], [address]  [place] [address]  Phone: [phone]  Fax: [phone]  Follow Up Time:

## 2021-05-16 NOTE — DISCHARGE NOTE PROVIDER - HOSPITAL COURSE
Patient is an  41 yo F that underwent laparoscopic Re-sleeve gastrectomy with modified duodenal switch without any complications. Patient is currently doing very well, pain controlled, and is tolerating phase I bariatric diet. Patient has had uncomplicated hospital course and is stable for discharge home with follow-up in the office in 2 weeks.

## 2021-05-16 NOTE — DISCHARGE NOTE PROVIDER - NSDCFUSCHEDAPPT_GEN_ALL_CORE_FT
ARTURO JOSEPH ; 06/04/2021 ; [facility] [address] ; 06/14/2021 ; [facility] [place] [facility] [address] ARTURO Merchant ; 06/15/2021 ; [facility] [place] [facility] [address] [facility]  ARTURO JOSEPH ; 06/22/2021 ; [facility] [place] [facility] [address] [facility]

## 2021-05-16 NOTE — PROGRESS NOTE ADULT - SUBJECTIVE AND OBJECTIVE BOX
Post Op Day#: 1  Procedure:  Laparoscopic RESleeve Gastrectomy and YANCI    The patient is doing well without complaints.    Vital Signs Last 24 Hrs  T(C): 36.7 (15 May 2021 05:00), Max: 36.9 (14 May 2021 17:40)  T(F): 98 (15 May 2021 05:00), Max: 98.5 (14 May 2021 17:40)  HR: 91 (15 May 2021 05:00) (68 - 91)  BP: 123/76 (15 May 2021 05:00) (98/73 - 138/90)  BP(mean): --  RR: 16 (15 May 2021 05:00) (13 - 20)  SpO2: 94% (15 May 2021 05:00) (94% - 99%)    PHYSICAL EXAM:  General: NAD.  HEENT: no JVD, no jaundice.  LUNGS: CTAB.  Heart: S1 S2 RRR  Abd: soft nt/nd   Wounds: clean dry and intact                          10.7   12.49 )-----------( 357      ( 15 May 2021 07:35 )             35.9       05-15    137  |  106  |  9   ----------------------------<  97  4.2   |  22  |  0.62    Ca    9.0      15 May 2021 07:35          
Post Op Day#: 2  Procedure:  Laparoscopic Re-Sleeve Gastrectomy with modified DS    The patient is doing well without complaints.    Vital Signs Last 24 Hrs  T(C): 37.1 (16 May 2021 00:28), Max: 37.2 (15 May 2021 21:46)  T(F): 98.7 (16 May 2021 00:28), Max: 99 (15 May 2021 21:46)  HR: 88 (16 May 2021 00:28) (84 - 88)  BP: 114/74 (16 May 2021 00:28) (114/74 - 138/91)  BP(mean): --  RR: 18 (16 May 2021 00:28) (18 - 18)  SpO2: 98% (16 May 2021 00:28) (95% - 100%)    PHYSICAL EXAM:  General: NAD.  HEENT: no JVD, no jaundice.  LUNGS: CTAB.  Heart: S1 S2 RRR  Abd: soft nt/nd   Wounds: clean dry and intact                          9.8    11.34 )-----------( 356      ( 16 May 2021 07:12 )             32.3       05-16    139  |  109<H>  |  8   ----------------------------<  78  4.1   |  25  |  0.53    Ca    8.9      16 May 2021 07:12          
  HPI: This is a 41 yo female with pmh morbid obesity, anxiety, asthma intubated x 4 times lastly in , GERD vertigo and Pulmonary embolis 6 years ago. 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 she denies trauma or injury, denied traveling , denies BCP's or recent pregnacny prior, denies family history other VTE other than a great grandmother having a CVA.  She denies smoking,  She had a hypercoagulable workup that was negative for any abnormal clotting factors both familial or acquired. She was on Xarelto x 5 years and it was discontinued per her hematologist in 2020.  She is now on 2 north S/P gastric Sleeve and hiatal hernia repair.  We are consulted by Dr Kay for anticoagulation recommendations and management         CrCl: 257.44    Caprini VTE Risk Score: CAPRINI SCORE  AGE RELATED RISK FACTORS                                                       MOBILITY RELATED FACTORS  [ ] Age 41-60 years                                            (1 Point)                  [ ] Bed rest                                                        (1 Point)  [ ] Age: 61-74 years                                           (2 Points)                [ ] Plaster cast                                                   (2 Points)  [ ] Age= 75 years                                              (3 Points)                 [ ] Bed bound for more than 72 hours                   (2 Points)    DISEASE RELATED RISK FACTORS                                               GENDER SPECIFIC FACTORS  [ ] Edema in the lower extremities                       (1 Point)           [ ] Pregnancy                                                            (1 Point)  [ ] Varicose veins                                               (1 Point)                  [ ] Post-partum < 6 weeks                                      (1 Point)             [x ] BMI > 25 Kg/m2                                            (1 Point)                  [ ] Hormonal therapy or oral contraception       (1 Point)                 [ ] Sepsis (in the previous month)                        (1 Point)             [ ] History of pregnancy complications                (1Point)  [ ] Pneumonia or serious lung disease                                             [ ] Unexplained or recurrent  (>/= 3), premature                                 (In the previous month)                               (1 Point)                birth with toxemia or growth-restricted infant (1 Point)  [ ] Abnormal pulmonary function test            (1 Point)                                   SURGERY RELATED RISK FACTORS  [ ] Acute myocardial infarction                       (1 Point)                  [ ]  Section                                         (1 Point)  [ ] Congestive heart failure (in the previous month) (1 Point)   [ ] Minor surgery   lasting <45 minutes       (1 Point)   [ ] Inflammatory bowel disease                             (1 Point)          [ ] Arthroscopic surgery                                  (2 Points)  [ ] Central venous access                                    (2 Points)            [x ] General surgery lasting >45 minutes      (2 Points)       [ ] Stroke (in the previous month)                  (5 Points)            [ ] Elective major lower extremity arthroplasty (5 Points)                                   [  ] Malignancy (present or past include skin melanoma                                          but exclude  basal skin cell)    (2 points)                                      TRAUMA RELATED RISK FACTORS                HEMATOLOGY RELATED FACTORS                                  [ ] Fracture of the hip, pelvis, or leg                       (5 Points)  [x ] Prior episodes of VTE                                     (3 Points)          [ ] Acute spinal cord injury (in the previous month)  (5 Points)  [ ] Positive family history for VTE                         (3 Points)       [ ] Paralysis (less than 1 month)                          (5 Points)  [ ] Prothrombin 20532 A                                      (3 Points)         [ ] Multiple Trauma (within 1month)                 (5Points)                                                                                                                                                                [ ] Factor V Leiden                                          (3 Points)                                OTHER RISK FACTORS                          [ ] Lupus anticoagulants                                     (3 Points)                       [ x] BMI > 40                          (1 Point)                                                         [ ] Anticardiolipin antibodies                                (3 Points)                   [ ] Smoking                              (1Point)                                                [ ] High homocysteine in the blood                      (3 Points)                [  ] Diabetes requiring insulin (1point)                         [ ] Other congenital or acquired thrombophilia       (3 Points)          [  ] Chemotherapy                   (1 Point)  [ ] Heparin induced thrombocytopenia                  (3 Points)             [  ] Blood Transfusion                (1 point)                                                                                                             Total Score [  7        ]                                                                                                                                                                    IMPROVE Bleeding Risk Score:  1.5      Falls Risk:   High (  )  Mod (  x)  Low (  )        REVIEW OF SYSTEMS    (  )Fever	     (  )Constipation	(  )SOB				(  )Headache	(  )Dysuria  (  )Chills	     (  )Melena	(  )Dyspnea present on exertion	                    (  )Dizziness                    (  )Polyuria  (  )Nausea	     (  )Hematochezia	(  )Cough			                    (  )Syncope   	(  )Hematuria  (  )Vomiting    (  )Chest Pain	(  )Wheezing			(  )Weakness  (  )Diarrhea     (  )Palpitations	(  )Anorexia			(  )joint pain      (X) abd pain    All  other review of systems negative: Yes    Vital Signs Last 24 Hrs  T(C): 36.7 (21 @ 09:11), Max: 37.2 (05-15-21 @ 21:46)  T(F): 98.1 (21 @ 09:11), Max: 99 (05-15-21 @ 21:46)  HR: 95 (21 @ 09:11) (88 - 95)  BP: 115/82 (21 @ 09:11) (114/74 - 138/91)  BP(mean): --  RR: 18 (21 @ 09:11) (18 - 18)  SpO2: 99% (21 @ 09:11) (98% - 100%)    PHYSICAL EXAM:    Constitutional: Appears Well    Neurological: A& O x 3    Skin: Warm    Respiratory and Thorax: normal effort; Breath sounds: normal; No rales/wheezing/rhonchi  	  Cardiovascular: S1, S2, regular, NMBR	    Gastrointestinal: BS + x 4Q, appropriate post op tenderness, lab sites intact    Genitourinary:  Bladder nondistended, nontender    Musculoskeletal:   General Right:   no muscle/joint tenderness,   normal tone, no joint swelling,   ROM: full	    General Left:   no muscle/joint tenderness,   normal tone, no joint swelling,   ROM: full      Lower extrems:   Right: no calf tenderness              negative alia's sign               + pedal pulses    Left:   no calf tenderness              negative alia's sign               + pedal pulses  Labs:                                              9.8    11.34 )-----------( 356      ( 16 May 2021 07:12 )             32.3           139  |  109<H>  |  8   ----------------------------<  78  4.1   |  25  |  0.53    Ca    8.9      16 May 2021 07:12      MEDICATIONS  (STANDING):  acetaminophen  IVPB .. 1000 milliGRAM(s) IV Intermittent once  dextrose 40% Gel 15 Gram(s) Oral once  dextrose 5%. 1000 milliLiter(s) (50 mL/Hr) IV Continuous <Continuous>  dextrose 50% Injectable 25 Gram(s) IV Push once  enoxaparin Injectable 40 milliGRAM(s) SubCutaneous every 12 hours  glucagon  Injectable 1 milliGRAM(s) IntraMuscular once  insulin lispro (ADMELOG) corrective regimen sliding scale   SubCutaneous every 6 hours  lactated ringers. 1000 milliLiter(s) (150 mL/Hr) IV Continuous <Continuous>  pantoprazole  Injectable 40 milliGRAM(s) IV Push every 24 hours    MEDICATIONS  (PRN):  ALBUTerol    90 MICROgram(s) HFA Inhaler 2 Puff(s) Inhalation every 6 hours PRN Shortness of Breath and/or Wheezing  HYDROmorphone  Injectable 0.5 milliGRAM(s) IV Push every 4 hours PRN Severe Pain (7 - 10)  ketorolac   Injectable 30 milliGRAM(s) IV Push every 6 hours PRN Moderate Pain (4 - 6)  ketorolac   Injectable 30 milliGRAM(s) IV Push every 6 hours PRN Moderate Pain (4 - 6)  LORazepam   Injectable 0.5 milliGRAM(s) IV Push every 6 hours PRN Anxiety  ondansetron Injectable 4 milliGRAM(s) IV Push every 6 hours PRN Nausea and/or Vomiting  oxyCODONE    Solution 5 milliGRAM(s) Oral every 4 hours PRN Mild Pain (1 - 3)  oxyCODONE    Solution 10 milliGRAM(s) Oral every 4 hours PRN Moderate Pain (4 - 6)          DVT Prophylaxis:  LMWH                   ( x )  Heparin SQ           (  )  Coumadin             (  )  Xarelto                  (  )  Eliquis                   (  )  Venodynes           (  )  Ambulation          (x  )  UFH                       (  )  Contraindicated  (  )  EC ASPIRIN       (  )

## 2021-05-16 NOTE — DISCHARGE NOTE NURSING/CASE MANAGEMENT/SOCIAL WORK - PATIENT PORTAL LINK FT
You can access the FollowMyHealth Patient Portal offered by [facility] by registering at the following website: [email or web address]. By joining [employer]’s FollowMyHealth portal, you will also be able to view your health information using other applications (apps) compatible with our system.

## 2021-05-20 ENCOUNTER — LABORATORY RESULT (OUTPATIENT)
Age: 41
End: 2021-05-20

## 2021-05-25 NOTE — ED CDU PROVIDER DISPOSITION NOTE - NSFOLLOWUPCLINICS_GEN_ALL_ED_FT
[facility]  Cardiology  [address] [place] [id number]  Phone: [phone]  Fax:
Yes

## 2021-05-26 ENCOUNTER — OUTPATIENT (OUTPATIENT)
Dept: OUTPATIENT SERVICES | Facility: HOSPITAL | Age: 41
LOS: 1 days | End: 2021-05-26
Payer: COMMERCIAL

## 2021-05-26 VITALS
RESPIRATION RATE: 14 BRPM | TEMPERATURE: 98 F | DIASTOLIC BLOOD PRESSURE: 89 MMHG | OXYGEN SATURATION: 99 % | SYSTOLIC BLOOD PRESSURE: 127 MMHG | HEART RATE: 78 BPM | HEIGHT: 63 IN | WEIGHT: 290.35 LBS

## 2021-05-26 DIAGNOSIS — Z87.19 PERSONAL HISTORY OF OTHER DISEASES OF THE DIGESTIVE SYSTEM: Chronic | ICD-10-CM

## 2021-05-26 DIAGNOSIS — Z98.84 BARIATRIC SURGERY STATUS: Chronic | ICD-10-CM

## 2021-05-26 DIAGNOSIS — G56.02 CARPAL TUNNEL SYNDROME, LEFT UPPER LIMB: ICD-10-CM

## 2021-05-26 DIAGNOSIS — Z01.818 ENCOUNTER FOR OTHER PREPROCEDURAL EXAMINATION: ICD-10-CM

## 2021-05-26 DIAGNOSIS — Z98.890 OTHER SPECIFIED POSTPROCEDURAL STATES: Chronic | ICD-10-CM

## 2021-05-26 PROCEDURE — G0463: CPT

## 2021-05-26 NOTE — H&P PST ADULT - HISTORY OF PRESENT ILLNESS
41 yo female presents with 3-4 year history of known left carpal tunnel syndrome. Symptoms were intermittent and controlled with wearing a splint. In December 2020 she developed pain in the wrist which radiated up to the elbow. She received a cortisone injection in February 2021 and had 2 months of pain relief. Pain now 2-3/10 at rest and increased to 7-8/10 with use of hand. She also reports intermittent hand numbness and tingling associated with use of the hand. Currently denies using any pain relief measures.

## 2021-05-26 NOTE — H&P PST ADULT - NSICDXFAMILYHX_GEN_ALL_CORE_FT
FAMILY HISTORY:  Father  Still living? Yes, Estimated age: 61-70  Family history of hypertension, Age at diagnosis: Age Unknown  Family history of type 2 diabetes mellitus, Age at diagnosis: Age Unknown  FHx: coronary artery disease, Age at diagnosis: Age Unknown    Mother  Still living? Yes, Estimated age: 61-70  Family history of hypertension, Age at diagnosis: Age Unknown  Family history of psoriatic arthritis, Age at diagnosis: Age Unknown  Family history of Sjogren's disease, Age at diagnosis: Age Unknown  Family history of type 2 diabetes mellitus, Age at diagnosis: Age Unknown  FHx: coronary artery disease, Age at diagnosis: Age Unknown  FHx: rheumatoid arthritis, Age at diagnosis: Age Unknown    Grandparent  Still living? Unknown  Family history of colon cancer, Age at diagnosis: Age Unknown  Family history of multiple myeloma, Age at diagnosis: Age Unknown

## 2021-05-26 NOTE — H&P PST ADULT - MUSCULOSKELETAL
left hand/ROM intact/no joint swelling/no joint erythema/no joint warmth/no calf tenderness/diminished strength detailed exam details…

## 2021-05-26 NOTE — H&P PST ADULT - NSICDXPASTSURGICALHX_GEN_ALL_CORE_FT
PAST SURGICAL HISTORY:  H/O hiatal hernia 2012 during gastric sleeve surgery    H/O rectal polypectomy 2012 benign    History of weight loss surgery sleeve gastrectomy revision, YANCI procedure and hiatal hernia repair 5/14/21    Status post laparoscopic sleeve gastrectomy 2012

## 2021-05-26 NOTE — H&P PST ADULT - NSICDXPASTMEDICALHX_GEN_ALL_CORE_FT
PAST MEDICAL HISTORY:  Anxiety     Asthma intubated 4 times. Last time at age 12 (1992)    Cervical herniated disc C 5 and 6    COVID-19 vaccine series completed moderna, completed 1/27/21    GERD (gastroesophageal reflux disease)     Insomnia difficulty falling asleep since bariatric surgery May 2021    Knee dislocation, left, sequela 2010    Left carpal tunnel syndrome     Morbid obesity     Pulmonary embolism October 2018 had 5 PEs, work up negative, off anticoagulation as of 1/2020 but placed on lovenox post-op after abdominal surgery 5/14/21    Varicose veins     Vertigo, benign positional      PAST MEDICAL HISTORY:  Anxiety     Asthma intubated 4 times. Last time at age 12 (1992)    Cervical herniated disc C 5 and 6    COVID-19 vaccine series completed moderna, completed 1/27/21    GERD (gastroesophageal reflux disease)     Insomnia difficulty falling asleep since bariatric surgery May 2021    Knee dislocation, left, sequela 2010    Left carpal tunnel syndrome     Morbid obesity with BMI of 50.0-59.9, adult     Pulmonary embolism October 2018 had 5 PEs, work up negative, off anticoagulation as of 1/2020 but placed on lovenox post-op after abdominal surgery 5/14/21    Thrombocytosis     Varicose veins     Vertigo, benign positional

## 2021-05-26 NOTE — H&P PST ADULT - NSICDXPROBLEM_GEN_ALL_CORE_FT
PROBLEM DIAGNOSES  Problem: Left carpal tunnel syndrome  Assessment and Plan: left carpal tunnel release. patient was told by Dr. Horner that she did not require medical clearance. She was just clearaed by cardiology, pulmonary, PCP and hematology for 5/14/21 bariatric surgery and will obtain those clearances. blood work was recently done and will obtain results. Scheduled to follow up with hematology on 5/28/21 and will obtain that report. Instructed to check with bariatric surgeon about stopping lovenox. instructed to take omeprazole AM of surgery with sip of water and to use arnuity as prescribed. surgical wash instructions reviewed and verbalized understanding.

## 2021-05-27 ENCOUNTER — OUTPATIENT (OUTPATIENT)
Dept: OUTPATIENT SERVICES | Facility: HOSPITAL | Age: 41
LOS: 1 days | Discharge: HOME | End: 2021-05-27

## 2021-05-27 DIAGNOSIS — Z98.84 BARIATRIC SURGERY STATUS: Chronic | ICD-10-CM

## 2021-05-27 DIAGNOSIS — D68.2 HEREDITARY DEFICIENCY OF OTHER CLOTTING FACTORS: ICD-10-CM

## 2021-05-27 DIAGNOSIS — Z98.890 OTHER SPECIFIED POSTPROCEDURAL STATES: Chronic | ICD-10-CM

## 2021-05-27 DIAGNOSIS — Z87.19 PERSONAL HISTORY OF OTHER DISEASES OF THE DIGESTIVE SYSTEM: Chronic | ICD-10-CM

## 2021-05-27 PROBLEM — I83.90 ASYMPTOMATIC VARICOSE VEINS OF UNSPECIFIED LOWER EXTREMITY: Chronic | Status: ACTIVE | Noted: 2021-05-26

## 2021-05-27 PROBLEM — G56.02 CARPAL TUNNEL SYNDROME, LEFT UPPER LIMB: Chronic | Status: ACTIVE | Noted: 2021-05-26

## 2021-05-27 PROBLEM — Z92.29 PERSONAL HISTORY OF OTHER DRUG THERAPY: Chronic | Status: ACTIVE | Noted: 2021-05-26

## 2021-05-27 PROBLEM — M50.20 OTHER CERVICAL DISC DISPLACEMENT, UNSPECIFIED CERVICAL REGION: Chronic | Status: ACTIVE | Noted: 2021-05-26

## 2021-05-28 ENCOUNTER — APPOINTMENT (OUTPATIENT)
Dept: HEMATOLOGY ONCOLOGY | Facility: CLINIC | Age: 41
End: 2021-05-28
Payer: COMMERCIAL

## 2021-05-28 DIAGNOSIS — Z86.72 PERSONAL HISTORY OF THROMBOPHLEBITIS: ICD-10-CM

## 2021-05-28 PROCEDURE — 99214 OFFICE O/P EST MOD 30 MIN: CPT | Mod: 95

## 2021-05-28 RX ORDER — APIXABAN 5 MG/1
5 TABLET, FILM COATED ORAL
Qty: 60 | Refills: 3 | Status: DISCONTINUED | COMMUNITY
Start: 2021-05-28 | End: 2021-05-28

## 2021-05-29 NOTE — CHART NOTE - NSCHARTNOTEFT_GEN_A_CORE
Operative Note    Patient: Lin Acosta  : Aug 23, 1980  Surgery date: May 14, 2021    Pre-Operative Diagnosis: Refractory morbid obesity with multiple comorbid conditions.    Post-operative Diagnosis: Same    Primary Procedure  [55097] Lap Longitudinal Gastrectomy     Secondary Procedure(s)  [63926] Uppr Gi Endoscopy, Diagnosis   [59955] Transversus Abdominis Plan (TAP) Block Bilateral   [02642] Lap Revision of Duodenal Switch     Surgeon: James Kay M.D., FACS     Assistant surgeon: Casa Dial MD     Anesthesia type: General Anesthesia     ICD9 Code   Diagnosis   [E66.01]   MORBID SEVERE OBES D/T EXCESS IGNACIA (Stable)   [G89.18]   OTHER ACUTE POSTPROCEDURAL PAIN (Stable)   [J45.90]   UNSPECIFIED ASTHMA (Stable)   [Z68.43]   BODY MASS INDEX 50-59.9 ADULT (Stable)   [Z98.84]   BARIATRIC SURGERY STATUS (Stable)     Specimens: partial gastrectomy sent to pathology    Estimated blood loss: 30 ml     DRAINS: NONE    COMPLICATIONS: NONE     INDICATIONS OF THE PROCEDURE: The patient is a 40-year-old female who is morbidly obese with a body mass index of 58. The patient has multiple comorbidities due to her morbid obesity including asthma. The patient has failed multiple nonoperative attempts at weight loss. The patient had a sleeve gastrectomy in  in [place]. The patient meets NIH criteria for bariatric surgery and underwent appropriate preoperative workup, education, and signed the consent form freely. The patient had risks and benefits explained including, but not limited to, bleeding, leak, infection, disability, injury to transient structures, aspiration, DVT, pulmonary embolism, and death. The patient understood and agreed to proceed with surgery.     DESCRIPTION OF PROCEDURE: The patient was identified properly via a time-out. The patient was brought into the operating room and was placed on the operating room table in supine position. The patient was then given general endotracheal anesthesia and was given preoperative antibiotics. Preoperative heparin was given in the ambulatory surgery unit. The patient was then prepped and draped in the usual sterile fashion. An Exparel mixture was mixed at the back table with 20 mL of Exparel, 30 mL of 0.25% Marcaine and 150 mL of normal saline. A Veress needle was placed in the left upper quadrant. The abdomen was insufflated to 15 millimeters of mercury. Once the abdomen was insufflated, the Exparel mixture was given subcutaneously at the sites of incision. An incision was made within the umbilicus and a 12-millimeter trocar was placed in the abdomen. The laparoscope was inserted and there was no injury on initial trocar placement or initial Veress needle placement. A Transversus Abdominus Plane Block was then conducted by placing 20 mL of the Exparel mixture preperitoneal at the distribution of the spinal nerves on the lateral abdominal wall. This was started at the costal margin at the mid axillary line. 20cc of the Exparel mixture was placed in this area. Another 20 mL was placed four centimeters caudal to this area. Another 20 mL was placed four centimeters caudal to this area and another 15 mL was placed four centimeters caudal to this area. This was repeated on the opposite side of the body in a similar fashion. The rest of the Exparel was placed into the subcutaneous tissues and preperitoneal at every trocar site. A 5-millimeter and 15-millimeter trocar was placed in the right upper quadrant. A 12-millimeter and 5-millimeter trocar was placed in left upper quadrant. An incision was made in subxiphoid area and a liver retractor was placed to hold the liver anteriorly and superiorly and was held in place using Mediflex device. The patient was then placed into steep reverse Trendelenburg position. A point along the stomach was then measured approximately 5 cm proximal to the pylorus and adhesions to the resected portion of the greater curvature of the stomach were taken down from that point. The greater curvature was taken down all the way to the angle of His and the stomach was removed from the left crura using the LigaSure device. There were lot of adhesions to this area, which were taken down. At this point, the stomach was completely mobilized with the stomach off the left crura. The large deven fundus was identified. A 38-Occitan bougie was then placed in the stomach and placed into the antrum. At this point, a 60-mm iDrive staple with a tristaple black load was used to start transecting the stomach along the bougie. Approximately five more firings using the black load was used to staple the stomach to wrap around the bougie to make a nice tight sleeve around the bougie. Once the stomach was completely transected, the stomach was placed into an EndoCatch bag and removed from the abdomen. Vicryl suture was used to oversew the staple line in Lembert fashion from the top of the suture line to about intermediate down the sleeve gastrectomy in a continuous running fashion. Two sutures of 3-0 Vicryl were placed in the antrum of the stomach to the omental fat, so that the sleeve would not rotate.   We turned our attention to the duodenum. We dissected the duodenum posteriorly approximately 2 cm distal to the pylorus. At this point, finger was brought behind the duodenum to completely mobilized it and then a 60 mm purple load powered stapler was used to transect the duodenum at this point. We then ran the small intestine from the ileocecal valve to 300 cm proximal and this point on the small intestine was then anastomosed to the duodenum just distal to the pylorus. This anastomosis was done in 2 layers. There was a running Vicryl suture placed from the small bowel to the duodenum which approximated the 2 loops of bowel, then an enterotomy was made in both loops of bowel into the small intestine and into the duodenum. A posteriorly layer using 3-0 Polysorb suture using the Endo Stitch device was then used in a running fashion. Then the anterior layer of the anastomosis was then run with the 3-0 Polysorb suture using the Endo Stitch device as well and then the anastomosis was then Lemberted with a 3- 0 polysorb suture in a Lembert fashion. The anastomosis was patent. The afferent limb of the small intestine was then sutured to the proximal sleeve so that the food would flow from the sleeve into the efferent limb of the small intestine. The bow was then clamped and then an endoscopy was then performed in the endoscopy showed that the anastomosis was patent and the anastomosis was approximately 2 cm in length. At this time the anastomosis was completed. Hemostasis was achieved. The trochars were then removed and the patient tolerated procedure well. The skin was closed with4-0 Monocryl. Dermabond was applied over the incisions. The patient tolerated the procedure well and was brought to the recovery in stable condition.    Disposition  To recovery room, VS stable.

## 2021-06-03 ENCOUNTER — INPATIENT (INPATIENT)
Facility: HOSPITAL | Age: 41
LOS: 2 days | Discharge: HOME | DRG: 175 | End: 2021-06-06
Attending: HOSPITALIST | Admitting: INTERNAL MEDICINE
Payer: COMMERCIAL

## 2021-06-03 VITALS
OXYGEN SATURATION: 98 % | WEIGHT: 279.99 LBS | RESPIRATION RATE: 17 BRPM | DIASTOLIC BLOOD PRESSURE: 87 MMHG | HEART RATE: 98 BPM | TEMPERATURE: 98 F | SYSTOLIC BLOOD PRESSURE: 127 MMHG | HEIGHT: 63 IN

## 2021-06-03 DIAGNOSIS — Z98.84 BARIATRIC SURGERY STATUS: Chronic | ICD-10-CM

## 2021-06-03 DIAGNOSIS — R09.89 OTHER SPECIFIED SYMPTOMS AND SIGNS INVOLVING THE CIRCULATORY AND RESPIRATORY SYSTEMS: ICD-10-CM

## 2021-06-03 DIAGNOSIS — Z98.890 OTHER SPECIFIED POSTPROCEDURAL STATES: Chronic | ICD-10-CM

## 2021-06-03 DIAGNOSIS — I26.99 OTHER PULMONARY EMBOLISM WITHOUT ACUTE COR PULMONALE: ICD-10-CM

## 2021-06-03 DIAGNOSIS — I26.02 SADDLE EMBOLUS OF PULMONARY ARTERY WITH ACUTE COR PULMONALE: ICD-10-CM

## 2021-06-03 DIAGNOSIS — Z87.19 PERSONAL HISTORY OF OTHER DISEASES OF THE DIGESTIVE SYSTEM: Chronic | ICD-10-CM

## 2021-06-03 LAB
ALBUMIN SERPL ELPH-MCNC: 4.3 G/DL — SIGNIFICANT CHANGE UP (ref 3.3–5)
ALP SERPL-CCNC: 118 U/L — SIGNIFICANT CHANGE UP (ref 40–120)
ALT FLD-CCNC: 50 U/L — HIGH (ref 10–45)
ANION GAP SERPL CALC-SCNC: 20 MMOL/L — HIGH (ref 5–17)
AST SERPL-CCNC: 31 U/L — SIGNIFICANT CHANGE UP (ref 10–40)
BASOPHILS # BLD AUTO: 0.06 K/UL — SIGNIFICANT CHANGE UP (ref 0–0.2)
BASOPHILS NFR BLD AUTO: 0.7 % — SIGNIFICANT CHANGE UP (ref 0–2)
BILIRUB SERPL-MCNC: 0.6 MG/DL — SIGNIFICANT CHANGE UP (ref 0.2–1.2)
BUN SERPL-MCNC: 8 MG/DL — SIGNIFICANT CHANGE UP (ref 7–23)
CALCIUM SERPL-MCNC: 9.6 MG/DL — SIGNIFICANT CHANGE UP (ref 8.4–10.5)
CHLORIDE SERPL-SCNC: 102 MMOL/L — SIGNIFICANT CHANGE UP (ref 96–108)
CO2 SERPL-SCNC: 18 MMOL/L — LOW (ref 22–31)
CREAT SERPL-MCNC: 0.68 MG/DL — SIGNIFICANT CHANGE UP (ref 0.5–1.3)
D DIMER BLD IA.RAPID-MCNC: 3024 NG/ML DDU — HIGH
EOSINOPHIL # BLD AUTO: 0.14 K/UL — SIGNIFICANT CHANGE UP (ref 0–0.5)
EOSINOPHIL NFR BLD AUTO: 1.7 % — SIGNIFICANT CHANGE UP (ref 0–6)
GAS PNL BLDV: SIGNIFICANT CHANGE UP
GLUCOSE SERPL-MCNC: 91 MG/DL — SIGNIFICANT CHANGE UP (ref 70–99)
HCG SERPL-ACNC: <2 MIU/ML — SIGNIFICANT CHANGE UP
HCT VFR BLD CALC: 41.2 % — SIGNIFICANT CHANGE UP (ref 34.5–45)
HGB BLD-MCNC: 12.7 G/DL — SIGNIFICANT CHANGE UP (ref 11.5–15.5)
IMM GRANULOCYTES NFR BLD AUTO: 0.2 % — SIGNIFICANT CHANGE UP (ref 0–1.5)
LYMPHOCYTES # BLD AUTO: 1.56 K/UL — SIGNIFICANT CHANGE UP (ref 1–3.3)
LYMPHOCYTES # BLD AUTO: 18.5 % — SIGNIFICANT CHANGE UP (ref 13–44)
MCHC RBC-ENTMCNC: 24.7 PG — LOW (ref 27–34)
MCHC RBC-ENTMCNC: 30.8 GM/DL — LOW (ref 32–36)
MCV RBC AUTO: 80 FL — SIGNIFICANT CHANGE UP (ref 80–100)
MONOCYTES # BLD AUTO: 0.57 K/UL — SIGNIFICANT CHANGE UP (ref 0–0.9)
MONOCYTES NFR BLD AUTO: 6.8 % — SIGNIFICANT CHANGE UP (ref 2–14)
NEUTROPHILS # BLD AUTO: 6.09 K/UL — SIGNIFICANT CHANGE UP (ref 1.8–7.4)
NEUTROPHILS NFR BLD AUTO: 72.1 % — SIGNIFICANT CHANGE UP (ref 43–77)
NRBC # BLD: 0 /100 WBCS — SIGNIFICANT CHANGE UP (ref 0–0)
NT-PROBNP SERPL-SCNC: 152 PG/ML — SIGNIFICANT CHANGE UP (ref 0–300)
PLATELET # BLD AUTO: 339 K/UL — SIGNIFICANT CHANGE UP (ref 150–400)
POTASSIUM SERPL-MCNC: 3.8 MMOL/L — SIGNIFICANT CHANGE UP (ref 3.5–5.3)
POTASSIUM SERPL-SCNC: 3.8 MMOL/L — SIGNIFICANT CHANGE UP (ref 3.5–5.3)
PROT SERPL-MCNC: 8 G/DL — SIGNIFICANT CHANGE UP (ref 6–8.3)
RBC # BLD: 5.15 M/UL — SIGNIFICANT CHANGE UP (ref 3.8–5.2)
RBC # FLD: 19.2 % — HIGH (ref 10.3–14.5)
SODIUM SERPL-SCNC: 140 MMOL/L — SIGNIFICANT CHANGE UP (ref 135–145)
TROPONIN T, HIGH SENSITIVITY RESULT: 9 NG/L — SIGNIFICANT CHANGE UP (ref 0–51)
WBC # BLD: 8.44 K/UL — SIGNIFICANT CHANGE UP (ref 3.8–10.5)
WBC # FLD AUTO: 8.44 K/UL — SIGNIFICANT CHANGE UP (ref 3.8–10.5)

## 2021-06-03 PROCEDURE — 93971 EXTREMITY STUDY: CPT | Mod: 26,LT

## 2021-06-03 PROCEDURE — 71275 CT ANGIOGRAPHY CHEST: CPT | Mod: 26,MG

## 2021-06-03 PROCEDURE — 71045 X-RAY EXAM CHEST 1 VIEW: CPT | Mod: 26

## 2021-06-03 PROCEDURE — 99291 CRITICAL CARE FIRST HOUR: CPT

## 2021-06-03 PROCEDURE — 93010 ELECTROCARDIOGRAM REPORT: CPT

## 2021-06-03 PROCEDURE — 93306 TTE W/DOPPLER COMPLETE: CPT | Mod: 26

## 2021-06-03 PROCEDURE — G1004: CPT

## 2021-06-03 RX ORDER — HEPARIN SODIUM 5000 [USP'U]/ML
10000 INJECTION INTRAVENOUS; SUBCUTANEOUS ONCE
Refills: 0 | Status: COMPLETED | OUTPATIENT
Start: 2021-06-03 | End: 2021-06-03

## 2021-06-03 RX ORDER — HEPARIN SODIUM 5000 [USP'U]/ML
5000 INJECTION INTRAVENOUS; SUBCUTANEOUS EVERY 6 HOURS
Refills: 0 | Status: DISCONTINUED | OUTPATIENT
Start: 2021-06-03 | End: 2021-06-04

## 2021-06-03 RX ORDER — CHLORHEXIDINE GLUCONATE 213 G/1000ML
1 SOLUTION TOPICAL
Refills: 0 | Status: DISCONTINUED | OUTPATIENT
Start: 2021-06-03 | End: 2021-06-04

## 2021-06-03 RX ORDER — MORPHINE SULFATE 50 MG/1
4 CAPSULE, EXTENDED RELEASE ORAL ONCE
Refills: 0 | Status: DISCONTINUED | OUTPATIENT
Start: 2021-06-03 | End: 2021-06-03

## 2021-06-03 RX ORDER — HEPARIN SODIUM 5000 [USP'U]/ML
INJECTION INTRAVENOUS; SUBCUTANEOUS
Qty: 25000 | Refills: 0 | Status: DISCONTINUED | OUTPATIENT
Start: 2021-06-03 | End: 2021-06-04

## 2021-06-03 RX ORDER — MORPHINE SULFATE 50 MG/1
2 CAPSULE, EXTENDED RELEASE ORAL ONCE
Refills: 0 | Status: DISCONTINUED | OUTPATIENT
Start: 2021-06-03 | End: 2021-06-04

## 2021-06-03 RX ORDER — HEPARIN SODIUM 5000 [USP'U]/ML
10000 INJECTION INTRAVENOUS; SUBCUTANEOUS EVERY 6 HOURS
Refills: 0 | Status: DISCONTINUED | OUTPATIENT
Start: 2021-06-03 | End: 2021-06-04

## 2021-06-03 RX ADMIN — MORPHINE SULFATE 4 MILLIGRAM(S): 50 CAPSULE, EXTENDED RELEASE ORAL at 21:03

## 2021-06-03 RX ADMIN — HEPARIN SODIUM 10000 UNIT(S): 5000 INJECTION INTRAVENOUS; SUBCUTANEOUS at 21:44

## 2021-06-03 RX ADMIN — HEPARIN SODIUM 2300 UNIT(S)/HR: 5000 INJECTION INTRAVENOUS; SUBCUTANEOUS at 21:45

## 2021-06-03 RX ADMIN — MORPHINE SULFATE 4 MILLIGRAM(S): 50 CAPSULE, EXTENDED RELEASE ORAL at 17:33

## 2021-06-03 NOTE — ED ADULT TRIAGE NOTE - CHIEF COMPLAINT QUOTE
chest pain and sob. pt endorses this feels like previous PEs. has recent surgery on 5/14. last dose of Lovenox on 5/25. pt started having L calf pain on 5/24. had negative doppler on 5/24. L calf pain has worsened

## 2021-06-03 NOTE — H&P ADULT - NSHPPHYSICALEXAM_GEN_ALL_CORE
Vital Signs Last 24 Hrs  T(C): 36.8 (03 Jun 2021 19:48), Max: 36.8 (03 Jun 2021 16:07)  T(F): 98.3 (03 Jun 2021 19:48), Max: 98.3 (03 Jun 2021 19:48)  HR: 87 (03 Jun 2021 19:48) (87 - 98)  BP: 126/91 (03 Jun 2021 19:48) (126/91 - 127/87)  BP(mean): --  RR: 15 (03 Jun 2021 19:48) (15 - 17)  SpO2: 98% (03 Jun 2021 19:48) (98% - 98%) Vital Signs Last 24 Hrs  T(C): 36.8, Max: 36.8 (03 Jun 2021 16:07)  HR: 87 (87 - 98)  BP: 126/91 (126/91 - 127/87)  RR: 15 (03 Jun 2021 19:48) (15 - 17)  SpO2: 98% (98% - 98%) on room air    General: Awake, alert, in no acute distress. Obese  HEENT: Normocephalic, atraumatic. EOMI. Moist mucus membranes.  Pulmonary: Symmetric chest rise. No extra work of breathing. Lungs clear to auscultation bilaterally.  Cardiovascular: Normal rate and regular rhythm. No murmurs/rubs/gallops. Palpable radial and DP pulses bilaterally.  Abdominal: Soft, non-distended, non-tender. Obese. Well healed lap surgical incisions.  Skin: Left calf erythematous rash to posteromedial aspect with swelling. Multiple tattoos on torso  Neurologic: No focal defects  Psychiatric: Mood appropriate

## 2021-06-03 NOTE — H&P ADULT - NSHPSOCIALHISTORY_GEN_ALL_CORE
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

## 2021-06-03 NOTE — H&P ADULT - ASSESSMENT
Patient is a 40 year old woman with PMH asthma (multiple intubations, last 1992), idiopathic PE in 2018 (on Xarelto till 1/2020), morbid obesity s/p lap sleeve gastrectomy in 2012 complicated by hiatal hernia and gastric sleeve revision with duodenal switch and hiatal hernia repair on 5/14/21 who presents with shortness of breath and found to have saddle PE.     Patient is a 40 year old woman with PMH asthma (multiple intubations, last 1992), idiopathic PE in 2018 (on Xarelto till 1/2020), morbid obesity s/p lap sleeve gastrectomy in 2012 complicated by hiatal hernia and gastric sleeve revision with duodenal switch and hiatal hernia repair on 5/14/21 who presents with shortness of breath and found to have saddle PE.      ======NEUROLOGY======  No acute issues  A&O x 3    ======PULMONARY======  PMHx asthma, PE  #Pulmonary embolism with right heart strain  - Heparin drip, monitor PTT  - Monitor with continuous pulse oximetry  - Wean to room air as tolerate  - Monitor for hemodynamic instability/necessity for tPA, mechanical thrombectomy    ====CARDIOVASCULAR====  #PE  - Management as above, see PULMONARY    ========RENAL========  No acute issues  - Strict I&O  - Trend Cr    ===GASTROINTESTINAL====  PMH morbid obesity s/p lap sleeve gastrectomy (2012) s/p gastric sleeve revision with duodenal switch and hiatal hernia repair (5/14/21)  - Surgery consulted - no objection to heparinization  - Bariatric diet    ===INFECTIOUS DISEASE===  Afebrile, no leukocytosis    =====HEMATOLOGIC=====  PMHx of recurrent PE and at present admission  - Continue heparin drip as for PE above  - Monitor PTT  - Hgb/Hct stable  - Will likely need long term anticoagulation given previous unknown etiology and current PE while on lovenox    ======ENDOCRINE=======  No PMHx of DM. A1c on 5/6 was 5.5    ===MUSCULOSKELETAL====  Ambulate as tolerated    =====PROPHYLAXIS======  On heparin drip for PE   Patient is a 40 year old woman with PMH asthma (multiple intubations, last 1992), idiopathic PE in 2018 (on Xarelto till 1/2020), morbid obesity s/p lap sleeve gastrectomy in 2012 complicated by hiatal hernia and gastric sleeve revision with duodenal switch and hiatal hernia repair on 5/14/21 who presents with shortness of breath and found to have saddle PE.      ======NEUROLOGY======  No acute issues  A&O x 3    ======PULMONARY======  PMHx asthma, PE  #Pulmonary embolism with right heart strain  - Heparin drip, monitor PTT  - Monitor with continuous pulse oximetry  - Wean to room air as tolerate  - Monitor for hemodynamic instability/necessity for tPA, mechanical thrombectomy    ====CARDIOVASCULAR====  #PE  - Management as above, see PULMONARY    ========RENAL========  No acute issues  - Strict I&O  - Trend Cr    ===GASTROINTESTINAL====  PMH morbid obesity s/p lap sleeve gastrectomy (2012) s/p gastric sleeve revision with duodenal switch and hiatal hernia repair (5/14/21)  - Surgery consulted - no objection to heparinization  - Bariatric diet    ===INFECTIOUS DISEASE===  Afebrile, no leukocytosis    =====HEMATOLOGIC=====  PMHx of recurrent PE and at present admission  - Continue heparin drip as for PE above  - Monitor PTT  - Hgb/Hct stable  - Will likely need long term anticoagulation given previous unknown etiology and current PE while on lovenox    ======ENDOCRINE=======  No PMHx of DM. A1c on 5/6 was 5.5    ===MUSCULOSKELETAL====  Ambulate as tolerated    =====PROPHYLAXIS======  On heparin drip for PE    Maria De Jesus Sanchez MD  PGY1 Medicine Patient is a 40 year old woman with PMH asthma (multiple intubations, last 1992), idiopathic PE in 2018 (on Xarelto till 1/2020), morbid obesity s/p lap sleeve gastrectomy in 2012 complicated by hiatal hernia and gastric sleeve revision with duodenal switch and hiatal hernia repair on 5/14/21 who presents with shortness of breath and found to have saddle PE.      ======NEUROLOGY======  No acute issues  A&O x 3    ======PULMONARY======  PMHx asthma, PE  #Pulmonary embolism with right heart strain  - Heparin drip, monitor PTT  - Monitor with continuous pulse oximetry  - Wean to room air as tolerate  - Monitor for hemodynamic instability/necessity for tPA, mechanical thrombectomy    ====CARDIOVASCULAR====  #PE  - Management as above, see PULMONARY  - Telemetry    ========RENAL========  No acute issues  - Strict I&O  - Trend Cr    ===GASTROINTESTINAL====  PMH morbid obesity s/p lap sleeve gastrectomy (2012) s/p gastric sleeve revision with duodenal switch and hiatal hernia repair (5/14/21)  - Surgery consulted - no objection to heparinization  - Bariatric diet    ===INFECTIOUS DISEASE===  Afebrile, no leukocytosis    =====HEMATOLOGIC=====  PMHx of recurrent PE and at present admission  - Continue heparin drip as for PE above  - Monitor PTT  - Hgb/Hct stable  - Will likely need long term anticoagulation given previous unknown etiology and current PE while on lovenox    ======ENDOCRINE=======  No PMHx of DM. A1c on 5/6 was 5.5    ===MUSCULOSKELETAL====  Ambulate as tolerated    =====PROPHYLAXIS======  On heparin drip for PE    Maria De Jesus Sanchez MD  PGY1 Medicine Patient is a 40 year old woman with PMH asthma (multiple intubations, last 1992), idiopathic PE in 2018 (on Xarelto till 1/2020), morbid obesity s/p lap sleeve gastrectomy in 2012 complicated by hiatal hernia and gastric sleeve revision with duodenal switch and hiatal hernia repair on 5/14/21 who presents with shortness of breath and found to have saddle PE with evidence of right heart strain.      ======NEUROLOGY======  No acute issues  A&O x 3    ======PULMONARY======  PMHx asthma, PE  #Pulmonary embolism with right heart strain  - Heparin drip, monitor PTT  - Monitor with continuous pulse oximetry  - Wean to room air as tolerate  - Monitor for hemodynamic instability/necessity for tPA, mechanical thrombectomy    ====CARDIOVASCULAR====  #PE  - Management as above, see PULMONARY  - TTE  - Telemetry    ========RENAL========  No acute issues  - Strict I&O  - Trend Cr    ===GASTROINTESTINAL====  PMH morbid obesity s/p lap sleeve gastrectomy (2012) s/p gastric sleeve revision with duodenal switch and hiatal hernia repair (5/14/21)  - Surgery consulted - no objection to heparinization  - Bariatric diet    ===INFECTIOUS DISEASE===  Afebrile, no leukocytosis    =====HEMATOLOGIC=====  PMHx of recurrent PE and at present admission  - Continue heparin drip as for PE above  - Monitor PTT  - Hgb/Hct stable  - Will likely need long term anticoagulation given previous unknown etiology and current PE while on lovenox    ======ENDOCRINE=======  No PMHx of DM. A1c on 5/6 was 5.5    ===MUSCULOSKELETAL====  Ambulate as tolerated    =====PROPHYLAXIS======  On heparin drip for PE    Maria De Jesus Sanchez MD  PGY1 Medicine Patient is a 40 year old woman with PMH asthma (multiple intubations, last 1992), idiopathic PE in 2018 (on Xarelto till 1/2020), morbid obesity s/p lap sleeve gastrectomy in 2012 complicated by hiatal hernia and gastric sleeve revision with duodenal switch and hiatal hernia repair on 5/14/21 who presents with shortness of breath and found to have saddle PE with evidence of right heart strain.      ======NEUROLOGY======  No acute issues  A&O x 3    ======PULMONARY======  PMHx asthma, PE  #Pulmonary embolism with right heart strain  - Heparin drip, monitor PTT  - Monitor with continuous pulse oximetry  - Wean to room air as tolerate  - Monitor for hemodynamic instability/necessity for tPA, mechanical thrombectomy    #Asthma  Home on Arnuity (fluticasone)  - Continue with fluticasone propionate 50 BID    ====CARDIOVASCULAR====  #PE  - Management as above, see PULMONARY  - TTE  - Telemetry    ========RENAL========  No acute issues  - Strict I&O  - Trend Cr    ===GASTROINTESTINAL====  PMH morbid obesity s/p lap sleeve gastrectomy (2012) s/p gastric sleeve revision with duodenal switch and hiatal hernia repair (5/14/21)  - Surgery consulted - no objection to heparinization  - Bariatric diet    ===INFECTIOUS DISEASE===  Afebrile, no leukocytosis    =====HEMATOLOGIC=====  PMHx of recurrent PE and at present admission  - Continue heparin drip as for PE above  - Monitor PTT  - Hgb/Hct stable  - Will likely need long term anticoagulation given previous unknown etiology and current PE while on lovenox    ======ENDOCRINE=======  No PMHx of DM. A1c on 5/6 was 5.5    ===MUSCULOSKELETAL====  Ambulate as tolerated    =====PROPHYLAXIS======  On heparin drip for PE    Maria De Jesus Sanchez MD  PGY1 Medicine Patient is a 40 year old woman with PMH asthma (multiple intubations, last 1992), idiopathic PE in 2018 (on Xarelto till 1/2020), morbid obesity s/p lap sleeve gastrectomy in 2012 complicated by hiatal hernia and gastric sleeve revision with duodenal switch and hiatal hernia repair on 5/14/21 who presents with shortness of breath and found to have saddle PE with evidence of right heart strain.      ======NEUROLOGY======  No acute issues  A&O x 3    ======PULMONARY======  PMHx asthma, PE  #Pulmonary embolism with right heart strain  - Heparin drip, monitor PTT  - Monitor with continuous pulse oximetry  - Wean to room air as tolerate  - Monitor for hemodynamic instability/necessity for tPA, mechanical thrombectomy    #Asthma  Home on Arnuity (fluticasone)  - Continue with fluticasone propionate 50 BID    ====CARDIOVASCULAR====  #PE  - Management as above, see PULMONARY  - TTE  - Daily pro-BNP  - Telemetry  - Vascular cardiology following    ========RENAL========  No acute issues  - Strict I&O  - Trend Cr    ===GASTROINTESTINAL====  PMH morbid obesity s/p lap sleeve gastrectomy (2012) s/p gastric sleeve revision with duodenal switch and hiatal hernia repair (5/14/21)  - Surgery consulted - no objection to heparinization  - Bariatric diet    ===INFECTIOUS DISEASE===  Afebrile, no leukocytosis    =====HEMATOLOGIC=====  PMHx of recurrent PE and at present admission  - Continue heparin drip as for PE above  - Monitor PTT  - Hgb/Hct stable  - Will likely need long term anticoagulation given previous unknown etiology and current PE while on lovenox    ======ENDOCRINE=======  No PMHx of DM. A1c on 5/6 was 5.5    ===MUSCULOSKELETAL====  Ambulate as tolerated    =====PROPHYLAXIS======  On heparin drip for PE    Maria De Jesus Sanchez MD  PGY1 Medicine Patient is a 40 year old woman with PMH asthma (multiple intubations, last 1992), idiopathic PE in 2018 (on Xarelto till 1/2020), morbid obesity s/p lap sleeve gastrectomy in 2012 and gastric sleeve revision with duodenal switch and hiatal hernia repair on 5/14/21 who presents with shortness of breath and found to have saddle PE with evidence of right heart strain.      ======NEUROLOGY======  No acute issues  A&O x 3    ======PULMONARY======  PMHx asthma, PE  #Pulmonary embolism with right heart strain  - Heparin drip, monitor PTT  - Monitor with continuous pulse oximetry  - Wean to room air as tolerate  - Monitor for hemodynamic instability/necessity for tPA, mechanical thrombectomy    #Asthma  Home on Arnuity (fluticasone)  - Continue with mometasone (therapeutic interchange)    ====CARDIOVASCULAR====  #PE  - Management as above, see PULMONARY  - TTE  - Daily pro-BNP  - Telemetry  - Vascular cardiology following    ========RENAL========  No acute issues  - Strict I&O  - Trend Cr    ===GASTROINTESTINAL====  PMH morbid obesity s/p lap sleeve gastrectomy (2012) s/p gastric sleeve revision with duodenal switch and hiatal hernia repair (5/14/21)  - Surgery consulted - no objection to heparinization  - Soft diet  - Protonix 40 BID IV    ===INFECTIOUS DISEASE===  Afebrile, no leukocytosis    =====HEMATOLOGIC=====  PMHx of recurrent PE and at present admission  - Continue heparin drip as for PE above  - Monitor PTT  - Hgb/Hct stable  - Will likely need long term anticoagulation given previous unknown etiology and current PE while on lovenox    ======ENDOCRINE=======  No PMHx of DM. A1c on 5/6 was 5.5    ===MUSCULOSKELETAL====  Ambulate as tolerated    =====PROPHYLAXIS======  On heparin drip for PE    Maria De Jesus Sanchez MD  PGY1 Medicine Patient is a 40 year old woman with PMH asthma (multiple intubations, last 1992), idiopathic PE in 2018 (on Xarelto till 1/2020), morbid obesity s/p lap sleeve gastrectomy in 2012 and gastric sleeve revision with duodenal switch and hiatal hernia repair on 5/14/21 who presents with shortness of breath and found to have saddle PE with evidence of right heart strain.      ======NEUROLOGY======  No acute issues  A&O x 3    ======PULMONARY======  PMHx asthma, PE  #Pulmonary embolism with right heart strain  - Heparin drip, monitor PTT  - Monitor with continuous pulse oximetry  - Wean to room air as tolerate  - Monitor for hemodynamic instability/necessity for tPA, mechanical thrombectomy    #Asthma  Home on Arnuity (fluticasone)  - Continue with mometasone (therapeutic interchange)    ====CARDIOVASCULAR====  #PE  - Management as above, see PULMONARY  - TTE  - Daily pro-BNP  - Telemetry  - Vascular cardiology following    ========RENAL========  No acute issues  - Strict I&O  - Trend Cr    ===GASTROINTESTINAL====  PMH morbid obesity s/p lap sleeve gastrectomy (2012) s/p gastric sleeve revision with duodenal switch and hiatal hernia repair (5/14/21)  - Surgery consulted - no objection to heparinization  - Bariatric diet  - Home multivitamins, vitamin D3, calcium  - Protonix 40 BID IV    ===INFECTIOUS DISEASE===  Afebrile, no leukocytosis    =====HEMATOLOGIC=====  PMHx of recurrent PE and at present admission  - Continue heparin drip as for PE above  - Monitor PTT  - Hgb/Hct stable  - Will likely need long term anticoagulation given previous unknown etiology and current PE while on lovenox    ======ENDOCRINE=======  No PMHx of DM. A1c on 5/6 was 5.5    ===MUSCULOSKELETAL====  Ambulate as tolerated    =====PROPHYLAXIS======  On heparin drip for PE    Maria De Jesus Sanchez MD  PGY1 Medicine

## 2021-06-03 NOTE — ED PROVIDER NOTE - CRITICAL CARE ATTENDING CONTRIBUTION TO CARE
Attending MD Chen:   I personally have seen and examined this patient.  Physician assistant note reviewed and agree on plan of care and except where noted.  See below for details.     Seen in [facility]    40F with PMH/PSH including PE (on Eliquis), s/p sleeve gastrectomy 5/14/21 (Dr. Kay, [place]) presents to the ED with LLE pain, shortness of breath and increase in baseline HR.  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  Reports previously had PE and despite numerous workups unclear etiology.  Reports had surgery on 5/14/21.  Denies abdominal pain, nausea, vomiting, diarrhea, blood in stools. Reports had follow up with Dr. Kay.  Reports after surgery had LLE pain, was told she had phlebitis on US on 5/24/21.  Reports post operatively was on Lovenox and was switched to Eliquis.  Reports just picked up the Eliquis yesterday and took 10mg yesterday and 10mg today.  Reports two days ago began feeling short of breath with occasional palpitations.  Reports that her resting HR is usually 60s-70s and has been in the 90-100s.  Reports this morning while standing and making lunch it was 140s.  Denies chest pain.  Reports feels similar to the time when she had her last PE.  Denies dysuria, hematuria, change in urinary habits including frequency, urgency. Denies change in vision, double vision, sudden loss of vision, headache. Denies fevers, chills, dizziness, weakness. A ten (10) point review of systems was negative other than as stated in the HPI or elsewhere in the chart.    Exam:   General: NAD  HENT: head NCAT, airway patent with moist mucous membranes  Eyes: PERRL  Lungs: lungs CTAB with good inspiratory effort, no wheezing, no rhonchi, no rales, no increased work of breathing, no retractions, no hypoxia  Cardiac: +S1S2, no m/r/g, no tachycardia at time of exam  GI: abdomen soft with +BS, NT, exam with well healed recent surgical incisions, ecchymosis on lower abdomen (reports Lovenox injections)  : no CVAT  MSK: FROM at neck, no tenderness to midline palpation, no stepoffs along length of spine, +L calf tenderness with erythema, no area of fluctuance, +warmth, +palpable cord in area of erythema  Neuro: moving all extremities with 5/5 strength bilateral upper and lower extremities, sensory grossly intact, no gross neuro deficits  Psych: normal mood and affect     A/P: 40F with shortness of breath, palpitations, in setting of recent sx and prior PE, concern for PE and given LLE findings, concern for DVT (vs phelbitis vs cellulitis), will obtain labs, EKG, CXR, CTA Chest, cardiac monitor

## 2021-06-03 NOTE — ASSESSMENT
[FreeTextEntry1] : This is a 39 year old woman with a history of extensive bilateral unprovoked PE in October 30th 2019.  Patient had a hypercoagulable workup at the time, anticardiolipin antibodies were negative, Protein C and S normal, no evidence of a hypercoagulable state.  Patient now presents for re-evaluation in the setting of an upcoming IVF procedure.  The hypercoagulable workup was already negative, patient patient already completed.  the course of 1 year of Eliquis therapy for the PE.  \par Hypercoagulable state evaluation negative. 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  Recommend hot compresses, and recommend patient increase anticoagulation to full dose with Eliquis 10mg BID for a week followed by standard maintenance dose of 5mg daily for a month for relief of thrombophlebitis symptoms. Also recommend repeat Doppler ultrasound to rule out progression to deep vein thrombosis.  \par \par \par Patient has an upcoming carpal tunnel release surgery.  She is on Eliquis for treatment of a refractory superficial thrombophlebitis.  There is no contraindication to this surgery from a hematologic standpoint. Will need to hold Eliquis for a full 48 hours prior to procedure, then restart the following day.  Patient hematologically cleared for carpal tunnel release surgery.

## 2021-06-03 NOTE — ED PROVIDER NOTE - PHYSICAL EXAMINATION
CONSTITUTIONAL: Patient is awake, alert and oriented x 3. Patient is well appearing and in no acute distress  HEAD: NCAT  NECK: supple, FROM  LUNGS: CTA B/L  HEART: RRR  ABDOMEN: Healed laparoscopic incision sites, scattered ecchymosis to lower abd, soft nd/nttp no rebound or guarding  EXTREMITY: L calf w/ area posterior erythema and ttp. FROM upper and lower ext b/l  SKIN: with no rash or lesions  NEURO: no focal deficits

## 2021-06-03 NOTE — HISTORY OF PRESENT ILLNESS
[Home] : at home, [unfilled] , at the time of the visit. [Medical Office: ([facility])___] : at the medical office located in  [FreeTextEntry3] : Telephone,  the [facility] would not fully connect with voice or video.   [de-identified] : This is a 39 year old woman who presents for the evaluation of a history of venus thromboembolism in the setting of an ongoing workup for in vitro fertilization. Patient had a history of a massive unprovoked  bilateral PE, all 5 lobes affected.  PE took place on October 30th, discovered on CT angiogram.  Hypercoagulable workup negative. Patient was teated with a year o eliquis.  Since then she has had no recurrent VTE events.   [de-identified] : 39 year old woman with a history of a extensive unprovoked bilateral PE 2018.  She presents for hematologic reevaluation in the setting of an upcoming IVF procedure with Dr. Dana Bryan planned for late August.  Since the time after the initial b/l PE, patient has not had any recurrent symptoms. Still feels some mild shortness of breath and dyspnea as her baseline. She had seen Dr. Jurado for pulmonary evaluation. She was going to have an echocardiogram and possibly a VQ scan to look for residual disease but this has not yet taken place.  \[facility] Also has an appointment with Dr. Radha Swift upcoming [facility] appointment.  \par \[facility] Also concerned about hereditary cancer syndromes. Mother side had extensive cancer history Maternal grandfather her  of colon cancer, and Maternal uncle  colon cancer and liver cancer\par Father had colon cancer 5 years ago. Also colon liver and uncle \par \par Patient underwnt bariatric surgery.  Dischared on the . Gastric sleeve revision and a SATI procedure.  Was at [place].  \par \par patient on intermediate dose lovenox 40mg BID.  \par \par Thought she had a blood clot on her legs.  Doppler did not show a DVT, had phlebitis.  at the site of a varicose vein.  21  Phlebitis has grown to the size of an orange, but pain has decreased compared to Monday.  Has been mobile, has bee nusing hot packs.  \par Leukemia on maternal cousin\par Grandmother's mother myeloma.  \par Mothers uncle had esophageal cancer.  \par \par

## 2021-06-03 NOTE — CONSULT NOTE ADULT - SUBJECTIVE AND OBJECTIVE BOX
BARIATRIC SURGERY CONSULT NOTE    Consulting surgical team: [id number]  Consulting attending: Dr. Wagoner    HPI: 41 y/o female with a PMH of asthma (intubated 4 times, last in 1992), PE in October of 2018 (off all anticoagulation since 1/2020) and GERD. She underwent sleeve gastrectomy (2012, in [place]) and originally lost 80 pounds but then gained that weight back, s/p upper endoscopy, laparoscopic gastrectomy revision, and duodenal switch 5/14. Post-operative course was unremarkable and she was discharged home on POD2 on prophylactic lovenox for two weeks. After she completed her lovenox she noted a discoloration on her left posterior calf. She was evaluated at urgent care and told she has thrombophlebitis, and her Hematologist prescribed Eliquis, however she was off anticoagulation for 1 week while waiting for the prescription. Today patient presents with chest pain, dyspnea and palpitations, and in the ED was found to have saddle PE w/concern for R heart strain on CT. Bariatric Surgery now consulted for evaluation prior to heparinization.    Upon evaluation in ED patient c/o palpitations, mild dyspnea, and chest discomfort. She denies abdominal pain, nausea, vomiting, fevers, chills. Is tolerating bariatric diet, passing flatus, and having mild diarrhea, nonbloody.     PAST MEDICAL HISTORY:  Asthma    Vertigo, benign positional    Knee dislocation, left, sequela    Meniscus tear    Polyp    Morbid obesity    Pulmonary embolism    Obesity    GERD (gastroesophageal reflux disease)    Anxiety    Pulmonary embolism    Insomnia    Left carpal tunnel syndrome    COVID-19 vaccine series completed    Cervical herniated disc    Varicose veins    Morbid obesity with BMI of 50.0-59.9, adult    Thrombocytopenia    Thrombocytosis        PAST SURGICAL HISTORY:  Status post laparoscopic sleeve gastrectomy    Bariatric surgery status    H/O hiatal hernia    H/O rectal polypectomy    History of weight loss surgery        MEDICATIONS:      ALLERGIES:  pseudoephedrine (Other)  red dye (Vomiting)  Reglan (Other)  sulfa drugs (Hives; Anaphylaxis)      VITALS & I/Os:  Vital Signs Last 24 Hrs  T(C): 36.8 (03 Jun 2021 19:48), Max: 36.8 (03 Jun 2021 16:07)  T(F): 98.3 (03 Jun 2021 19:48), Max: 98.3 (03 Jun 2021 19:48)  HR: 87 (03 Jun 2021 19:48) (87 - 98)  BP: 126/91 (03 Jun 2021 19:48) (126/91 - 127/87)  BP(mean): --  RR: 15 (03 Jun 2021 19:48) (15 - 17)  SpO2: 98% (03 Jun 2021 19:48) (98% - 98%)    I&O's Summary      PHYSICAL EXAM:  General: well developed, well nourished, NAD  Neuro: alert and oriented, no focal deficits, moves all extremities spontaneously  HEENT: NCAT, EOMI, anicteric, mucosa moist  Respiratory: airway patent, respirations unlabored  CVS: regular rate and rhythm  Abdomen: soft, nontender, nondistended  Extremities: no edema, sensation and movement grossly intact  Skin: warm, dry, appropriate color      LABS:                        12.7   8.44  )-----------( 339      ( 03 Jun 2021 17:39 )             41.2     06-03    140  |  102  |  8   ----------------------------<  91  3.8   |  18<L>  |  0.68    Ca    9.6      03 Jun 2021 17:39    TPro  8.0  /  Alb  4.3  /  TBili  0.6  /  DBili  x   /  AST  31  /  ALT  50<H>  /  AlkPhos  118  06-03    Lactate:                  IMAGING:   BARIATRIC SURGERY CONSULT NOTE    Consulting surgical team: [id number]  Consulting attending: Dr. Wagoner    HPI: 39 y/o female with a PMH of asthma (intubated 4 times, last in 1992), PE in October of 2018 (off all anticoagulation since 1/2020) and GERD. She underwent sleeve gastrectomy (2012, in [place]) and originally lost 80 pounds but then gained that weight back, s/p upper endoscopy, laparoscopic gastrectomy revision, and duodenal switch 5/14. Post-operative course was unremarkable and she was discharged home on POD2 on prophylactic lovenox for two weeks. After she completed her lovenox she noted a discoloration on her left posterior calf. She was evaluated at urgent care and told she has thrombophlebitis, and her Hematologist prescribed Eliquis, however she was off anticoagulation for 1 week while waiting for the prescription. Today patient presents with chest pain, dyspnea and palpitations, and in the ED was found to have saddle PE w/concern for R heart strain on CT. Bariatric Surgery now consulted for evaluation prior to heparinization.    Upon evaluation in ED patient c/o palpitations, mild dyspnea, and chest discomfort. She denies abdominal pain, nausea, vomiting, fevers, chills. Is tolerating bariatric diet, passing flatus, and having mild diarrhea, nonbloody.     PAST MEDICAL HISTORY:  Asthma    Vertigo, benign positional    Knee dislocation, left, sequela    Meniscus tear    Polyp    Morbid obesity    Pulmonary embolism    Obesity    GERD (gastroesophageal reflux disease)    Anxiety    Pulmonary embolism    Insomnia    Left carpal tunnel syndrome    COVID-19 vaccine series completed    Cervical herniated disc    Varicose veins    Morbid obesity with BMI of 50.0-59.9, adult    Thrombocytopenia    Thrombocytosis        PAST SURGICAL HISTORY:  Status post laparoscopic sleeve gastrectomy    Bariatric surgery status    H/O hiatal hernia    H/O rectal polypectomy    History of weight loss surgery        MEDICATIONS:      ALLERGIES:  pseudoephedrine (Other)  red dye (Vomiting)  Reglan (Other)  sulfa drugs (Hives; Anaphylaxis)      VITALS & I/Os:  Vital Signs Last 24 Hrs  T(C): 36.8 (03 Jun 2021 19:48), Max: 36.8 (03 Jun 2021 16:07)  T(F): 98.3 (03 Jun 2021 19:48), Max: 98.3 (03 Jun 2021 19:48)  HR: 87 (03 Jun 2021 19:48) (87 - 98)  BP: 126/91 (03 Jun 2021 19:48) (126/91 - 127/87)  BP(mean): --  RR: 15 (03 Jun 2021 19:48) (15 - 17)  SpO2: 98% (03 Jun 2021 19:48) (98% - 98%)    I&O's Summary      PHYSICAL EXAM:  General: well developed, well nourished, NAD  Neuro: alert and oriented, no focal deficits, moves all extremities spontaneously  HEENT: NCAT, EOMI, anicteric, mucosa moist  Respiratory: airway patent, respirations unlabored, saturating 90% on RA  CVS: regular rate and rhythm  Abdomen: soft, obese, nontender, port sites healing well c/d/i  Extremities: no edema, sensation and movement grossly intact  Skin: warm, dry, appropriate color      LABS:                        12.7   8.44  )-----------( 339      ( 03 Jun 2021 17:39 )             41.2     06-03    140  |  102  |  8   ----------------------------<  91  3.8   |  18<L>  |  0.68    Ca    9.6      03 Jun 2021 17:39    TPro  8.0  /  Alb  4.3  /  TBili  0.6  /  DBili  x   /  AST  31  /  ALT  50<H>  /  AlkPhos  118  06-03    Lactate:                  IMAGING:

## 2021-06-03 NOTE — H&P ADULT - NSHPLABSRESULTS_GEN_ALL_CORE
Labs personally reviewed:                        12.7   8.44  )-----------( 339      ( 03 Jun 2021 17:39 )             41.2       06-03    140  |  102  |  8   ----------------------------<  91  3.8   |  18<L>  |  0.68    Ca    9.6      03 Jun 2021 17:39    TPro  8.0  /  Alb  4.3  /  TBili  0.6  /  DBili  x   /  AST  31  /  ALT  50<H>  /  AlkPhos  118  06-03      LIVER FUNCTIONS - ( 03 Jun 2021 17:39 )  Alb: 4.3 g/dL / Pro: 8.0 g/dL / ALK PHOS: 118 U/L / ALT: 50 U/L / AST: 31 U/L / GGT: x    [facility]  COMPARISON: Chest CT from 2/14/2018.  IMPRESSION: Saddle pulmonary embolus extending into the right and left central, bilateral lobar and segmental branches. Left basilar opacity which may suggest atelectasis or infarct. Signs of right heart strain.    EXAM: CXR (6/3/2021)  IMPRESSION: Clear lungs Labs personally reviewed:                        12.7   8.44  )-----------( 339      ( 03 Jun 2021 17:39 )             41.2       06-03    140  |  102  |  8   ----------------------------<  91  3.8   |  18<L>  |  0.68    Ca    9.6      03 Jun 2021 17:39    TPro  8.0  /  Alb  4.3  /  TBili  0.6  /  DBili  x   /  AST  31  /  ALT  50<H>  /  AlkPhos  118  06-03      LIVER FUNCTIONS - ( 03 Jun 2021 17:39 )  Alb: 4.3 g/dL / Pro: 8.0 g/dL / ALK PHOS: 118 U/L / ALT: 50 U/L / AST: 31 U/L / GGT: x    [facility]  COMPARISON: Chest CT from 2/14/2018.  IMPRESSION: Saddle pulmonary embolus extending into the right and left central, bilateral lobar and segmental branches. Left basilar opacity which may suggest atelectasis or infarct. Signs of right heart strain.    EXAM: CXR (6/3/2021)  IMPRESSION: Clear lungs    EXAM: LEFT LE DUPLEX (6/3/2021)  IMPRESSION: No evidence of left lower extremity deep venous thrombosis.

## 2021-06-03 NOTE — CHART NOTE - NSCHARTNOTEFT_GEN_A_CORE
Padua Prediction Score for VTE Risk within 24hours of admission:    Active malignancy:                                                    [  ] YES +3, [  ] NO   Previous VTE (Excluding Superficial Vein Thrombosis): [  ] YES +3, [  ] NO  Reduced mobility:                                                     [  ] YES +3, [  ] NO  Already known thrombophilic condition:                     [  ] YES +3, [  ] NO  Recent (</=1 month trauma and/or surgery):             [  ] YES +2, [  ] NO  Elderly age (>/=70):                                                  [  ] YES +1, [  ] NO  Heart and/or Respiratory Failure:                               [  ] YES +1, [  ] NO   Acute MI and/or ischemic CVA:                                  [  ] YES +1, [  ] NO   Acute infection and/or rheumatologic disorder:          [  ] YES +1, [  ] NO   BMI>/= 30:                                                              [  ] YES +1, [  ] NO   Ongoing hormonal treatment:                                   [  ] YES +1, [  ] NO    Total Score: [  ]  points    [  ] Padua Score <  3: Low Risk of VTE         - Chemical Thromboprophylaxis should be considered on case-by-case basis  [  ] Padua Score >/= 4: High Risk of VTE         - Chemical Thromboprophylaxis is recommended for nonpregnant patients without contraindications (Major bleeding, thrombocytopenia) who are >/=  18 years of age                         VTE Prophylaxis Recommendations:  Mechanical Pneumatic Compression Devices                                [  ]  Yes,  [  ] No, Contraindicated    Chemical VTE Prophylaxis (Heparin/ Lovenox/ Fondaparinux)        [   ] Yes,  [  ] No              [ ] Contraindicated, because _____              [ ] Already receiving Systemic Anticoagulation Padua Prediction Score for VTE Risk within 24hours of admission:    Active malignancy:                                                    [  ] YES +3, [ x ] NO   Previous VTE (Excluding Superficial Vein Thrombosis): [ x ] YES +3, [  ] NO  Reduced mobility:                                                     [  ] YES +3, [ x ] NO  Already known thrombophilic condition:                     [  ] YES +3, [ x ] NO  Recent (</=1 month trauma and/or surgery):             [ x ] YES +2, [  ] NO  Elderly age (>/=70):                                                  [  ] YES +1, [ x ] NO  Heart and/or Respiratory Failure:                               [  ] YES +1, [ x ] NO   Acute MI and/or ischemic CVA:                                  [  ] YES +1, [ x ] NO   Acute infection and/or rheumatologic disorder:          [  ] YES +1, [ x ] NO   BMI>/= 30:                                                              [ x ] YES +1, [  ] NO   Ongoing hormonal treatment:                                   [  ] YES +1, [ x ] NO    Total Score: [ 6 ]  points    [  ] Padua Score <  3: Low Risk of VTE         - Chemical Thromboprophylaxis should be considered on case-by-case basis  [ x ] Padua Score >/= 4: High Risk of VTE         - Chemical Thromboprophylaxis is recommended for nonpregnant patients without contraindications (Major bleeding, thrombocytopenia) who are >/=  18 years of age                         VTE Prophylaxis Recommendations:  Mechanical Pneumatic Compression Devices                                [  ]  Yes,  [  ] No, Contraindicated -- pending LE dopplers    Chemical VTE Prophylaxis (Heparin/ Lovenox/ Fondaparinux)        [   ] Yes,  [  ] No              [ ] Contraindicated, because _____              [ x ] Already receiving Systemic Anticoagulation -- on heparin gtt for PE

## 2021-06-03 NOTE — CONSULT NOTE ADULT - SUBJECTIVE AND OBJECTIVE BOX
Patient seen and evaluated at bedside    Chief Complaint: SOB    HPI: 40F with DVT/PE 10/2018 (on Eliquis until 1/2020), morbid obesity s/p lap sleeve gastrectomy 2012 c/b hiatal hernia and gastric sleeve revision with duodenal switch and hiatal hernia repair on 5/14/21, asthma (multiple intubations, last 1992), who is here for SOB, found to have a saddle PE.     Patient with recent bariatric surgery on 5/14 and seen by hematology post-operatively and started on subcutaneous lovenox 40 mg every 12 hours due to DVT/PE risk. Patient noted left calf swelling and redness on 5/24 after trauma to her calf, and evaluated at an outside hospital - DVT scan was negative, and she was given the diagnosis of thrombophlebitis. She completed her course of subcutaneous lovenox the next day (5/25). The swelling and redness worsened and she was recommended by hematologist to restart AC with Eliquis on 5/28 but was only able to take two doses (yesterday night and this morning) as the pharmacy had to order Eliquis. Patient began having intermittent palpitations since 2 days ago and also developed shortness of breath, similar but not as severe, as with prior PE so came to ED. In ED, patient had CTA showing saddle PE with extension throughout and lung and evidence of right heart strain.    Patient completed series of Covid vaccination on 1/27/21. Patient has had prior hematologic work up with no known clotting disorders.    ED: VSS (HR 80s-90s, /87, 98% RA. Patient was started on heparin drip. D dimer 3000, trop 9, lactate negative. LE duplex negative. CTA with saddle PE and R heart strain. Currently denies any chest pain. Does have SOB on exertion and palpitations on exertion only. + L calf pain.     PMHx:   Asthma    Vertigo, benign positional    Knee dislocation, left, sequela    Meniscus tear    Polyp    Morbid obesity    Pulmonary embolism    Obesity    GERD (gastroesophageal reflux disease)    Anxiety    Pulmonary embolism    Insomnia    Left carpal tunnel syndrome    COVID-19 vaccine series completed    Cervical herniated disc    Varicose veins    Morbid obesity with BMI of 50.0-59.9, adult    Thrombocytopenia    Thrombocytosis        PSHx:   Status post laparoscopic sleeve gastrectomy    Bariatric surgery status    H/O hiatal hernia    H/O rectal polypectomy    History of weight loss surgery        Allergies:  pseudoephedrine (Other)  red dye (Vomiting)  Reglan (Other)  sulfa drugs (Hives; Anaphylaxis)      Home Meds:    Current Medications:   heparin   Injectable 06914 Unit(s) IV Push once  heparin   Injectable 49148 Unit(s) IV Push every 6 hours PRN  heparin   Injectable 5000 Unit(s) IV Push every 6 hours PRN  heparin  Infusion.  Unit(s)/Hr IV Continuous <Continuous>      FAMILY HISTORY:  Family history of colon cancer (Grandparent)  and uncle    Family history of multiple myeloma (Grandparent)    FHx: coronary artery disease (Father, Mother)    Family history of hypertension (Father, Mother)    Family history of type 2 diabetes mellitus (Father, Mother)    Family history of psoriatic arthritis (Mother)    Family history of Sjogren&#x27;s disease (Mother)    FHx: rheumatoid arthritis (Mother)        Social History: current EMT  Smoking History: never  Alcohol Use:  Drug Use:    REVIEW OF SYSTEMS:  CONSTITUTIONAL: No weakness, fevers or chills  EYES/ENT: No visual changes;  No dysphagia  NECK: No pain or stiffness  RESPIRATORY: No cough, wheezing, hemoptysis; +shortness of breath  CARDIOVASCULAR: No chest pain +palpitations with exertion; No lower extremity edema, however + L calf pain  GASTROINTESTINAL: No abdominal or epigastric pain. No nausea, vomiting, or hematemesis; No diarrhea (but slight loose stools) or constipation. No melena or hematochezia.  BACK: No back pain  GENITOURINARY: No dysuria, frequency or hematuria  NEUROLOGICAL: No numbness or weakness  SKIN: No itching, burning, rashes, or lesions   All other review of systems is negative unless indicated above.    Physical Exam:  T(F): 98.3 (06-03), Max: 98.3 (06-03)  HR: 87 (06-03) (87 - 98)  BP: 126/91 (06-03) (126/91 - 127/87)  RR: 15 (06-03)  SpO2: 98% (06-03)  GENERAL: No acute distress, well-developed  HEAD:  Atraumatic, Normocephalic  ENT: EOMI, PERRLA, conjunctiva and sclera clear, Neck supple, No JVD, moist mucosa  CHEST/LUNG: Clear to auscultation bilaterally; No wheeze, equal breath sounds bilaterally   BACK: No spinal tenderness  HEART: Regular rate and rhythm; No murmurs, rubs, or gallops  ABDOMEN: Soft, Nontender, Nondistended; Bowel sounds present  EXTREMITIES:  No clubbing, cyanosis. L calf warm, erythematous.   PSYCH: Nl behavior, nl affect  NEUROLOGY: AAOx3, non-focal, cranial nerves intact  SKIN: Normal color, No rashes or lesions  LINES:    Cardiovascular Diagnostic Testing:    ECG: Personally reviewed: NSR, normal axis, no ST/T wave abnormalities    Echo: Personally reviewed: PENDING    Stress Testing:    Cath:    Imaging:  < from: CT Angio Chest PE Protocol w/ IV Cont (06.03.21 @ 19:27) >  Saddle pulmonary embolus extending into the right and left central, bilateral lobar and segmental branches. Left basilar opacity which may suggest atelectasis or infarct. Signs of right heart strain.    < end of copied text >      CXR: Personally reviewed    Labs: Personally reviewed                        12.7   8.44  )-----------( 339      ( 03 Jun 2021 17:39 )             41.2     06-03    140  |  102  |  8   ----------------------------<  91  3.8   |  18<L>  |  0.68    Ca    9.6      03 Jun 2021 17:39    TPro  8.0  /  Alb  4.3  /  TBili  0.6  /  DBili  x   /  AST  31  /  ALT  50<H>  /  AlkPhos  118  06-03    CARDIAC MARKERS ( 03 Jun 2021 17:39 )  9 ng/L / x     / x     / x     / x     / x

## 2021-06-03 NOTE — ED PROVIDER NOTE - PMH
Anxiety    Asthma  intubated 4 times. Last time at age 12 (1992)  Cervical herniated disc  C 5 and 6  COVID-19 vaccine series completed  moderna, completed 1/27/21  GERD (gastroesophageal reflux disease)    Insomnia  difficulty falling asleep since bariatric surgery May 2021  Knee dislocation, left, sequela  2010  Left carpal tunnel syndrome    Morbid obesity with BMI of 50.0-59.9, adult    Pulmonary embolism  October 2018 had 5 PEs, work up negative, off anticoagulation as of 1/2020 but placed on lovenox post-op after abdominal surgery 5/14/21  Thrombocytosis    Varicose veins    Vertigo, benign positional

## 2021-06-03 NOTE — H&P ADULT - HISTORY OF PRESENT ILLNESS
Patient is a 40 year old woman with PMH asthma (multiple intubations, last 1992), idiopathic PE in 2018 (on Xarelto till 1/2020), morbid obesity s/p lap sleeve gastrectomy in 2012 complicated by hiatal hernia and gastric sleeve revision with duodenal switch and hiatal hernia repair on 5/14/21 who presents with shortness of breath and found to have saddle PE. Patient with recent bariatric surgery and seen by hematology post-operatively and started on subcutaneous lovenox 40 mg every 12 hours due to DVT/PE risk. Patient noted left calf swelling and redness on 5/24 and evaluated at an outside hospital where was given diagnosis of thrombophlebitis. She completed her course of subcutaneous lovenox the next day (5/25). The swelling and redness worsened and she was recommended by hematologist to restart AC with Eliquis on 5/28 but was only able to take two doses due to insurance issues. Patient began having intermittent chest pain and palpitations since 2 days ago and also developed shortness of breath, similar but not as severe, as with prior PE so came to ED. In ED, patient had CTA showing saddle PE with extension throughout and lung and evidence of right heart strain.    Patient completed series of Covid vaccination on 1/27/21. Patient has had prior hematologic work up with no known clotting disorders. Patient is a 40 year old woman with PMH asthma (multiple intubations, last 1992), idiopathic PE in 2018 (on Xarelto till 1/2020), morbid obesity s/p lap sleeve gastrectomy in 2012 and gastric sleeve revision with duodenal switch and hiatal hernia repair on 5/14/21 who presents with shortness of breath and found to have saddle PE. Patient with recent bariatric surgery and seen by hematology post-operatively and started on subcutaneous lovenox 40 mg every 12 hours due to DVT/PE risk. Patient noted left calf swelling and redness on 5/24 and evaluated at an outside hospital where was given diagnosis of thrombophlebitis. She completed her course of subcutaneous lovenox the next day (5/25). The swelling and redness worsened and she was recommended by hematologist to restart AC with Eliquis on 5/28 but was only able to take two doses due to insurance issues. Patient began having intermittent chest pain and palpitations since 2 days ago and also developed shortness of breath, similar but not as severe, as with prior PE so came to ED. In ED, patient had CTA showing saddle PE with extension throughout and lung and evidence of right heart strain.    Patient completed series of Covid vaccination on 1/27/21. Patient has had prior hematologic work up with no known clotting disorders.

## 2021-06-03 NOTE — ED ADULT NURSE NOTE - OBJECTIVE STATEMENT
4oyo F, hx obesity, PE in past ( was not on A/C x 1 year, started back on eliquis 2 days ago), hx bariatric surgery 5/14 with complication of phlebitis to L calf shortly after. comes to ED with c/o SOB, palpitations, chest discomfort, dizziness since yesterday. pt states she had similar symptoms at onset of calf symptoms and states " I do not believe the doppler results". pt is verbal in full sentences, not hypoxic tachycardic pale or diaphoretic. relaxed posture in stretcher. CM in place, ekg completed, pending MD magana, assessment will be ongoing

## 2021-06-03 NOTE — ED PROVIDER NOTE - OBJECTIVE STATEMENT
40 year old female w/ h/o DVT/PE s/p laparoscopic Re-sleeve gastrectomy with modified duodenal switch without any complications on 5/14 presents to ED c/o L calf pain redness and swelling starting 5/24. She was seen at OSH that day dx thrombophlebitis. Pt reports worsening L calf pain w/ redness and swelling. 2 days ago had onset of intermittent chest pain w/ palpitations and SOB feeling less intense but similar to prior PE. Of note pt was previously on Eliquis for 1 year after PE, after surgery was placed on Lovenox last dose 5/25. Spoke to her hematologist 5/28 who advised pt to restart Eliquis but has only taken 2 doses secondary to issues at pharmacy. Reports feeling well since bariatric surgery. Denies HA, dizziness, fevers, cough, abd pain, n/v/d

## 2021-06-03 NOTE — ED ADULT NURSE REASSESSMENT NOTE - NS ED NURSE REASSESS COMMENT FT1
Report received from Dewayne GROSS. Patient sitting comfortably in bed, denies complaints at this time. Safety and comfort measures maintained.

## 2021-06-03 NOTE — ED PROVIDER NOTE - PROGRESS NOTE DETAILS
Received call from rads, pt w/ saddle PE w/ extension throughout lung w/ signs of R heart strain. Called PERT team who will see pt shortly, rec heparin. Spoke to sx as well given recent bariatric procedure. State will see pt but ask pt surgeon be contacted as well to make aware and for recommendations. Left message with on-call for Dr. Kay in regards to finding PE and recommendations for heparin   Loreta Williamson PA-C Spoke to pts surgeon Dr. Kay and made him aware of saddle PE w/ extension throughout lung and signs of R heart strain. Discussed heparin w/ recent surgery and Dr. Kay states pt can be heparinized, awaiting pt weight for order   Loreta Williamson PA-C Attending MD Chen: Spoke with [facility], will admit to their service, requested BNP and lactate and Echo, all ordered.  WIll admit

## 2021-06-04 ENCOUNTER — APPOINTMENT (OUTPATIENT)
Dept: ORTHOPEDIC SURGERY | Facility: HOSPITAL | Age: 41
End: 2021-06-04

## 2021-06-04 LAB
A1C WITH ESTIMATED AVERAGE GLUCOSE RESULT: 5.3 % — SIGNIFICANT CHANGE UP (ref 4–5.6)
ALBUMIN SERPL ELPH-MCNC: 3.5 G/DL — SIGNIFICANT CHANGE UP (ref 3.3–5)
ALP SERPL-CCNC: 99 U/L — SIGNIFICANT CHANGE UP (ref 40–120)
ALT FLD-CCNC: 41 U/L — SIGNIFICANT CHANGE UP (ref 10–45)
ANION GAP SERPL CALC-SCNC: 18 MMOL/L — HIGH (ref 5–17)
APTT BLD: 113.7 SEC — HIGH (ref 27.5–35.5)
APTT BLD: >200 SEC — CRITICAL HIGH (ref 27.5–35.5)
APTT BLD: >200 SEC — CRITICAL HIGH (ref 27.5–35.5)
AST SERPL-CCNC: 30 U/L — SIGNIFICANT CHANGE UP (ref 10–40)
BASE EXCESS BLDV CALC-SCNC: -3.4 MMOL/L — LOW (ref -2–2)
BASOPHILS # BLD AUTO: 0.04 K/UL — SIGNIFICANT CHANGE UP (ref 0–0.2)
BASOPHILS NFR BLD AUTO: 0.5 % — SIGNIFICANT CHANGE UP (ref 0–2)
BILIRUB SERPL-MCNC: 0.5 MG/DL — SIGNIFICANT CHANGE UP (ref 0.2–1.2)
BLD GP AB SCN SERPL QL: NEGATIVE — SIGNIFICANT CHANGE UP
BUN SERPL-MCNC: 6 MG/DL — LOW (ref 7–23)
CA-I SERPL-SCNC: 1.18 MMOL/L — SIGNIFICANT CHANGE UP (ref 1.12–1.3)
CALCIUM SERPL-MCNC: 8.8 MG/DL — SIGNIFICANT CHANGE UP (ref 8.4–10.5)
CHLORIDE BLDV-SCNC: 107 MMOL/L — SIGNIFICANT CHANGE UP (ref 96–108)
CHLORIDE SERPL-SCNC: 103 MMOL/L — SIGNIFICANT CHANGE UP (ref 96–108)
CHOLEST SERPL-MCNC: 143 MG/DL — SIGNIFICANT CHANGE UP
CO2 BLDV-SCNC: 21 MMOL/L — LOW (ref 22–30)
CO2 SERPL-SCNC: 16 MMOL/L — LOW (ref 22–31)
COVID-19 NUCLEOCAPSID GAM AB INTERP: NEGATIVE — SIGNIFICANT CHANGE UP
COVID-19 NUCLEOCAPSID TOTAL GAM ANTIBODY RESULT: 0.09 INDEX — SIGNIFICANT CHANGE UP
COVID-19 SPIKE DOMAIN AB INTERP: POSITIVE
COVID-19 SPIKE DOMAIN ANTIBODY RESULT: >250 U/ML — HIGH
CREAT SERPL-MCNC: 0.54 MG/DL — SIGNIFICANT CHANGE UP (ref 0.5–1.3)
EOSINOPHIL # BLD AUTO: 0.22 K/UL — SIGNIFICANT CHANGE UP (ref 0–0.5)
EOSINOPHIL NFR BLD AUTO: 2.8 % — SIGNIFICANT CHANGE UP (ref 0–6)
ESTIMATED AVERAGE GLUCOSE: 105 MG/DL — SIGNIFICANT CHANGE UP (ref 68–114)
GAS PNL BLDV: 135 MMOL/L — SIGNIFICANT CHANGE UP (ref 135–145)
GAS PNL BLDV: SIGNIFICANT CHANGE UP
GAS PNL BLDV: SIGNIFICANT CHANGE UP
GLUCOSE BLDV-MCNC: 121 MG/DL — HIGH (ref 70–99)
GLUCOSE SERPL-MCNC: 121 MG/DL — HIGH (ref 70–99)
HCO3 BLDV-SCNC: 20 MMOL/L — LOW (ref 21–29)
HCT VFR BLD CALC: 36.6 % — SIGNIFICANT CHANGE UP (ref 34.5–45)
HCT VFR BLD CALC: 37.9 % — SIGNIFICANT CHANGE UP (ref 34.5–45)
HCT VFR BLDA CALC: 37 % — LOW (ref 39–50)
HDLC SERPL-MCNC: 38 MG/DL — LOW
HGB BLD CALC-MCNC: 12 G/DL — SIGNIFICANT CHANGE UP (ref 11.5–15.5)
HGB BLD-MCNC: 11.2 G/DL — LOW (ref 11.5–15.5)
HGB BLD-MCNC: 11.3 G/DL — LOW (ref 11.5–15.5)
HOROWITZ INDEX BLDV+IHG-RTO: 21 — SIGNIFICANT CHANGE UP
IMM GRANULOCYTES NFR BLD AUTO: 0.3 % — SIGNIFICANT CHANGE UP (ref 0–1.5)
INR BLD: 1.81 RATIO — HIGH (ref 0.88–1.16)
LACTATE BLDV-MCNC: 0.8 MMOL/L — SIGNIFICANT CHANGE UP (ref 0.7–2)
LIPID PNL WITH DIRECT LDL SERPL: 95 MG/DL — SIGNIFICANT CHANGE UP
LYMPHOCYTES # BLD AUTO: 2.19 K/UL — SIGNIFICANT CHANGE UP (ref 1–3.3)
LYMPHOCYTES # BLD AUTO: 28 % — SIGNIFICANT CHANGE UP (ref 13–44)
MAGNESIUM SERPL-MCNC: 1.7 MG/DL — SIGNIFICANT CHANGE UP (ref 1.6–2.6)
MCHC RBC-ENTMCNC: 24.1 PG — LOW (ref 27–34)
MCHC RBC-ENTMCNC: 24.3 PG — LOW (ref 27–34)
MCHC RBC-ENTMCNC: 29.8 GM/DL — LOW (ref 32–36)
MCHC RBC-ENTMCNC: 30.6 GM/DL — LOW (ref 32–36)
MCV RBC AUTO: 79.6 FL — LOW (ref 80–100)
MCV RBC AUTO: 80.8 FL — SIGNIFICANT CHANGE UP (ref 80–100)
MONOCYTES # BLD AUTO: 0.58 K/UL — SIGNIFICANT CHANGE UP (ref 0–0.9)
MONOCYTES NFR BLD AUTO: 7.4 % — SIGNIFICANT CHANGE UP (ref 2–14)
NEUTROPHILS # BLD AUTO: 4.77 K/UL — SIGNIFICANT CHANGE UP (ref 1.8–7.4)
NEUTROPHILS NFR BLD AUTO: 61 % — SIGNIFICANT CHANGE UP (ref 43–77)
NON HDL CHOLESTEROL: 105 MG/DL — SIGNIFICANT CHANGE UP
NRBC # BLD: 0 /100 WBCS — SIGNIFICANT CHANGE UP (ref 0–0)
NRBC # BLD: 0 /100 WBCS — SIGNIFICANT CHANGE UP (ref 0–0)
NT-PROBNP SERPL-SCNC: 115 PG/ML — SIGNIFICANT CHANGE UP (ref 0–300)
OTHER CELLS CSF MANUAL: 15 ML/DL — LOW (ref 18–22)
PCO2 BLDV: 34 MMHG — LOW (ref 35–50)
PH BLDV: 7.4 — SIGNIFICANT CHANGE UP (ref 7.35–7.45)
PHOSPHATE SERPL-MCNC: 3 MG/DL — SIGNIFICANT CHANGE UP (ref 2.5–4.5)
PLATELET # BLD AUTO: 290 K/UL — SIGNIFICANT CHANGE UP (ref 150–400)
PLATELET # BLD AUTO: 291 K/UL — SIGNIFICANT CHANGE UP (ref 150–400)
PO2 BLDV: 68 MMHG — HIGH (ref 25–45)
POTASSIUM BLDV-SCNC: 3.3 MMOL/L — LOW (ref 3.5–5.3)
POTASSIUM SERPL-MCNC: 3.8 MMOL/L — SIGNIFICANT CHANGE UP (ref 3.5–5.3)
POTASSIUM SERPL-SCNC: 3.8 MMOL/L — SIGNIFICANT CHANGE UP (ref 3.5–5.3)
PROT SERPL-MCNC: 6.7 G/DL — SIGNIFICANT CHANGE UP (ref 6–8.3)
PROTHROM AB SERPL-ACNC: 21.1 SEC — HIGH (ref 10.6–13.6)
RBC # BLD: 4.6 M/UL — SIGNIFICANT CHANGE UP (ref 3.8–5.2)
RBC # BLD: 4.69 M/UL — SIGNIFICANT CHANGE UP (ref 3.8–5.2)
RBC # FLD: 18.1 % — HIGH (ref 10.3–14.5)
RBC # FLD: 18.3 % — HIGH (ref 10.3–14.5)
RH IG SCN BLD-IMP: POSITIVE — SIGNIFICANT CHANGE UP
SAO2 % BLDV: 92 % — HIGH (ref 67–88)
SARS-COV-2 IGG+IGM SERPL QL IA: 0.09 INDEX — SIGNIFICANT CHANGE UP
SARS-COV-2 IGG+IGM SERPL QL IA: >250 U/ML — HIGH
SARS-COV-2 IGG+IGM SERPL QL IA: NEGATIVE — SIGNIFICANT CHANGE UP
SARS-COV-2 IGG+IGM SERPL QL IA: POSITIVE
SARS-COV-2 RNA SPEC QL NAA+PROBE: SIGNIFICANT CHANGE UP
SODIUM SERPL-SCNC: 137 MMOL/L — SIGNIFICANT CHANGE UP (ref 135–145)
TRIGL SERPL-MCNC: 50 MG/DL — SIGNIFICANT CHANGE UP
TROPONIN T, HIGH SENSITIVITY RESULT: 8 NG/L — SIGNIFICANT CHANGE UP (ref 0–51)
TSH SERPL-MCNC: 2.59 UIU/ML — SIGNIFICANT CHANGE UP (ref 0.27–4.2)
WBC # BLD: 7.02 K/UL — SIGNIFICANT CHANGE UP (ref 3.8–10.5)
WBC # BLD: 7.82 K/UL — SIGNIFICANT CHANGE UP (ref 3.8–10.5)
WBC # FLD AUTO: 7.02 K/UL — SIGNIFICANT CHANGE UP (ref 3.8–10.5)
WBC # FLD AUTO: 7.82 K/UL — SIGNIFICANT CHANGE UP (ref 3.8–10.5)

## 2021-06-04 PROCEDURE — 93321 DOPPLER ECHO F-UP/LMTD STD: CPT | Mod: 26

## 2021-06-04 PROCEDURE — 99291 CRITICAL CARE FIRST HOUR: CPT

## 2021-06-04 PROCEDURE — 93971 EXTREMITY STUDY: CPT | Mod: 26,RT

## 2021-06-04 PROCEDURE — 99223 1ST HOSP IP/OBS HIGH 75: CPT | Mod: GC

## 2021-06-04 PROCEDURE — 93308 TTE F-UP OR LMTD: CPT | Mod: 26

## 2021-06-04 RX ORDER — HEPARIN SODIUM 5000 [USP'U]/ML
1900 INJECTION INTRAVENOUS; SUBCUTANEOUS
Qty: 25000 | Refills: 0 | Status: DISCONTINUED | OUTPATIENT
Start: 2021-06-04 | End: 2021-06-04

## 2021-06-04 RX ORDER — PANTOPRAZOLE SODIUM 20 MG/1
40 TABLET, DELAYED RELEASE ORAL
Refills: 0 | Status: DISCONTINUED | OUTPATIENT
Start: 2021-06-04 | End: 2021-06-06

## 2021-06-04 RX ORDER — MULTIVIT-MIN/FERROUS GLUCONATE 9 MG/15 ML
30 LIQUID (ML) ORAL DAILY
Refills: 0 | Status: DISCONTINUED | OUTPATIENT
Start: 2021-06-04 | End: 2021-06-06

## 2021-06-04 RX ORDER — MULTIVIT-MIN/FERROUS GLUCONATE 9 MG/15 ML
15 LIQUID (ML) ORAL DAILY
Refills: 0 | Status: DISCONTINUED | OUTPATIENT
Start: 2021-06-04 | End: 2021-06-04

## 2021-06-04 RX ORDER — ONDANSETRON 8 MG/1
4 TABLET, FILM COATED ORAL ONCE
Refills: 0 | Status: COMPLETED | OUTPATIENT
Start: 2021-06-04 | End: 2021-06-04

## 2021-06-04 RX ORDER — POTASSIUM CHLORIDE 20 MEQ
20 PACKET (EA) ORAL ONCE
Refills: 0 | Status: COMPLETED | OUTPATIENT
Start: 2021-06-04 | End: 2021-06-04

## 2021-06-04 RX ORDER — HEPARIN SODIUM 5000 [USP'U]/ML
1600 INJECTION INTRAVENOUS; SUBCUTANEOUS
Qty: 25000 | Refills: 0 | Status: DISCONTINUED | OUTPATIENT
Start: 2021-06-04 | End: 2021-06-05

## 2021-06-04 RX ORDER — ACETAMINOPHEN 500 MG
1000 TABLET ORAL ONCE
Refills: 0 | Status: COMPLETED | OUTPATIENT
Start: 2021-06-04 | End: 2021-06-04

## 2021-06-04 RX ORDER — CALCIUM CARBONATE 500(1250)
1 TABLET ORAL DAILY
Refills: 0 | Status: DISCONTINUED | OUTPATIENT
Start: 2021-06-04 | End: 2021-06-06

## 2021-06-04 RX ORDER — MAGNESIUM SULFATE 500 MG/ML
2 VIAL (ML) INJECTION ONCE
Refills: 0 | Status: COMPLETED | OUTPATIENT
Start: 2021-06-04 | End: 2021-06-04

## 2021-06-04 RX ORDER — CHOLECALCIFEROL (VITAMIN D3) 125 MCG
1000 CAPSULE ORAL DAILY
Refills: 0 | Status: DISCONTINUED | OUTPATIENT
Start: 2021-06-04 | End: 2021-06-06

## 2021-06-04 RX ORDER — OXYCODONE HYDROCHLORIDE 5 MG/1
5 TABLET ORAL EVERY 6 HOURS
Refills: 0 | Status: DISCONTINUED | OUTPATIENT
Start: 2021-06-04 | End: 2021-06-06

## 2021-06-04 RX ORDER — MOMETASONE FUROATE 220 UG/1
1 INHALANT RESPIRATORY (INHALATION) DAILY
Refills: 0 | Status: DISCONTINUED | OUTPATIENT
Start: 2021-06-04 | End: 2021-06-06

## 2021-06-04 RX ORDER — WARFARIN SODIUM 2.5 MG/1
5 TABLET ORAL ONCE
Refills: 0 | Status: DISCONTINUED | OUTPATIENT
Start: 2021-06-04 | End: 2021-06-04

## 2021-06-04 RX ORDER — HEPARIN SODIUM 5000 [USP'U]/ML
1600 INJECTION INTRAVENOUS; SUBCUTANEOUS
Qty: 25000 | Refills: 0 | Status: DISCONTINUED | OUTPATIENT
Start: 2021-06-04 | End: 2021-06-04

## 2021-06-04 RX ORDER — MORPHINE SULFATE 50 MG/1
2 CAPSULE, EXTENDED RELEASE ORAL ONCE
Refills: 0 | Status: DISCONTINUED | OUTPATIENT
Start: 2021-06-04 | End: 2021-06-04

## 2021-06-04 RX ORDER — WARFARIN SODIUM 2.5 MG/1
7.5 TABLET ORAL ONCE
Refills: 0 | Status: COMPLETED | OUTPATIENT
Start: 2021-06-04 | End: 2021-06-04

## 2021-06-04 RX ADMIN — MORPHINE SULFATE 2 MILLIGRAM(S): 50 CAPSULE, EXTENDED RELEASE ORAL at 09:04

## 2021-06-04 RX ADMIN — Medication 1000 UNIT(S): at 12:54

## 2021-06-04 RX ADMIN — Medication 1000 MILLIGRAM(S): at 06:00

## 2021-06-04 RX ADMIN — HEPARIN SODIUM 19 UNIT(S)/HR: 5000 INJECTION INTRAVENOUS; SUBCUTANEOUS at 03:45

## 2021-06-04 RX ADMIN — ONDANSETRON 4 MILLIGRAM(S): 8 TABLET, FILM COATED ORAL at 21:26

## 2021-06-04 RX ADMIN — MORPHINE SULFATE 2 MILLIGRAM(S): 50 CAPSULE, EXTENDED RELEASE ORAL at 00:00

## 2021-06-04 RX ADMIN — Medication 20 MILLIEQUIVALENT(S): at 04:45

## 2021-06-04 RX ADMIN — PANTOPRAZOLE SODIUM 40 MILLIGRAM(S): 20 TABLET, DELAYED RELEASE ORAL at 17:10

## 2021-06-04 RX ADMIN — OXYCODONE HYDROCHLORIDE 5 MILLIGRAM(S): 5 TABLET ORAL at 15:50

## 2021-06-04 RX ADMIN — Medication 50 GRAM(S): at 04:45

## 2021-06-04 RX ADMIN — ONDANSETRON 4 MILLIGRAM(S): 8 TABLET, FILM COATED ORAL at 09:03

## 2021-06-04 RX ADMIN — Medication 400 MILLIGRAM(S): at 05:30

## 2021-06-04 RX ADMIN — ONDANSETRON 4 MILLIGRAM(S): 8 TABLET, FILM COATED ORAL at 04:59

## 2021-06-04 RX ADMIN — HEPARIN SODIUM 16 UNIT(S)/HR: 5000 INJECTION INTRAVENOUS; SUBCUTANEOUS at 12:33

## 2021-06-04 RX ADMIN — PANTOPRAZOLE SODIUM 40 MILLIGRAM(S): 20 TABLET, DELAYED RELEASE ORAL at 06:18

## 2021-06-04 RX ADMIN — OXYCODONE HYDROCHLORIDE 5 MILLIGRAM(S): 5 TABLET ORAL at 15:27

## 2021-06-04 RX ADMIN — MORPHINE SULFATE 2 MILLIGRAM(S): 50 CAPSULE, EXTENDED RELEASE ORAL at 00:15

## 2021-06-04 RX ADMIN — CHLORHEXIDINE GLUCONATE 1 APPLICATION(S): 213 SOLUTION TOPICAL at 06:18

## 2021-06-04 RX ADMIN — PANTOPRAZOLE SODIUM 40 MILLIGRAM(S): 20 TABLET, DELAYED RELEASE ORAL at 00:30

## 2021-06-04 RX ADMIN — MORPHINE SULFATE 2 MILLIGRAM(S): 50 CAPSULE, EXTENDED RELEASE ORAL at 09:32

## 2021-06-04 NOTE — CONSULT NOTE ADULT - TIME BILLING
Patient seen and examined, agree with the above assessment and plan by MAURICIO Ferrera.  40F Hx asthma w/ multiple prior intubations, idiopathic PE in 2018 (on Xarelto till 1/2020 - unclear prior APLS diagnosis), morbid obesity s/p lap sleeve gastrectomy in 2012 w/ recent revision on 5/14 who p/w saddle PE w/ RV strain although HD stable on RA, currently on systemic Heparin with bridge to Coumadin   CV stable  Cont AC  Heme eval  ECHO w/ right ventricular enlargement w/ normal RV function.

## 2021-06-04 NOTE — CONSULT NOTE ADULT - ASSESSMENT
39 y/o female with a PMH of asthma (intubated 4 times, last in 1992), PE in October of 2018 (off all anticoagulation since 1/2020), GERD, morbid obesity s/p sleeve gastrectomy (2012, in [place]), s/p upper endoscopy, laparoscopic gastrectomy revision, and duodenal switch (5/14/21, Dr. Kay, [facility]), now presenting with pulmonary embolism.    - No objection to heparinization from surgical perspective  - Continue bariatric diet  - Dr. Kay notified by ED    Discussed with Dr. Lopez, MIS fellow.    Kathy Pereira, PGY2  Green Team Surgery [phone]  
Echo     A/P:   40F Hx asthma w/ multiple prior intubations, idiopathic PE in 2018 (on Xarelto till 1/2020 - unclear prior APLS diagnosis), morbid obesity s/p lap sleeve gastrectomy in 2012 w/ recent revision on 5/14 who p/w saddle PE w/ RV strain although HD stable on RA, currently on systemic Heparin with bridge to Coumadin     1. Saddle PE w/ RV strain   - Remains HD stable on RA in NAD  - Systemic Heparin, bridge to Coumadin   - Heme consulted    -repeat echo w/ right ventricular enlargement w/ normal RV function.   - Vascular cards following     2. LLE phlebitis  -med f/u     
40F with DVT/PE 10/2018 (on Eliquis until 1/2020), morbid obesity s/p lap sleeve gastrectomy 2012 c/b hiatal hernia and gastric sleeve revision with duodenal switch and hiatal hernia repair on 5/14/21, asthma (multiple intubations, last 1992), who is here for SOB, found to have a saddle PE.     Saddle PE: Patient has evidence of saddle PE on CT. Negative LE duplex. trop negative. lactate negative. Patient of note is currently hemodynamically stable.   - heparin bolus and drip  - will obtain echo   - order proBNP  - if patient becomes hemodynamically unstable, can potentially give tPA (is > 14 days post op), however would be at high risk of bleeding    Dispo: CICU for hemodynamic monitoring    Discussed with Dr. Casa Sarkar MD  Cardiology Fellow, PGY-4  [phone]  For all other Cardiology service contact information, go to [email or web address] and use "[email or web address]" to login.
Patient is a 41 y/o F w/ a PMHx of asthma (multiple intubations, last 1992), idiopathic PE in 2018 (previously on Xarelto till 1/2020), and morbid obesity s/p lap sleeve gastrectomy in 2012 and gastric sleeve revision with duodenal switch and hiatal hernia repair on 5/14/21 who presented with shortness of breath, found to have saddle PE, and was admitted to the CCU for further management. Hematology was consulted for further evaluation.    # Saddle PE  - Pt has a history of an extensive bilateral unprovoked PE in 10/2019. She underwent a hypercoagulable workup at the time including anticardiolipin antibody testing (negative), normal Protein C and S, and normal Factor V Leiden + Prothrombin gene mutation testing. Overall, there was no evidence of a hypercoagulable state. Patient was teated with a year of Eliquis.   - Pt now found to have a new saddle PE in the setting of recent surgery. B/L LE U/S showed no evidence of a DVT  - Pt currently HDS and is saturating well on RA. Appreciate care as per CCU team  - Pt currently on a Heparin gtt. Appreciate Vascular Cardiology evaluation. In the setting of recent bariatric surgery, agree with bridging to Coumadin with goal INR 2-3.   - Primary Hematologist: Dr. Yan Monahan. Continue outpatient follow-up    Monty Frey, PGY5  Hematology-Oncology Fellow  Pager: [phone] / [phone]

## 2021-06-04 NOTE — PROGRESS NOTE ADULT - ASSESSMENT
======================= DISPOSITION  =====================  [ ] Critical   [ ] Guarded    [ ] Stable    [ ] Maintain in CICU  [ ] Downgrade to Telemtry  [ ] Discharge Home A/P: 40F Hx asthma w/ multiple prior intubations, idiopathic PE in 2018 (on Xarelto till 1/2020 - unclear prior APLS diagnosis), morbid obesity s/p lap sleeve gastrectomy in 2012 w/ recent revision on 5/14 who p/w saddle PE w/ RV strain although HD stable on RA, currently on systemic Heparin with bridge to Coumadin     #Saddle PE w/ RV strain   - Remains HD stable on RA in NAD  - Systemic Heparin, bridge to Coumadin   - Heme consulted (House) f/u recs   - Dr. Monahan (outpt Heme) called/aware of admission   - f/u repeat TTE today to re-eval RVF  - Vascular cards to follow     #Phlebitis LLE  - Warm compresses, pain management     #Infertility pending outpatient IVF  - Hold off IVF x 3 months given RV strain   - Cards OB outpatient f/u (Dr. Campos) - close monitoring/repeat echo  ======================= DISPOSITION  =====================  [ ] Critical   [ ] Guarded    [X] Stable    [ ] Maintain in CICU  [X] Downgrade to Telemetry  [ ] Discharge Home

## 2021-06-04 NOTE — CONSULT NOTE ADULT - SUBJECTIVE AND OBJECTIVE BOX
CARDIOLOGY CONSULT - Dr. Tavarez     CHIEF COMPLAINT: PE     HPI:  40 year old woman with PMH asthma (multiple intubations, last 1992), idiopathic PE in 2018 (on Xarelto till 1/2020), morbid obesity s/p lap sleeve gastrectomy in 2012 and gastric sleeve revision with duodenal switch and hiatal hernia repair on 5/14/21 who presents with shortness of breath and found to have saddle PE. Patient with recent bariatric surgery and seen by hematology post-operatively and started on subcutaneous lovenox 40 mg every 12 hours due to DVT/PE risk. Patient noted left calf swelling and redness on 5/24 and evaluated at an outside hospital where was given diagnosis of thrombophlebitis. She completed her course of subcutaneous lovenox the next day (5/25). The swelling and redness worsened and she was recommended by hematologist to restart AC with Eliquis on 5/28 but was only able to take two doses due to insurance issues. Patient began having intermittent chest pain and palpitations since 2 days ago and also developed shortness of breath, similar but not as severe, as with prior PE so came to ED. In ED, patient had CTA showing saddle PE with extension throughout and lung and evidence of right heart strain.    Patient completed series of Covid vaccination on 1/27/21. Patient has had prior hematologic work up with no known clotting disorders.  Seen and examined , resting comfortably .       PAST MEDICAL & SURGICAL HISTORY:  Asthma  intubated 4 times. Last time at age 12 (1992)    Vertigo, benign positional    Knee dislocation, left, sequela  2010    Pulmonary embolism  October 2018 had 5 PEs, work up negative, off anticoagulation as of 1/2020 but placed on lovenox post-op after abdominal surgery 5/14/21    GERD (gastroesophageal reflux disease)    Anxiety    Insomnia  difficulty falling asleep since bariatric surgery May 2021    Left carpal tunnel syndrome    COVID-19 vaccine series completed  moderna, completed 1/27/21    Cervical herniated disc  C 5 and 6    Varicose veins    Morbid obesity with BMI of 50.0-59.9, adult    Thrombocytosis    Status post laparoscopic sleeve gastrectomy  2012    H/O hiatal hernia  2012 during gastric sleeve surgery    H/O rectal polypectomy  2012 benign    History of weight loss surgery  sleeve gastrectomy revision, YANCI procedure and hiatal hernia repair 5/14/21            PREVIOUS DIAGNOSTIC TESTING:    [ ] Echocardiogram:< from: TTE with Doppler (w/Cont) (06.03.21 @ 20:21) >  Conclusions:  1. Increased relative wall thickness with normal left  ventricular mass index, consistent with concentric left  ventricular remodeling.  2. Endocardial visualization enhanced with intravenous  injection of Ultrasonic Enhancing Agent (Definity).  Hyperdynamic left ventricular systolic function.  3. Normal diastolic function  4. The right ventricle is not well visualized; grossly  right ventricular enlargement with normal right ventricular  systolic function.  5. No pericardial effusion seen.    < end of copied text >     [ ]  Catheterization:   [ ] Stress Test:  	    MEDICATIONS:  HOME MEDICATIONS:  Arnuity Ellipta 100 mcg inhalation powder: 1 puff(s) inhaled every 24 hours (26 May 2021 10:51)  calcium carbonate 550 mg oral tablet, chewable: 1 tab(s) orally once a day (04 Jun 2021 06:25)  Centrum Chewables Adults oral tablet, chewable: 1 tab(s) orally 4 times a day (26 May 2021 10:51)  omeprazole 40 mg oral delayed release capsule: 1 cap(s) orally 2 times a day (26 May 2021 10:51)  Vitamin D3 1000 intl units (25 mcg) oral tablet, chewable: 1 tab(s) orally once a day (04 Jun 2021 06:22)  Xanax 0.5 mg oral tablet: 1/2 tab(s) orally once a day (at bedtime), As Needed - for anxiety, for insomnia (26 May 2021 10:51)      MEDICATIONS  (STANDING):  calcium carbonate    500 mG (Tums) Chewable 1 Tablet(s) Chew daily  cholecalciferol 1000 Unit(s) Oral daily  heparin  Infusion 1600 Unit(s)/Hr (16 mL/Hr) IV Continuous <Continuous>  mometasone 220 MICROgram(s) Inhaler 1 Puff(s) Inhalation daily  multivitamin/minerals Oral Solution (WELLESSE) 30 milliLiter(s) Oral daily  pantoprazole  Injectable 40 milliGRAM(s) IV Push two times a day  warfarin 7.5 milliGRAM(s) Oral once          FAMILY HISTORY:  Family history of colon cancer (Grandparent)  and uncle    Family history of multiple myeloma (Grandparent)    FHx: coronary artery disease (Father, Mother)    Family history of hypertension (Father, Mother)    Family history of type 2 diabetes mellitus (Father, Mother)    Family history of psoriatic arthritis (Mother)    Family history of Sjogren&#x27;s disease (Mother)    FHx: rheumatoid arthritis (Mother)        SOCIAL HISTORY:    [x ] Non-smoker  [ ] Smoker  [ ] Alcohol    Allergies    sulfa drugs (Hives; Anaphylaxis)    Intolerances    pseudoephedrine (Other)  red dye (Vomiting)  Reglan (Other)  	    REVIEW OF SYSTEMS:  CONSTITUTIONAL: No fever, weight loss, or fatigue  EYES: No eye pain, visual disturbances, or discharge  ENMT:  No difficulty hearing, tinnitus, vertigo; No sinus or throat pain  NECK: No pain or stiffness  RESPIRATORY: No cough, wheezing, chills or hemoptysis; N+Shortness of Breath  CARDIOVASCULAR: No chest pain, palpitations, passing out, dizziness, or leg swelling  GASTROINTESTINAL: No abdominal or epigastric pain. No nausea, vomiting, or hematemesis; No diarrhea or constipation. No melena or hematochezia.  GENITOURINARY: No dysuria, frequency, hematuria, or incontinence  NEUROLOGICAL: No headaches, memory loss, loss of strength, numbness, or tremors  SKIN: No itching, burning, rashes, or lesions   	    [x ] All others negative	see hpi  [ ] Unable to obtain    PHYSICAL EXAM:  T(C): 36.5 (06-04-21 @ 09:00), Max: 36.9 (06-03-21 @ 22:23)  HR: 70 (06-04-21 @ 12:00) (58 - 98)  BP: 96/59 (06-04-21 @ 06:00) (96/59 - 127/87)  RR: 22 (06-04-21 @ 12:00) (15 - 45)  SpO2: 96% (06-04-21 @ 11:00) (87% - 98%)  Wt(kg): --  I&O's Summary    03 Jun 2021 07:01  -  04 Jun 2021 07:00  --------------------------------------------------------  IN: 210 mL / OUT: 0 mL / NET: 210 mL    04 Jun 2021 07:01  -  04 Jun 2021 14:18  --------------------------------------------------------  IN: 70 mL / OUT: 0 mL / NET: 70 mL        Appearance: Normal	  Psychiatry: A & O x 3, Mood & affect appropriate  HEENT:   Normal oral mucosa, PERRL, EOMI	  Lymphatic: No lymphadenopathy  Cardiovascular: Normal S1 S2,RRR, No JVD, No murmurs  Respiratory: Lungs clear to auscultation	  Gastrointestinal:  Soft, Non-tender, + BS	  Skin: No rashes, No ecchymoses, No cyanosis	  Neurologic: Non-focal  Extremities: Normal range of motion, No clubbing, cyanosis or edema  Vascular: Peripheral pulses palpable 2+ bilaterally    TELEMETRY: nsr 	    ECG:  	  RADIOLOGY:  OTHER: 	  < from: CT Angio Chest PE Protocol w/ IV Cont (06.03.21 @ 19:27) >    Saddle pulmonary embolus extending into the right and left central, bilateral lobar and segmental branches. Left basilar opacity which may suggest atelectasis or infarct. Signs of right heart strain.    These findings were discussed with JIMMY FULLER [phone] at 6/3/2021 7:36 PM by Dr. Roque of Radiology with read back confirmation.    < end of copied text >  	  LABS:	 	    CARDIAC MARKERS:                                  11.3   7.02  )-----------( 290      ( 04 Jun 2021 09:31 )             37.9     06-04    137  |  103  |  6<L>  ----------------------------<  121<H>  3.8   |  16<L>  |  0.54    Ca    8.8      04 Jun 2021 00:56  Phos  3.0     06-04  Mg     1.7     06-04    TPro  6.7  /  Alb  3.5  /  TBili  0.5  /  DBili  x   /  AST  30  /  ALT  41  /  AlkPhos  99  06-04    PT/INR - ( 04 Jun 2021 00:56 )   PT: 21.1 sec;   INR: 1.81 ratio         PTT - ( 04 Jun 2021 09:31 )  PTT:>200.0 sec  proBNP: Serum Pro-Brain Natriuretic Peptide: 115 pg/mL (06-04 @ 00:56)  Serum Pro-Brain Natriuretic Peptide: 152 pg/mL (06-03 @ 20:53)    Lipid Profile:   HgA1c:   TSH: Thyroid Stimulating Hormone, Serum: 2.59 uIU/mL (06-04 @ 04:50)

## 2021-06-04 NOTE — PROGRESS NOTE ADULT - ATTENDING COMMENTS
Patient is seen and examined with fellow, NP and the CCU house-staff. I agree with the history, physical and the assessment and plan.  saddle PE with RV enlargement on TTE  hemodynamically stable  on hep gtt  trend CE   no invasive intervention needed at this time  prior h/o ? APLS and also is s/p gastric sleeve - hus absorption of NOACs will be compromised - will start coumadin tonight Patient is seen and examined with fellow, NP and the CCU house-staff. I agree with the history, physical and the assessment and plan.  saddle PE with RV enlargement on TTE  hemodynamically stable  on hep gtt  trend CE   no invasive intervention needed at this time  prior h/o ? APLS and also is s/p gastric sleeve -  absorption of NOACs will be compromised - will start coumadin tonight

## 2021-06-04 NOTE — PROGRESS NOTE ADULT - ASSESSMENT
39 y/o female with a PMH of asthma (intubated 4 times, last in 1992), PE in October of 2018 (off all anticoagulation since 1/2020), GERD, morbid obesity s/p [facility] s/p upper endoscopy, laparoscopic gastrectomy revision, and duodenal switch (5/14/21, Dr. Kay, [facility]), now presenting with pulmonary embolism.    Recommendations:  - please give patient banana bag x 1 (1L NS with 10ml multivitamin over 4 hours plus IV push of 100mg Thiamine and 1mg folic acid)  - No objection to heparinization from surgical perspective  - Continue bariatric diet  - Dr. Kay notified by ED    Please call back [facility] with any additional questions or concerns    [facility]  [phone]

## 2021-06-04 NOTE — PROGRESS NOTE ADULT - SUBJECTIVE AND OBJECTIVE BOX
ARTURO JOSEPH  MRN-[id number]  Patient is a 40y old  Female who presents with a chief complaint of PE (04 Jun 2021 06:15)    HPI: 40F Hx asthma (multiple intubations, last 1992), idiopathic PE in 2018 (on Xarelto till 1/2020), morbid obesity s/p lap sleeve gastrectomy in 2012 and gastric sleeve revision with duodenal switch and hiatal hernia repair on 5/14/21 who presents with shortness of breath and found to have saddle PE. Patient with recent bariatric surgery and seen by hematology post-operatively and started on subcutaneous lovenox 40 mg every 12 hours due to DVT/PE risk. Patient noted left calf swelling and redness on 5/24 and evaluated at an outside hospital where was given diagnosis of thrombophlebitis. She completed her course of subcutaneous lovenox the next day (5/25). The swelling and redness worsened and she was recommended by hematologist to restart AC with Eliquis on 5/28 but was only able to take two doses due to insurance issues. Patient began having intermittent chest pain and palpitations since 2 days ago and also developed shortness of breath, similar but not as severe, as with prior PE so came to ED. In ED, patient had CTA showing saddle PE with extension throughout and lung and evidence of right heart strain. Patient completed series of Covid vaccination on 1/27/21. Patient has had prior hematologic work up with no known clotting disorders. (03 Jun 2021 20:27)    REVIEW OF SYSTEMS:  CONSTITUTIONAL: No weakness, fevers or chills  EYES/ENT: No visual changes;  No vertigo or throat pain   NECK: No pain or stiffness  RESPIRATORY: No cough, wheezing, hemoptysis; No shortness of breath  CARDIOVASCULAR: No chest pain or palpitations  GASTROINTESTINAL: No abdominal or epigastric pain. No nausea, vomiting, or hematemesis; No diarrhea or constipation. No melena or hematochezia.  GENITOURINARY: No dysuria, frequency or hematuria  NEUROLOGICAL: No numbness or weakness  SKIN: No itching, rashes    ICU Vital Signs Last 24 Hrs  T(C): 36.7 (04 Jun 2021 03:00), Max: 36.9 (03 Jun 2021 22:23)  T(F): 98.1 (04 Jun 2021 03:00), Max: 98.4 (03 Jun 2021 22:23)  HR: 62 (04 Jun 2021 06:00) (62 - 98)  BP: 96/59 (04 Jun 2021 06:00) (96/59 - 127/87)  BP(mean): 69 (04 Jun 2021 06:00) (69 - 90)  RR: 18 (04 Jun 2021 06:00) (15 - 20)  SpO2: 87% (04 Jun 2021 06:00) (87% - 98%)      I&O's Summary    03 Jun 2021 07:01  -  04 Jun 2021 07:00  --------------------------------------------------------  IN: 191 mL / OUT: 0 mL / NET: 191 mL    PHYSICAL EXAM:    ============================I/O===========================   I&O's Detail    03 Jun 2021 07:01  -  04 Jun 2021 07:00  --------------------------------------------------------  IN:    Heparin: 76 mL    Heparin Infusion: 115 mL  Total IN: 191 mL    OUT:  Total OUT: 0 mL    Total NET: 191 mL  ============================ LABS =========================                  11.2   7.82  )-----------( 291      ( 04 Jun 2021 00:56 )             36.6     06-04    137  |  103  |  6<L>  ----------------------------<  121<H>  3.8   |  16<L>  |  0.54    Ca    8.8      04 Jun 2021 00:56  Phos  3.0     06-04  Mg     1.7     06-04    TPro  6.7  /  Alb  3.5  /  TBili  0.5  /  DBili  x   /  AST  30  /  ALT  41  /  AlkPhos  99  06-04    Troponin T, High Sensitivity Result: 8 ng/L (06-04-21 @ 00:56)  Troponin T, High Sensitivity Result: 9 ng/L (06-03-21 @ 17:39)    LIVER FUNCTIONS - ( 04 Jun 2021 00:56 )  Alb: 3.5 g/dL / Pro: 6.7 g/dL / ALK PHOS: 99 U/L / ALT: 41 U/L / AST: 30 U/L / GGT: x           PT/INR - ( 04 Jun 2021 00:56 )   PT: 21.1 sec;   INR: 1.81 ratio    PTT - ( 04 Jun 2021 00:56 )  PTT:>200.0 sec    Blood Gas Venous - Lactate: 0.8 mmoL/L (06-04-21 @ 00:52)  Blood Gas Venous - Lactate: 1.0 mmoL/L (06-03-21 @ 20:53)  ======================Micro/Rad/Cardio=================  Telemetry: Reviewed   EKG: Reviewed  CXR: Reviewed  Culture: Reviewed   Echo:   Cath:   ======================================================  PAST MEDICAL & SURGICAL HISTORY:  Asthma  intubated 4 times. Last time at age 12 (1992)  Vertigo, benign positional  Knee dislocation, left, sequela  Pulmonary embolism  October 2018 had 5 PEs, work up negative, off anticoagulation as of 1/2020 but placed on lovenox post-op after abdominal surgery 5/14/21  GERD (gastroesophageal reflux disease)  Anxiety  Insomnia  difficulty falling asleep since bariatric surgery May 2021  Left carpal tunnel syndrome  COVID-19 vaccine series completed  moderna, completed 1/27/21  Cervical herniated disc  Varicose veins  Morbid obesity with BMI of 50.0-59.9, adult  Thrombocytosis  Status post laparoscopic sleeve gastrectomy  H/O hiatal hernia  2012 during gastric sleeve surgery  H/O rectal polypectomy  2012 benign  History of weight loss surgery  sleeve gastrectomy revision, YANCI procedure and hiatal hernia repair 5/14/21 ARTURO JOSEPH  MRN-[id number]  Patient is a 40y old  Female who presents with a chief complaint of PE (04 Jun 2021 06:15)    HPI: 40F Hx asthma (multiple intubations, last 1992), idiopathic PE in 2018 (on Xarelto till 1/2020 - unclear APLS Dx, morbid obesity s/p lap sleeve gastrectomy in 2012 and gastric sleeve revision with duodenal switch and hiatal hernia repair on 5/14/21 who presents with shortness of breath and found to have saddle PE. Patient with recent bariatric surgery and seen by hematology post-operatively and started on subcutaneous lovenox 40 mg every 12 hours due to DVT/PE risk. Patient noted left calf swelling and redness on 5/24 and evaluated at an outside hospital where was given diagnosis of thrombophlebitis. She completed her course of subcutaneous lovenox the next day (5/25). The swelling and redness worsened and she was recommended by hematologist to restart AC with Eliquis on 5/28 but was only able to take two doses due to insurance issues. Patient began having intermittent chest pain and palpitations since 2 days ago and also developed shortness of breath, similar but not as severe, as with prior PE so came to ED. In ED, patient had CTA showing saddle PE with extension throughout and lung and evidence of right heart strain. Patient completed series of Covid vaccination on 1/27/21. Patient has had prior hematologic work up with no known clotting disorders. (03 Jun 2021 20:27)    REVIEW OF SYSTEMS:  CONSTITUTIONAL: No weakness, fevers or chills  EYES/ENT: No visual changes;  No vertigo or throat pain   NECK: No pain or stiffness  RESPIRATORY: No cough, wheezing, hemoptysis; No shortness of breath  CARDIOVASCULAR: No chest pain or palpitations  GASTROINTESTINAL: No abdominal or epigastric pain. No nausea, vomiting, or hematemesis; No diarrhea or constipation. No melena or hematochezia.  GENITOURINARY: No dysuria, frequency or hematuria  NEUROLOGICAL: No numbness or weakness  SKIN: No itching, rashes    ICU Vital Signs Last 24 Hrs  T(C): 36.7 (04 Jun 2021 03:00), Max: 36.9 (03 Jun 2021 22:23)  T(F): 98.1 (04 Jun 2021 03:00), Max: 98.4 (03 Jun 2021 22:23)  HR: 62 (04 Jun 2021 06:00) (62 - 98)  BP: 96/59 (04 Jun 2021 06:00) (96/59 - 127/87)  BP(mean): 69 (04 Jun 2021 06:00) (69 - 90)  RR: 18 (04 Jun 2021 06:00) (15 - 20)  SpO2: 87% (04 Jun 2021 06:00) (87% - 98%)      I&O's Summary    03 Jun 2021 07:01  -  04 Jun 2021 07:00  --------------------------------------------------------  IN: 191 mL / OUT: 0 mL / NET: 191 mL    PHYSICAL EXAM:  General: WN/WD NAD  Neurology: A&Ox3, nonfocal, DE LA O x 4  Respiratory: CTA B/L, no wheezing on RA  CV: RRR, S1S2, no murmurs, rubs or gallops  Abdominal: Soft, NT, ND normoactive BS  Extremities: Trace edema, LLE +phlebitis   ============================I/O===========================   I&O's Detail    03 Jun 2021 07:01  -  04 Jun 2021 07:00  --------------------------------------------------------  IN:    Heparin: 76 mL    Heparin Infusion: 115 mL  Total IN: 191 mL    OUT:  Total OUT: 0 mL    Total NET: 191 mL  ============================ LABS =========================                  11.2   7.82  )-----------( 291      ( 04 Jun 2021 00:56 )             36.6     06-04    137  |  103  |  6<L>  ----------------------------<  121<H>  3.8   |  16<L>  |  0.54    Ca    8.8      04 Jun 2021 00:56  Phos  3.0     06-04  Mg     1.7     06-04    TPro  6.7  /  Alb  3.5  /  TBili  0.5  /  DBili  x   /  AST  30  /  ALT  41  /  AlkPhos  99  06-04    Troponin T, High Sensitivity Result: 8 ng/L (06-04-21 @ 00:56)  Troponin T, High Sensitivity Result: 9 ng/L (06-03-21 @ 17:39)    LIVER FUNCTIONS - ( 04 Jun 2021 00:56 )  Alb: 3.5 g/dL / Pro: 6.7 g/dL / ALK PHOS: 99 U/L / ALT: 41 U/L / AST: 30 U/L / GGT: x           PT/INR - ( 04 Jun 2021 00:56 )   PT: 21.1 sec;   INR: 1.81 ratio    PTT - ( 04 Jun 2021 00:56 )  PTT:>200.0 sec    Blood Gas Venous - Lactate: 0.8 mmoL/L (06-04-21 @ 00:52)  Blood Gas Venous - Lactate: 1.0 mmoL/L (06-03-21 @ 20:53)  ======================Micro/Rad/Cardio=================  Telemetry: Reviewed   EKG: Reviewed  CXR: Reviewed  Culture: Reviewed   Echo:   Cath:   ======================================================  PAST MEDICAL & SURGICAL HISTORY:  Asthma  intubated 4 times. Last time at age 12 (1992)  Vertigo, benign positional  Knee dislocation, left, sequela  Pulmonary embolism  October 2018 had 5 PEs, work up negative, off anticoagulation as of 1/2020 but placed on lovenox post-op after abdominal surgery 5/14/21  GERD (gastroesophageal reflux disease)  Anxiety  Insomnia  difficulty falling asleep since bariatric surgery May 2021  Left carpal tunnel syndrome  COVID-19 vaccine series completed  moderna, completed 1/27/21  Cervical herniated disc  Varicose veins  Morbid obesity with BMI of 50.0-59.9, adult  Thrombocytosis  Status post laparoscopic sleeve gastrectomy  H/O hiatal hernia  2012 during gastric sleeve surgery  H/O rectal polypectomy  2012 benign  History of weight loss surgery  sleeve gastrectomy revision, YANCI procedure and hiatal hernia repair 5/14/21

## 2021-06-04 NOTE — CONSULT NOTE ADULT - SUBJECTIVE AND OBJECTIVE BOX
HPI:  Patient is a 39 y/o F w/ a PMHx of asthma (multiple intubations, last 1992), idiopathic PE in 2018 (previously on Xarelto till 1/2020), and morbid obesity s/p lap sleeve gastrectomy in 2012 and gastric sleeve revision with duodenal switch and hiatal hernia repair on 5/14/21 who presented with shortness of breath, found to have saddle PE, and was admitted to the CCU for further management. Patient had a recent bariatric surgery and was seen by hematology post-operatively. She was started on subcutaneous Lovenox 40 mg every 12 hours due to DVT/PE risk. Patient noted left calf swelling and redness on 5/24. She was evaluated at an outside hospital where she was given a diagnosis of thrombophlebitis. She completed her course of subcutaneous lovenox the next day (5/25). The swelling and redness worsened and she was recommended by hematologist to restart AC with Eliquis on 5/28 but was only able to take two doses due to insurance issues. Patient began having intermittent chest pain and palpitations since 2 days ago and also developed shortness of breath, similar but not as severe, as with prior PE so came to ED. In ED, patient had CTA showing saddle PE with extension throughout and lung and evidence of right heart strain.    Patient completed series of Covid vaccination on 1/27/21. Patient has had prior hematologic work up with no known clotting disorders. (03 Jun 2021 20:27)      PAST MEDICAL & SURGICAL HISTORY:  Asthma  intubated 4 times. Last time at age 12 (1992)    Vertigo, benign positional    Knee dislocation, left, sequela  2010    Pulmonary embolism  October 2018 had 5 PEs, work up negative, off anticoagulation as of 1/2020 but placed on lovenox post-op after abdominal surgery 5/14/21    GERD (gastroesophageal reflux disease)    Anxiety    Insomnia  difficulty falling asleep since bariatric surgery May 2021    Left carpal tunnel syndrome    COVID-19 vaccine series completed  moderna, completed 1/27/21    Cervical herniated disc  C 5 and 6    Varicose veins    Morbid obesity with BMI of 50.0-59.9, adult    Thrombocytosis    Status post laparoscopic sleeve gastrectomy  2012    H/O hiatal hernia  2012 during gastric sleeve surgery    H/O rectal polypectomy  2012 benign    History of weight loss surgery  sleeve gastrectomy revision, YANCI procedure and hiatal hernia repair 5/14/21        Review of Systems:   CONSTITUTIONAL: No fever or chills  EYES: No eye pain or discharge  ENMT:  No sinus or throat pain  NECK: No pain or stiffness  RESPIRATORY: No shortness of breath or cough  CARDIOVASCULAR: No chest pain or palpitations  GASTROINTESTINAL: No vomiting or abdominal pain  GENITOURINARY: No dysuria or hematuria  NEUROLOGICAL: No headaches or confusion  SKIN: No itching or rash  MUSCULOSKELETAL: No joint pain or back pain    Allergies    sulfa drugs (Hives; Anaphylaxis)    Intolerances    pseudoephedrine (Other)  red dye (Vomiting)  Reglan (Other)      Social History: Occasional EtOH use, 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, Works as a paramedic    FAMILY HISTORY:  Family history of colon cancer (Grandparent)  and uncle    Family history of multiple myeloma (Grandparent)    FHx: coronary artery disease (Father, Mother)    Family history of hypertension (Father, Mother)    Family history of type 2 diabetes mellitus (Father, Mother)    Family history of psoriatic arthritis (Mother)    Family history of Sjogren&#x27;s disease (Mother)    FHx: rheumatoid arthritis (Mother)        MEDICATIONS  (STANDING):  calcium carbonate    500 mG (Tums) Chewable 1 Tablet(s) Chew daily  cholecalciferol 1000 Unit(s) Oral daily  heparin  Infusion 1600 Unit(s)/Hr (16 mL/Hr) IV Continuous <Continuous>  mometasone 220 MICROgram(s) Inhaler 1 Puff(s) Inhalation daily  multivitamin/minerals Oral Solution (WELLESSE) 30 milliLiter(s) Oral daily  pantoprazole  Injectable 40 milliGRAM(s) IV Push two times a day  warfarin 7.5 milliGRAM(s) Oral once    MEDICATIONS  (PRN):  oxyCODONE    Solution 5 milliGRAM(s) Oral every 6 hours PRN Severe Pain (7 - 10)      CAPILLARY BLOOD GLUCOSE        I&O's Summary    03 Jun 2021 07:01  -  04 Jun 2021 07:00  --------------------------------------------------------  IN: 210 mL / OUT: 0 mL / NET: 210 mL    04 Jun 2021 07:01  -  04 Jun 2021 15:50  --------------------------------------------------------  IN: 238 mL / OUT: 0 mL / NET: 238 mL    Vital Signs Last 24 Hrs  T(C): 36.5 (04 Jun 2021 09:00), Max: 36.9 (03 Jun 2021 22:23)  T(F): 97.7 (04 Jun 2021 09:00), Max: 98.4 (03 Jun 2021 22:23)  HR: 70 (04 Jun 2021 15:00) (58 - 98)  BP: 103/76 (04 Jun 2021 15:00) (96/59 - 127/87)  BP(mean): 88 (04 Jun 2021 15:00) (63 - 105)  RR: 20 (04 Jun 2021 15:00) (15 - 45)  SpO2: 94% (04 Jun 2021 15:00) (87% - 98%)    PHYSICAL EXAM:  GENERAL: NAD, Sitting in a chair  HEENT: NC/AT, Sclera anicteric  NECK: Supple  CHEST/LUNG: Respirations grossly nonlabored  HEART: RRR; +S1/S2  ABDOMEN: +BS, Soft, NT  EXTREMITIES: No LE edema  NEUROLOGY: Awake and alert, Answering questions and following commands appropriately  SKIN: Warm and dry  PSYCH: Calm and cooperative    LABS:                        11.3   7.02  )-----------( 290      ( 04 Jun 2021 09:31 )             37.9     06-04    137  |  103  |  6<L>  ----------------------------<  121<H>  3.8   |  16<L>  |  0.54    Ca    8.8      04 Jun 2021 00:56  Phos  3.0     06-04  Mg     1.7     06-04    TPro  6.7  /  Alb  3.5  /  TBili  0.5  /  DBili  x   /  AST  30  /  ALT  41  /  AlkPhos  99  06-04    PT/INR - ( 04 Jun 2021 00:56 )   PT: 21.1 sec;   INR: 1.81 ratio         PTT - ( 04 Jun 2021 09:31 )  PTT:>200.0 sec    RADIOLOGY & ADDITIONAL TESTS:  Studies reviewed.   HPI:  Patient is a 39 y/o F w/ a PMHx of asthma (multiple intubations, last 1992), idiopathic PE in 2018 (previously on Xarelto till 1/2020), and morbid obesity s/p lap sleeve gastrectomy in 2012 and gastric sleeve revision with duodenal switch and hiatal hernia repair on 5/14/21 who presented with shortness of breath, found to have saddle PE, and was admitted to the CCU for further management. Patient had a recent bariatric surgery and was seen by hematology post-operatively. She was started on subcutaneous Lovenox 40 mg every 12 hours due to DVT/PE risk. Patient noted left calf swelling and redness on 5/24. She was evaluated at an outside hospital where she was given a diagnosis of thrombophlebitis. She completed her course of subcutaneous lovenox the next day (5/25). The swelling and redness worsened and she was recommended by hematologist to restart AC with Eliquis on 5/28 but was only able to take two doses due to insurance issues. Patient began having intermittent chest pain and palpitations since 2 days PTA and also developed shortness of breath, similar but not as severe, as with prior PE so came to ED. In the ED, patient had a CTA showing saddle PE with extension throughout and lung and evidence of right heart strain. Patient completed series of Covid vaccination on 1/27/21. Patient has had prior hematologic work up with no known clotting disorders. Patient was started on a Heparin gtt and was admitted to the CCU for further management. Given patient's newly diagnosed recurrent PE, Hematology was consulted for further evaluation.    Patient seen this afternoon. She was sitting comfortably in a chair at time of visit. Above history was confirmed. She reports that her breathing is improved currently. She continues to endorse some L calf pain. No complaints of chest pain or abdominal pain. Patient was afebrile overnight.    Hematologic History:  Patient has a history of an extensive bilateral unprovoked PE in October 30th 2019. Patient had a hypercoagulable workup at the time including anticardiolipin antibodies (negative), normal Protein C and S, and Factor V Leiden + Prothrombin gene mutation testing were normal. Overall, there was no evidence of a hypercoagulable state. Patient was teated with a year of Eliquis. She had not had a recurrent VTE event until the saddle PE discovered during this hospitalization. Patient follows with Dr. Yan Monahan (Hematology) as an outpatient.    PAST MEDICAL & SURGICAL HISTORY:  Asthma  intubated 4 times. Last time at age 12 (1992)    Vertigo, benign positional    Knee dislocation, left, sequela  2010    Pulmonary embolism  October 2018 had 5 PEs, work up negative, off anticoagulation as of 1/2020 but placed on lovenox post-op after abdominal surgery 5/14/21    GERD (gastroesophageal reflux disease)    Anxiety    Insomnia  difficulty falling asleep since bariatric surgery May 2021    Left carpal tunnel syndrome    COVID-19 vaccine series completed  moderna, completed 1/27/21    Cervical herniated disc  C 5 and 6    Varicose veins    Morbid obesity with BMI of 50.0-59.9, adult    Thrombocytosis    Status post laparoscopic sleeve gastrectomy  2012    H/O hiatal hernia  2012 during gastric sleeve surgery    H/O rectal polypectomy  2012 benign    History of weight loss surgery  sleeve gastrectomy revision, YANCI procedure and hiatal hernia repair 5/14/21    Review of Systems:   CONSTITUTIONAL: No fever or chills  EYES: No eye pain or discharge  ENMT: No sinus or throat pain  NECK: No pain or stiffness  RESPIRATORY: + Shortness of breath (improved), No wheezing  CARDIOVASCULAR: + Chest pain (improved), + Palpitations (improved)  GASTROINTESTINAL: No vomiting or abdominal pain  GENITOURINARY: No dysuria or hematuria  NEUROLOGICAL: No headaches or confusion  SKIN: No itching or rash  MUSCULOSKELETAL: + L calf pain, No back pain    Allergies    sulfa drugs (Hives; Anaphylaxis)    Intolerances    pseudoephedrine (Other)  red dye (Vomiting)  Reglan (Other)      Social History: Occasional EtOH use, 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, Works as a paramedic    FAMILY HISTORY:  Family history of colon cancer (Grandparent)  and uncle    Family history of multiple myeloma (Grandparent)    FHx: coronary artery disease (Father, Mother)    Family history of hypertension (Father, Mother)    Family history of type 2 diabetes mellitus (Father, Mother)    Family history of psoriatic arthritis (Mother)    Family history of Sjogren&#x27;s disease (Mother)    FHx: rheumatoid arthritis (Mother)        MEDICATIONS  (STANDING):  calcium carbonate    500 mG (Tums) Chewable 1 Tablet(s) Chew daily  cholecalciferol 1000 Unit(s) Oral daily  heparin  Infusion 1600 Unit(s)/Hr (16 mL/Hr) IV Continuous <Continuous>  mometasone 220 MICROgram(s) Inhaler 1 Puff(s) Inhalation daily  multivitamin/minerals Oral Solution (WELLESSE) 30 milliLiter(s) Oral daily  pantoprazole  Injectable 40 milliGRAM(s) IV Push two times a day  warfarin 7.5 milliGRAM(s) Oral once    MEDICATIONS  (PRN):  oxyCODONE    Solution 5 milliGRAM(s) Oral every 6 hours PRN Severe Pain (7 - 10)      CAPILLARY BLOOD GLUCOSE        I&O's Summary    03 Jun 2021 07:01  -  04 Jun 2021 07:00  --------------------------------------------------------  IN: 210 mL / OUT: 0 mL / NET: 210 mL    04 Jun 2021 07:01  -  04 Jun 2021 15:50  --------------------------------------------------------  IN: 238 mL / OUT: 0 mL / NET: 238 mL    Vital Signs Last 24 Hrs  T(C): 36.5 (04 Jun 2021 09:00), Max: 36.9 (03 Jun 2021 22:23)  T(F): 97.7 (04 Jun 2021 09:00), Max: 98.4 (03 Jun 2021 22:23)  HR: 70 (04 Jun 2021 15:00) (58 - 98)  BP: 103/76 (04 Jun 2021 15:00) (96/59 - 127/87)  BP(mean): 88 (04 Jun 2021 15:00) (63 - 105)  RR: 20 (04 Jun 2021 15:00) (15 - 45)  SpO2: 94% (04 Jun 2021 15:00) (87% - 98%)    PHYSICAL EXAM:  GENERAL: NAD, Sitting in a chair  HEENT: NC/AT, Sclera anicteric  NECK: Supple  CHEST/LUNG: Respirations grossly nonlabored  HEART: RRR; +S1/S2  ABDOMEN: +BS, Soft, NT  EXTREMITIES: + LLE phlebitis skin changes  NEUROLOGY: Awake and alert, Answering questions and following commands appropriately  SKIN: Warm and dry  PSYCH: Calm and cooperative    LABS:                        11.3   7.02  )-----------( 290      ( 04 Jun 2021 09:31 )             37.9     06-04    137  |  103  |  6<L>  ----------------------------<  121<H>  3.8   |  16<L>  |  0.54    Ca    8.8      04 Jun 2021 00:56  Phos  3.0     06-04  Mg     1.7     06-04    TPro  6.7  /  Alb  3.5  /  TBili  0.5  /  DBili  x   /  AST  30  /  ALT  41  /  AlkPhos  99  06-04    PT/INR - ( 04 Jun 2021 00:56 )   PT: 21.1 sec;   INR: 1.81 ratio         PTT - ( 04 Jun 2021 09:31 )  PTT:>200.0 sec    RADIOLOGY & ADDITIONAL TESTS:  Studies reviewed.

## 2021-06-04 NOTE — CHART NOTE - NSCHARTNOTEFT_GEN_A_CORE
CICU Transfer Note    Transfer from: CICU    Transfer to: (  ) Medicine    ( X ) Telemetry     (   ) RCU        (    ) Palliative         (   ) Stroke Unit       (  ) MICU     Accepting Physician: PABLO Wilder  Signout given to: DEBRA Wilder, Medicine ACP    ARTURO JOSEPH  MRN-[id number]  Patient is a 40y old  Female who presents with a chief complaint of Saddle PE (04 Jun 2021 07:32)    HPI: 40F Hx asthma (multiple intubations, last 1992), idiopathic PE in 2018 (on Xarelto till 1/2020 - unclear APLS Dx, morbid obesity s/p lap sleeve gastrectomy in 2012 and gastric sleeve revision with duodenal switch and hiatal hernia repair on 5/14/21 who presents with shortness of breath and found to have saddle PE. Patient with recent bariatric surgery and seen by hematology post-operatively and started on subcutaneous lovenox 40 mg every 12 hours due to DVT/PE risk. Patient noted left calf swelling and redness on 5/24 and evaluated at an outside hospital where was given diagnosis of thrombophlebitis. She completed her course of subcutaneous lovenox the next day (5/25). The swelling and redness worsened and she was recommended by hematologist to restart AC with Eliquis on 5/28 but was only able to take two doses due to insurance issues. Patient began having intermittent chest pain and palpitations since 2 days ago and also developed shortness of breath, similar but not as severe, as with prior PE so came to ED. In ED, patient had CTA showing saddle PE with extension throughout and lung and evidence of right heart strain. Patient completed series of Covid vaccination on 1/27/21. Patient has had prior hematologic work up with no known clotting disorders. (03 Jun 2021 20:27)    REVIEW OF SYSTEMS:  CONSTITUTIONAL: No weakness, fevers or chills  EYES/ENT: No visual changes;  No vertigo or throat pain   NECK: No pain or stiffness  RESPIRATORY: No cough, wheezing, hemoptysis; No shortness of breath  CARDIOVASCULAR: No chest pain or palpitations  GASTROINTESTINAL: No abdominal or epigastric pain. No nausea, vomiting, or hematemesis; No diarrhea or constipation. No melena or hematochezia.  GENITOURINARY: No dysuria, frequency or hematuria  NEUROLOGICAL: No numbness or weakness  SKIN: No itching, rashes    ICU Vital Signs Last 24 Hrs  T(C): 36.7 (04 Jun 2021 03:00), Max: 36.9 (03 Jun 2021 22:23)  T(F): 98.1 (04 Jun 2021 03:00), Max: 98.4 (03 Jun 2021 22:23)  HR: 62 (04 Jun 2021 06:00) (62 - 98)  BP: 96/59 (04 Jun 2021 06:00) (96/59 - 127/87)  BP(mean): 69 (04 Jun 2021 06:00) (69 - 90)  RR: 18 (04 Jun 2021 06:00) (15 - 20)  SpO2: 87% (04 Jun 2021 06:00) (87% - 98%)      I&O's Summary    03 Jun 2021 07:01  -  04 Jun 2021 07:00  --------------------------------------------------------  IN: 191 mL / OUT: 0 mL / NET: 191 mL    PHYSICAL EXAM:  General: WN/WD NAD  Neurology: A&Ox3, nonfocal, DE LA O x 4  Respiratory: CTA B/L, no wheezing on RA  CV: RRR, S1S2, no murmurs, rubs or gallops  Abdominal: Soft, NT, ND normoactive BS  Extremities: Trace edema, LLE +phlebitis   ============================I/O===========================   I&O's Detail    03 Jun 2021 07:01  -  04 Jun 2021 07:00  --------------------------------------------------------  IN:    Heparin: 76 mL    Heparin Infusion: 115 mL  Total IN: 191 mL    OUT:  Total OUT: 0 mL    Total NET: 191 mL  ============================ LABS =========================                  11.2   7.82  )-----------( 291      ( 04 Jun 2021 00:56 )             36.6     06-04    137  |  103  |  6<L>  ----------------------------<  121<H>  3.8   |  16<L>  |  0.54    Ca    8.8      04 Jun 2021 00:56  Phos  3.0     06-04  Mg     1.7     06-04    TPro  6.7  /  Alb  3.5  /  TBili  0.5  /  DBili  x   /  AST  30  /  ALT  41  /  AlkPhos  99  06-04    Troponin T, High Sensitivity Result: 8 ng/L (06-04-21 @ 00:56)  Troponin T, High Sensitivity Result: 9 ng/L (06-03-21 @ 17:39)    LIVER FUNCTIONS - ( 04 Jun 2021 00:56 )  Alb: 3.5 g/dL / Pro: 6.7 g/dL / ALK PHOS: 99 U/L / ALT: 41 U/L / AST: 30 U/L / GGT: x           PT/INR - ( 04 Jun 2021 00:56 )   PT: 21.1 sec;   INR: 1.81 ratio    PTT - ( 04 Jun 2021 00:56 )  PTT:>200.0 sec    Blood Gas Venous - Lactate: 0.8 mmoL/L (06-04-21 @ 00:52)  Blood Gas Venous - Lactate: 1.0 mmoL/L (06-03-21 @ 20:53)  ======================Micro/Rad/Cardio=================  Telemetry: Reviewed   EKG: Reviewed  CXR: Reviewed  Culture: Reviewed   Echo:   Cath:   ======================================================  PAST MEDICAL & SURGICAL HISTORY:  Asthma  intubated 4 times. Last time at age 12 (1992)  Vertigo, benign positional  Knee dislocation, left, sequela  Pulmonary embolism  October 2018 had 5 PEs, work up negative, off anticoagulation as of 1/2020 but placed on lovenox post-op after abdominal surgery 5/14/21  GERD (gastroesophageal reflux disease)  Anxiety  Insomnia  difficulty falling asleep since bariatric surgery May 2021  Left carpal tunnel syndrome  COVID-19 vaccine series completed  moderna, completed 1/27/21  Cervical herniated disc  Varicose veins  Morbid obesity with BMI of 50.0-59.9, adult  Thrombocytosis  Status post laparoscopic sleeve gastrectomy  H/O hiatal hernia  2012 during gastric sleeve surgery  H/O rectal polypectomy  2012 benign  History of weight loss surgery  sleeve gastrectomy revision, YANCI procedure and hiatal hernia repair 5/14/21    Assessment and Plan:   · Assessment	  A/P: 40F Hx asthma w/ multiple prior intubations, idiopathic PE in 2018 (on Xarelto till 1/2020 - unclear prior APLS diagnosis), morbid obesity s/p lap sleeve gastrectomy in 2012 w/ recent revision on 5/14 who p/w saddle PE w/ RV strain although HD stable on RA, currently on systemic Heparin with bridge to Coumadin     #Saddle PE w/ RV strain   - Remains HD stable on RA in NAD  - Systemic Heparin, bridge to Coumadin   - Heme consulted ([facility]) f/u recs   - Dr. Monahan (outpt Heme) called/aware of admission   - f/u repeat TTE today to re-eval RVF  - Vascular cards to follow     #Phlebitis LLE  - Warm compresses, pain management     #Infertility pending outpatient IVF  - Hold off IVF x 3 months given RV strain   - Cards OB outpatient f/u (Dr. Campos) - close monitoring/repeat echo      [phone]    Transfer from: CICU    Transfer to: (  ) Medicine    ( X ) Telemetry     (   ) RCU        (    ) Palliative         (   ) Stroke Unit       (  ) MICU     Accepting Physician: PABLO Wilder  Signout given to: DEBRA Wilder, Medicine ACP    ARTURO JOSEPH  MRN-[id number]  Patient is a 40y old  Female who presents with a chief complaint of Saddle PE (04 Jun 2021 07:32)    HPI: 40F Hx asthma (multiple intubations, last 1992), idiopathic PE in 2018 (on Xarelto till 1/2020 - unclear APLS Dx, morbid obesity s/p lap sleeve gastrectomy in 2012 and gastric sleeve revision with duodenal switch and hiatal hernia repair on 5/14/21 who presents with shortness of breath and found to have saddle PE. Patient with recent bariatric surgery and seen by hematology post-operatively and started on subcutaneous lovenox 40 mg every 12 hours due to DVT/PE risk. Patient noted left calf swelling and redness on 5/24 and evaluated at an outside hospital where was given diagnosis of thrombophlebitis. She completed her course of subcutaneous lovenox the next day (5/25). The swelling and redness worsened and she was recommended by hematologist to restart AC with Eliquis on 5/28 but was only able to take two doses due to insurance issues. Patient began having intermittent chest pain and palpitations since 2 days ago and also developed shortness of breath, similar but not as severe, as with prior PE so came to ED. In ED, patient had CTA showing saddle PE with extension throughout and lung and evidence of right heart strain. Patient completed series of Covid vaccination on 1/27/21. Patient has had prior hematologic work up with no known clotting disorders. (03 Jun 2021 20:27)    REVIEW OF SYSTEMS:  CONSTITUTIONAL: No weakness, fevers or chills  EYES/ENT: No visual changes;  No vertigo or throat pain   NECK: No pain or stiffness  RESPIRATORY: No cough, wheezing, hemoptysis; No shortness of breath  CARDIOVASCULAR: No chest pain or palpitations  GASTROINTESTINAL: No abdominal or epigastric pain. No nausea, vomiting, or hematemesis; No diarrhea or constipation. No melena or hematochezia.  GENITOURINARY: No dysuria, frequency or hematuria  NEUROLOGICAL: No numbness or weakness  SKIN: No itching, rashes    ICU Vital Signs Last 24 Hrs  T(C): 36.7 (04 Jun 2021 03:00), Max: 36.9 (03 Jun 2021 22:23)  T(F): 98.1 (04 Jun 2021 03:00), Max: 98.4 (03 Jun 2021 22:23)  HR: 62 (04 Jun 2021 06:00) (62 - 98)  BP: 96/59 (04 Jun 2021 06:00) (96/59 - 127/87)  BP(mean): 69 (04 Jun 2021 06:00) (69 - 90)  RR: 18 (04 Jun 2021 06:00) (15 - 20)  SpO2: 87% (04 Jun 2021 06:00) (87% - 98%)      I&O's Summary    03 Jun 2021 07:01  -  04 Jun 2021 07:00  --------------------------------------------------------  IN: 191 mL / OUT: 0 mL / NET: 191 mL    PHYSICAL EXAM:  General: WN/WD NAD  Neurology: A&Ox3, nonfocal, DE LA O x 4  Respiratory: CTA B/L, no wheezing on RA  CV: RRR, S1S2, no murmurs, rubs or gallops  Abdominal: Soft, NT, ND normoactive BS  Extremities: Trace edema, LLE +phlebitis   ============================I/O===========================   I&O's Detail    03 Jun 2021 07:01  -  04 Jun 2021 07:00  --------------------------------------------------------  IN:    Heparin: 76 mL    Heparin Infusion: 115 mL  Total IN: 191 mL    OUT:  Total OUT: 0 mL    Total NET: 191 mL  ============================ LABS =========================                  11.2   7.82  )-----------( 291      ( 04 Jun 2021 00:56 )             36.6     06-04    137  |  103  |  6<L>  ----------------------------<  121<H>  3.8   |  16<L>  |  0.54    Ca    8.8      04 Jun 2021 00:56  Phos  3.0     06-04  Mg     1.7     06-04    TPro  6.7  /  Alb  3.5  /  TBili  0.5  /  DBili  x   /  AST  30  /  ALT  41  /  AlkPhos  99  06-04    Troponin T, High Sensitivity Result: 8 ng/L (06-04-21 @ 00:56)  Troponin T, High Sensitivity Result: 9 ng/L (06-03-21 @ 17:39)    LIVER FUNCTIONS - ( 04 Jun 2021 00:56 )  Alb: 3.5 g/dL / Pro: 6.7 g/dL / ALK PHOS: 99 U/L / ALT: 41 U/L / AST: 30 U/L / GGT: x           PT/INR - ( 04 Jun 2021 00:56 )   PT: 21.1 sec;   INR: 1.81 ratio    PTT - ( 04 Jun 2021 00:56 )  PTT:>200.0 sec    Blood Gas Venous - Lactate: 0.8 mmoL/L (06-04-21 @ 00:52)  Blood Gas Venous - Lactate: 1.0 mmoL/L (06-03-21 @ 20:53)  ======================Micro/Rad/Cardio=================  Telemetry: Reviewed   EKG: Reviewed  CXR: Reviewed  Culture: Reviewed   Echo:   Cath:   ======================================================  PAST MEDICAL & SURGICAL HISTORY:  Asthma  intubated 4 times. Last time at age 12 (1992)  Vertigo, benign positional  Knee dislocation, left, sequela  Pulmonary embolism  October 2018 had 5 PEs, work up negative, off anticoagulation as of 1/2020 but placed on lovenox post-op after abdominal surgery 5/14/21  GERD (gastroesophageal reflux disease)  Anxiety  Insomnia  difficulty falling asleep since bariatric surgery May 2021  Left carpal tunnel syndrome  COVID-19 vaccine series completed  moderna, completed 1/27/21  Cervical herniated disc  Varicose veins  Morbid obesity with BMI of 50.0-59.9, adult  Thrombocytosis  Status post laparoscopic sleeve gastrectomy  H/O hiatal hernia  2012 during gastric sleeve surgery  H/O rectal polypectomy  2012 benign  History of weight loss surgery  sleeve gastrectomy revision, YANCI procedure and hiatal hernia repair 5/14/21    Assessment and Plan:   · Assessment	  A/P: 40F Hx asthma w/ multiple prior intubations, idiopathic PE in 2018 (on Xarelto till 1/2020 - unclear prior APLS diagnosis), morbid obesity s/p lap sleeve gastrectomy in 2012 w/ recent revision on 5/14 who p/w saddle PE w/ RV strain although HD stable on RA, currently on systemic Heparin with bridge to Coumadin     #Saddle PE w/ RV strain   - Remains HD stable on RA in NAD  - Systemic Heparin, bridge to Coumadin although patient adamantly refusing Coumadin at this time -- f/up heme recs  - Heme consulted ([place]) f/u recs   - Dr. Monahan (outpt [facility]) called/aware of admission   - f/u repeat TTE today to re-eval RVF  - Vascular cards to follow     #Phlebitis LLE  - Warm compresses, pain management     #Infertility pending outpatient IVF  - Hold off IVF x 3 months given RV strain   - Cards OB outpatient f/u (Dr. Campos) - close monitoring/repeat echo    FOR FOLLOW UP:  [] hematology outpatient AC  []  on 1600U, decreased to 1400U at 930pm next PTT due at 3am    Rylie Messer [facility]  CICU [phone]

## 2021-06-04 NOTE — PROGRESS NOTE ADULT - SUBJECTIVE AND OBJECTIVE BOX
GENERAL SURGERY PROGRESS NOTE    SUBJECTIVE  Patient seen and examined        OBJECTIVE    PHYSICAL EXAM  General: Appears well, NAD  CHEST: breathing comfortably  CV: appears well perfused  Abdomen: soft, nontender, nondistended, no rebound or guarding  Extremities: Grossly symmetric    T(C): 36.7 (06-04-21 @ 03:00), Max: 36.9 (06-03-21 @ 22:23)  HR: 72 (06-04-21 @ 05:00) (66 - 98)  BP: 101/58 (06-04-21 @ 05:00) (99/72 - 127/87)  RR: 19 (06-04-21 @ 05:00) (15 - 20)  SpO2: 94% (06-04-21 @ 05:00) (92% - 98%)    06-03-21 @ 07:01  -  06-04-21 @ 06:15  --------------------------------------------------------  IN: 172 mL / OUT: 0 mL / NET: 172 mL        MEDICATIONS  acetaminophen  IVPB .. 1000 milliGRAM(s) IV Intermittent once  chlorhexidine 2% Cloths 1 Application(s) Topical <User Schedule>  heparin  Infusion 1900 Unit(s)/Hr IV Continuous <Continuous>  mometasone 220 MICROgram(s) Inhaler 1 Puff(s) Inhalation daily  pantoprazole  Injectable 40 milliGRAM(s) IV Push two times a day      LABS                        11.2   7.82  )-----------( 291      ( 04 Jun 2021 00:56 )             36.6     06-04    137  |  103  |  6<L>  ----------------------------<  121<H>  3.8   |  16<L>  |  0.54    Ca    8.8      04 Jun 2021 00:56  Phos  3.0     06-04  Mg     1.7     06-04    TPro  6.7  /  Alb  3.5  /  TBili  0.5  /  DBili  x   /  AST  30  /  ALT  41  /  AlkPhos  99  06-04    PT/INR - ( 04 Jun 2021 00:56 )   PT: 21.1 sec;   INR: 1.81 ratio         PTT - ( 04 Jun 2021 00:56 )  PTT:>200.0 sec      RADIOLOGY & ADDITIONAL STUDIES

## 2021-06-04 NOTE — CONSULT NOTE ADULT - ATTENDING COMMENTS
images reviewed  This is her second event.  Despite large clot burden, she is showing stability, with normal vitals, and negative biomarkers    Would continue heparin for now  The DOACs are primarily absorbed in the stomach and small intestine.  Might be UNDERdosing with DOAC.  Would favor coumadin with INR 2-3     Dose coumadin yevgeniy Radford [phone]
40-year-old woman with s/o provoked PE in 2019, s/p 1 year of anticoagulation and extensive thrombophilia w/u normal/negatve, developed another PE in the post of setting. Patient understands that she will need to be on life long AC. Pulmonary and cardiology recommends VKA as she may not absorb DOAC due to bariatric surgery. However there is not enough evidence based on randomized data. Patient to discuss with primary hematologist discharge. Management now as per chest disease team.

## 2021-06-05 DIAGNOSIS — Z29.9 ENCOUNTER FOR PROPHYLACTIC MEASURES, UNSPECIFIED: ICD-10-CM

## 2021-06-05 DIAGNOSIS — E66.01 MORBID (SEVERE) OBESITY DUE TO EXCESS CALORIES: ICD-10-CM

## 2021-06-05 DIAGNOSIS — I26.02 SADDLE EMBOLUS OF PULMONARY ARTERY WITH ACUTE COR PULMONALE: ICD-10-CM

## 2021-06-05 LAB
ANION GAP SERPL CALC-SCNC: 13 MMOL/L — SIGNIFICANT CHANGE UP (ref 5–17)
APTT BLD: 57.1 SEC — HIGH (ref 27.5–35.5)
APTT BLD: 85.7 SEC — HIGH (ref 27.5–35.5)
BUN SERPL-MCNC: 6 MG/DL — LOW (ref 7–23)
CALCIUM SERPL-MCNC: 8.9 MG/DL — SIGNIFICANT CHANGE UP (ref 8.4–10.5)
CHLORIDE SERPL-SCNC: 104 MMOL/L — SIGNIFICANT CHANGE UP (ref 96–108)
CO2 SERPL-SCNC: 22 MMOL/L — SIGNIFICANT CHANGE UP (ref 22–31)
CREAT SERPL-MCNC: 0.72 MG/DL — SIGNIFICANT CHANGE UP (ref 0.5–1.3)
GLUCOSE SERPL-MCNC: 86 MG/DL — SIGNIFICANT CHANGE UP (ref 70–99)
HCT VFR BLD CALC: 36.5 % — SIGNIFICANT CHANGE UP (ref 34.5–45)
HGB BLD-MCNC: 11.1 G/DL — LOW (ref 11.5–15.5)
INR BLD: 1.28 RATIO — HIGH (ref 0.88–1.16)
MAGNESIUM SERPL-MCNC: 1.8 MG/DL — SIGNIFICANT CHANGE UP (ref 1.6–2.6)
MCHC RBC-ENTMCNC: 24.4 PG — LOW (ref 27–34)
MCHC RBC-ENTMCNC: 30.4 GM/DL — LOW (ref 32–36)
MCV RBC AUTO: 80.2 FL — SIGNIFICANT CHANGE UP (ref 80–100)
NRBC # BLD: 0 /100 WBCS — SIGNIFICANT CHANGE UP (ref 0–0)
NT-PROBNP SERPL-SCNC: 27 PG/ML — SIGNIFICANT CHANGE UP (ref 0–300)
PHOSPHATE SERPL-MCNC: 3 MG/DL — SIGNIFICANT CHANGE UP (ref 2.5–4.5)
PLATELET # BLD AUTO: 287 K/UL — SIGNIFICANT CHANGE UP (ref 150–400)
POTASSIUM SERPL-MCNC: 4.3 MMOL/L — SIGNIFICANT CHANGE UP (ref 3.5–5.3)
POTASSIUM SERPL-SCNC: 4.3 MMOL/L — SIGNIFICANT CHANGE UP (ref 3.5–5.3)
PROTHROM AB SERPL-ACNC: 15.2 SEC — HIGH (ref 10.6–13.6)
RBC # BLD: 4.55 M/UL — SIGNIFICANT CHANGE UP (ref 3.8–5.2)
RBC # FLD: 18 % — HIGH (ref 10.3–14.5)
SODIUM SERPL-SCNC: 139 MMOL/L — SIGNIFICANT CHANGE UP (ref 135–145)
TROPONIN T, HIGH SENSITIVITY RESULT: 6 NG/L — SIGNIFICANT CHANGE UP (ref 0–51)
WBC # BLD: 5.93 K/UL — SIGNIFICANT CHANGE UP (ref 3.8–10.5)
WBC # FLD AUTO: 5.93 K/UL — SIGNIFICANT CHANGE UP (ref 3.8–10.5)

## 2021-06-05 PROCEDURE — 99233 SBSQ HOSP IP/OBS HIGH 50: CPT

## 2021-06-05 RX ORDER — HEPARIN SODIUM 5000 [USP'U]/ML
1200 INJECTION INTRAVENOUS; SUBCUTANEOUS
Qty: 25000 | Refills: 0 | Status: DISCONTINUED | OUTPATIENT
Start: 2021-06-05 | End: 2021-06-05

## 2021-06-05 RX ORDER — ENOXAPARIN SODIUM 100 MG/ML
130 INJECTION SUBCUTANEOUS
Refills: 0 | Status: DISCONTINUED | OUTPATIENT
Start: 2021-06-05 | End: 2021-06-06

## 2021-06-05 RX ORDER — WARFARIN SODIUM 2.5 MG/1
7.5 TABLET ORAL ONCE
Refills: 0 | Status: DISCONTINUED | OUTPATIENT
Start: 2021-06-05 | End: 2021-06-05

## 2021-06-05 RX ADMIN — Medication 1000 UNIT(S): at 11:32

## 2021-06-05 RX ADMIN — OXYCODONE HYDROCHLORIDE 5 MILLIGRAM(S): 5 TABLET ORAL at 00:34

## 2021-06-05 RX ADMIN — PANTOPRAZOLE SODIUM 40 MILLIGRAM(S): 20 TABLET, DELAYED RELEASE ORAL at 17:20

## 2021-06-05 RX ADMIN — OXYCODONE HYDROCHLORIDE 5 MILLIGRAM(S): 5 TABLET ORAL at 01:04

## 2021-06-05 RX ADMIN — Medication 1 TABLET(S): at 11:32

## 2021-06-05 RX ADMIN — PANTOPRAZOLE SODIUM 40 MILLIGRAM(S): 20 TABLET, DELAYED RELEASE ORAL at 05:24

## 2021-06-05 RX ADMIN — ENOXAPARIN SODIUM 130 MILLIGRAM(S): 100 INJECTION SUBCUTANEOUS at 17:35

## 2021-06-05 RX ADMIN — HEPARIN SODIUM 12 UNIT(S)/HR: 5000 INJECTION INTRAVENOUS; SUBCUTANEOUS at 03:40

## 2021-06-05 RX ADMIN — MOMETASONE FUROATE 1 PUFF(S): 220 INHALANT RESPIRATORY (INHALATION) at 09:25

## 2021-06-05 NOTE — PROGRESS NOTE ADULT - SUBJECTIVE AND OBJECTIVE BOX
CARDIOLOGY FOLLOW UP - Dr. Tavarez  DATE OF SERVICE: 6/5/21     CC no cp or sob   oob with no issues       REVIEW OF SYSTEMS:  CONSTITUTIONAL: No fever, weight loss, or fatigue  RESPIRATORY: No cough, wheezing, chills or hemoptysis; No Shortness of Breath  CARDIOVASCULAR: No chest pain, palpitations, passing out, dizziness, or leg swelling  GASTROINTESTINAL: No abdominal or epigastric pain. No nausea, vomiting, or hematemesis; No diarrhea or constipation. No melena or hematochezia.  VASCULAR: No edema     PHYSICAL EXAM:  T(C): 36.4 (06-05-21 @ 04:24), Max: 36.6 (06-04-21 @ 15:00)  HR: 78 (06-05-21 @ 04:24) (66 - 106)  BP: 91/64 (06-05-21 @ 04:24) (91/64 - 129/82)  RR: 19 (06-05-21 @ 04:24) (16 - 47)  SpO2: 92% (06-05-21 @ 04:24) (71% - 98%)  Wt(kg): --  I&O's Summary    04 Jun 2021 07:01  -  05 Jun 2021 07:00  --------------------------------------------------------  IN: 302 mL / OUT: 0 mL / NET: 302 mL        Appearance: Normal	  Cardiovascular: Normal S1 S2,RRR, No JVD, No murmurs  Respiratory: Lungs clear to auscultation	  Gastrointestinal:  Soft, Non-tender, + BS	  Extremities: Normal range of motion, No clubbing, cyanosis or edema      Home Medications:  Arnuity Ellipta 100 mcg inhalation powder: 1 puff(s) inhaled every 24 hours (26 May 2021 10:51)  calcium carbonate 550 mg oral tablet, chewable: 1 tab(s) orally once a day (04 Jun 2021 06:25)  Centrum Chewables Adults oral tablet, chewable: 1 tab(s) orally 4 times a day (26 May 2021 10:51)  omeprazole 40 mg oral delayed release capsule: 1 cap(s) orally 2 times a day (26 May 2021 10:51)  Vitamin D3 1000 intl units (25 mcg) oral tablet, chewable: 1 tab(s) orally once a day (04 Jun 2021 06:22)  Xanax 0.5 mg oral tablet: 1/2 tab(s) orally once a day (at bedtime), As Needed - for anxiety, for insomnia (26 May 2021 10:51)      MEDICATIONS  (STANDING):  calcium carbonate    500 mG (Tums) Chewable 1 Tablet(s) Chew daily  cholecalciferol 1000 Unit(s) Oral daily  heparin  Infusion 1200 Unit(s)/Hr (12 mL/Hr) IV Continuous <Continuous>  mometasone 220 MICROgram(s) Inhaler 1 Puff(s) Inhalation daily  multivitamin/minerals Oral Solution (WELLESSE) 30 milliLiter(s) Oral daily  pantoprazole  Injectable 40 milliGRAM(s) IV Push two times a day      TELEMETRY: 	    ECG:  	  RADIOLOGY:   DIAGNOSTIC TESTING:  [ ] Echocardiogram:  [ ]  Catheterization:  [ ] Stress Test:    OTHER: 	    LABS:	 	    Troponin T, High Sensitivity Result: 8 ng/L [0 - 51] (06-04 @ 00:56)  Troponin T, High Sensitivity Result: 9 ng/L [0 - 51] (06-03 @ 17:39)                          11.3   7.02  )-----------( 290      ( 04 Jun 2021 09:31 )             37.9     06-04    137  |  103  |  6<L>  ----------------------------<  121<H>  3.8   |  16<L>  |  0.54    Ca    8.8      04 Jun 2021 00:56  Phos  3.0     06-04  Mg     1.7     06-04    TPro  6.7  /  Alb  3.5  /  TBili  0.5  /  DBili  x   /  AST  30  /  ALT  41  /  AlkPhos  99  06-04    PT/INR - ( 04 Jun 2021 00:56 )   PT: 21.1 sec;   INR: 1.81 ratio         PTT - ( 05 Jun 2021 01:26 )  PTT:85.7 sec

## 2021-06-05 NOTE — PROGRESS NOTE ADULT - ASSESSMENT
40F Hx asthma w/ multiple prior intubations, idiopathic PE in 2018 (on Xarelto till 1/2020 - unclear prior APLS diagnosis), morbid obesity s/p lap sleeve gastrectomy in 2012 w/ recent revision on 5/14/2021 who p/w saddle PE w/ RV strain, currently on systemic Heparin with bridge to Coumadin

## 2021-06-05 NOTE — DIETITIAN INITIAL EVALUATION ADULT. - ADD RECOMMEND
1) Continue current diet as tolerated. 2) Continue current micronutrient supplementation. 3) RD to remain available and follow-up as medically appropriate. 4) BMI > 40kg/m2 placed in EMR

## 2021-06-05 NOTE — PROGRESS NOTE ADULT - ASSESSMENT
Echo Limited 6/4/21  Endocardial visualization enhanced with intravenous injection of Ultrasonic Enhancing Agent (Definity). Normal  right ventricular size at base  with decreased right  ventricular systolic function. Image 62  Echo 6/3/21: EF 76%, hyperdynamic LV fx, the right ventricle is not well visualized; grossly right ventricular enlargement with normal right ventricular  systolic function        A/P:   40F Hx asthma w/ multiple prior intubations, idiopathic PE in 2018 (on Xarelto till 1/2020 - unclear prior APLS diagnosis), morbid obesity s/p lap sleeve gastrectomy in 2012 w/ recent revision on 5/14 who p/w saddle PE w/ RV strain although HD stable on RA, currently on systemic Heparin with bridge to Coumadin     1. Saddle PE w/ RV strain   - likely  in the setting of recent surgery.  - Remains HD stable   - Systemic Heparin, bridge to Coumadin - however pt is Refusing Coumadin. Hem/onc to follow up   - repeat echo w/  Normal right ventricular size at base  with decreased right  ventricular systolic function.    - Vascular cards following     2. LLE phlebitis  -med f/u

## 2021-06-05 NOTE — DIETITIAN INITIAL EVALUATION ADULT. - PERSON TAUGHT/METHOD
importance of protein intake, continuing to limit concentrated sweets./verbal instruction/patient instructed/teach back - (Patient repeats in own words)

## 2021-06-05 NOTE — PROGRESS NOTE ADULT - SUBJECTIVE AND OBJECTIVE BOX
Patient is a 40y old  Female who presents with a chief complaint of PE (05 Jun 2021 08:22)      SUBJECTIVE / OVERNIGHT EVENTS:  no acute events overnight, vss, afebrile  pt feels well this morning and denies chest pain, dyspnea  pt expresses concerns about coumadin and states that she will NOT take coumadin  she is okay with lovenox but prefers eliquis or xarelto  explained to her that due to her BMI, not an optimal choice but pt hopeful that she would lose more weight and be effective    tele reviewed: sinus 50-90s    ROS:  14 point ROS negative in detail except stated as above    MEDICATIONS  (STANDING):  calcium carbonate    500 mG (Tums) Chewable 1 Tablet(s) Chew daily  cholecalciferol 1000 Unit(s) Oral daily  heparin  Infusion 1200 Unit(s)/Hr (12 mL/Hr) IV Continuous <Continuous>  mometasone 220 MICROgram(s) Inhaler 1 Puff(s) Inhalation daily  multivitamin/minerals Oral Solution (WELLESSE) 30 milliLiter(s) Oral daily  pantoprazole  Injectable 40 milliGRAM(s) IV Push two times a day  warfarin 7.5 milliGRAM(s) Oral once    MEDICATIONS  (PRN):  oxyCODONE    Solution 5 milliGRAM(s) Oral every 6 hours PRN Severe Pain (7 - 10)      CAPILLARY BLOOD GLUCOSE        I&O's Summary    04 Jun 2021 07:01  -  05 Jun 2021 07:00  --------------------------------------------------------  IN: 302 mL / OUT: 0 mL / NET: 302 mL        PHYSICAL EXAM:  Vital Signs Last 24 Hrs  T(C): 36.4 (05 Jun 2021 04:24), Max: 36.6 (04 Jun 2021 15:00)  T(F): 97.5 (05 Jun 2021 04:24), Max: 97.9 (04 Jun 2021 15:00)  HR: 78 (05 Jun 2021 04:24) (66 - 106)  BP: 91/64 (05 Jun 2021 04:24) (91/64 - 129/82)  BP(mean): 73 (05 Jun 2021 04:24) (73 - 105)  RR: 19 (05 Jun 2021 04:24) (16 - 47)  SpO2: 92% (05 Jun 2021 04:24) (71% - 98%)    GENERAL: NAD, well-developed  HEAD:  Atraumatic, Normocephalic  EYES: EOMI, PERRLA, conjunctiva and sclera clear  NECK: Supple, No JVD  CHEST/LUNG: Clear to auscultation bilaterally; No wheeze  HEART: Regular rate and rhythm; No murmurs, rubs, or gallops  ABDOMEN: Soft, Nontender, Nondistended; Bowel sounds present  EXTREMITIES:  2+ Peripheral Pulses, No clubbing, cyanosis, or edema  NEUROLOGY: AAOx3; non-focal  SKIN: No rashes or lesions    LABS:  personally reviewed                        11.3   7.02  )-----------( 290      ( 04 Jun 2021 09:31 )             37.9     06-04    137  |  103  |  6<L>  ----------------------------<  121<H>  3.8   |  16<L>  |  0.54    Ca    8.8      04 Jun 2021 00:56  Phos  3.0     06-04  Mg     1.7     06-04    TPro  6.7  /  Alb  3.5  /  TBili  0.5  /  DBili  x   /  AST  30  /  ALT  41  /  AlkPhos  99  06-04    PT/INR - ( 04 Jun 2021 00:56 )   PT: 21.1 sec;   INR: 1.81 ratio         PTT - ( 05 Jun 2021 01:26 )  PTT:85.7 sec          RADIOLOGY & ADDITIONAL TESTS:    Imaging Personally Reviewed:  < from: VA Duplex Lower Ext Vein Scan, Right (06.04.21 @ 11:52) >    There is normal compressibility of the right common femoral, femoral and popliteal veins.  The contralateral common femoral vein is patent.  Doppler examination shows normal spontaneous and phasic flow.    No calf vein thrombosis is detected.    IMPRESSION:  No evidence of right lower extremity deep venous thrombosis.    < from: Limited Transthoracic Echo (w/Cont) (06.04.21 @ 09:42) >  Limited TTE   Endocardial visualization enhanced with intravenous  injection of Ultrasonic Enhancing Agent (Definity). Normal  right ventricular size at base with decreased right  ventricular systolic function. Image 62    Consultant(s) Notes Reviewed:  hematology    Care Discussed with Consultants/Other Providers: Dr. Monahan ([facility])

## 2021-06-05 NOTE — DIETITIAN INITIAL EVALUATION ADULT. - CHIEF COMPLAINT
This is a "39 y/o female with a PMH of asthma (intubated 4 times, last in 1992), PE in October of 2018 (off all anticoagulation since 1/2020), GERD, morbid obesity s/p sleeve gastrectomy (2012, in [place]), s/p upper endoscopy, laparoscopic gastrectomy revision, and duodenal switch (5/14/21, Dr. Kay, [facility]), now presenting with pulmonary embolism."

## 2021-06-05 NOTE — DIETITIAN INITIAL EVALUATION ADULT. - OTHER INFO
Pt denies chewing/swallowing difficulty, nausea, vomiting, diarrhea, constipation. Takes multivitamin, vitamin D and calcium supplements (chewable).     Weights: pt reports wt prior to most recent surgery was ~280lbs. Was ~340lbs last year. Current dosing weight is 285lbs (6/3).      Since admission pt reports good tolerance to phase 3 bariatric diet. Denies and nausea, emesis. Encouraged intake of protein rich foods and discussed additional ways to help try to meet protein needs at home. Pt plans to try unflavored protein powder to add to foods to supplement. Pt encouraged to follow-up with PMD to monitor for any nutritional deficiencies. Plan to start coumadin in-house however pt reports she does not was to start it, reports already discussing alternatives to MD today prior to RD visit. Education regarding coumadin and vitamin K interaction deferred. Patient with no nutrition-related questions at this time. Made aware RD remains available as needed.

## 2021-06-05 NOTE — DIETITIAN INITIAL EVALUATION ADULT. - ORAL INTAKE PTA/DIET HISTORY
Pt s/p duodenal switch on 5/14. Reports following with outpatient RD and MD. Has been on regular soft diet, focusing on high protein foods and limiting intake of concentrated sweets. Notes difficulty tolerating larger portions, so has been trying eating small frequent meals though notes difficulty meeting protein needs. Has been having greek yogurt, turkey burger, turkey roll-ups. Notes difficulty tolerating Premier Protein shakes, has tried others as well with same issue. NKFA.

## 2021-06-06 ENCOUNTER — TRANSCRIPTION ENCOUNTER (OUTPATIENT)
Age: 41
End: 2021-06-06

## 2021-06-06 VITALS
OXYGEN SATURATION: 95 % | RESPIRATION RATE: 18 BRPM | TEMPERATURE: 98 F | DIASTOLIC BLOOD PRESSURE: 79 MMHG | HEART RATE: 77 BPM | SYSTOLIC BLOOD PRESSURE: 113 MMHG

## 2021-06-06 LAB
HCT VFR BLD CALC: 40.4 % — SIGNIFICANT CHANGE UP (ref 34.5–45)
HGB BLD-MCNC: 12 G/DL — SIGNIFICANT CHANGE UP (ref 11.5–15.5)
INR BLD: 1.16 RATIO — SIGNIFICANT CHANGE UP (ref 0.88–1.16)
MCHC RBC-ENTMCNC: 23.9 PG — LOW (ref 27–34)
MCHC RBC-ENTMCNC: 29.7 GM/DL — LOW (ref 32–36)
MCV RBC AUTO: 80.5 FL — SIGNIFICANT CHANGE UP (ref 80–100)
NRBC # BLD: 0 /100 WBCS — SIGNIFICANT CHANGE UP (ref 0–0)
PLATELET # BLD AUTO: 318 K/UL — SIGNIFICANT CHANGE UP (ref 150–400)
PROTHROM AB SERPL-ACNC: 13.8 SEC — HIGH (ref 10.6–13.6)
RBC # BLD: 5.02 M/UL — SIGNIFICANT CHANGE UP (ref 3.8–5.2)
RBC # FLD: 18.4 % — HIGH (ref 10.3–14.5)
WBC # BLD: 7.43 K/UL — SIGNIFICANT CHANGE UP (ref 3.8–10.5)
WBC # FLD AUTO: 7.43 K/UL — SIGNIFICANT CHANGE UP (ref 3.8–10.5)

## 2021-06-06 PROCEDURE — 85379 FIBRIN DEGRADATION QUANT: CPT

## 2021-06-06 PROCEDURE — U0003: CPT

## 2021-06-06 PROCEDURE — 84295 ASSAY OF SERUM SODIUM: CPT

## 2021-06-06 PROCEDURE — 86900 BLOOD TYPING SEROLOGIC ABO: CPT

## 2021-06-06 PROCEDURE — 83036 HEMOGLOBIN GLYCOSYLATED A1C: CPT

## 2021-06-06 PROCEDURE — C8929: CPT

## 2021-06-06 PROCEDURE — 85014 HEMATOCRIT: CPT

## 2021-06-06 PROCEDURE — 85025 COMPLETE CBC W/AUTO DIFF WBC: CPT

## 2021-06-06 PROCEDURE — 99239 HOSP IP/OBS DSCHRG MGMT >30: CPT

## 2021-06-06 PROCEDURE — 94640 AIRWAY INHALATION TREATMENT: CPT

## 2021-06-06 PROCEDURE — 82435 ASSAY OF BLOOD CHLORIDE: CPT

## 2021-06-06 PROCEDURE — 84484 ASSAY OF TROPONIN QUANT: CPT

## 2021-06-06 PROCEDURE — 80048 BASIC METABOLIC PNL TOTAL CA: CPT

## 2021-06-06 PROCEDURE — 93005 ELECTROCARDIOGRAM TRACING: CPT

## 2021-06-06 PROCEDURE — 85610 PROTHROMBIN TIME: CPT

## 2021-06-06 PROCEDURE — 93971 EXTREMITY STUDY: CPT

## 2021-06-06 PROCEDURE — 71275 CT ANGIOGRAPHY CHEST: CPT

## 2021-06-06 PROCEDURE — 85730 THROMBOPLASTIN TIME PARTIAL: CPT

## 2021-06-06 PROCEDURE — 83880 ASSAY OF NATRIURETIC PEPTIDE: CPT

## 2021-06-06 PROCEDURE — 82947 ASSAY GLUCOSE BLOOD QUANT: CPT

## 2021-06-06 PROCEDURE — 86901 BLOOD TYPING SEROLOGIC RH(D): CPT

## 2021-06-06 PROCEDURE — 84100 ASSAY OF PHOSPHORUS: CPT

## 2021-06-06 PROCEDURE — 85027 COMPLETE CBC AUTOMATED: CPT

## 2021-06-06 PROCEDURE — 86769 SARS-COV-2 COVID-19 ANTIBODY: CPT

## 2021-06-06 PROCEDURE — 83735 ASSAY OF MAGNESIUM: CPT

## 2021-06-06 PROCEDURE — 83605 ASSAY OF LACTIC ACID: CPT

## 2021-06-06 PROCEDURE — 82330 ASSAY OF CALCIUM: CPT

## 2021-06-06 PROCEDURE — 80061 LIPID PANEL: CPT

## 2021-06-06 PROCEDURE — U0005: CPT

## 2021-06-06 PROCEDURE — 82803 BLOOD GASES ANY COMBINATION: CPT

## 2021-06-06 PROCEDURE — 80053 COMPREHEN METABOLIC PANEL: CPT

## 2021-06-06 PROCEDURE — 85018 HEMOGLOBIN: CPT

## 2021-06-06 PROCEDURE — 99285 EMERGENCY DEPT VISIT HI MDM: CPT | Mod: 25

## 2021-06-06 PROCEDURE — 86850 RBC ANTIBODY SCREEN: CPT

## 2021-06-06 PROCEDURE — 93321 DOPPLER ECHO F-UP/LMTD STD: CPT

## 2021-06-06 PROCEDURE — 71045 X-RAY EXAM CHEST 1 VIEW: CPT

## 2021-06-06 PROCEDURE — 84443 ASSAY THYROID STIM HORMONE: CPT

## 2021-06-06 PROCEDURE — C8924: CPT

## 2021-06-06 PROCEDURE — 84132 ASSAY OF SERUM POTASSIUM: CPT

## 2021-06-06 PROCEDURE — 84702 CHORIONIC GONADOTROPIN TEST: CPT

## 2021-06-06 RX ORDER — CALCIUM CARBONATE 500(1250)
1 TABLET ORAL
Qty: 0 | Refills: 0 | DISCHARGE

## 2021-06-06 RX ORDER — CALCIUM CARBONATE 500(1250)
1 TABLET ORAL
Qty: 0 | Refills: 0 | DISCHARGE
Start: 2021-06-06

## 2021-06-06 RX ORDER — ONDANSETRON 8 MG/1
4 TABLET, FILM COATED ORAL ONCE
Refills: 0 | Status: COMPLETED | OUTPATIENT
Start: 2021-06-06 | End: 2021-06-06

## 2021-06-06 RX ORDER — ENOXAPARIN SODIUM 100 MG/ML
150 INJECTION SUBCUTANEOUS
Qty: 60 | Refills: 0
Start: 2021-06-06 | End: 2021-07-05

## 2021-06-06 RX ADMIN — PANTOPRAZOLE SODIUM 40 MILLIGRAM(S): 20 TABLET, DELAYED RELEASE ORAL at 05:43

## 2021-06-06 RX ADMIN — ONDANSETRON 4 MILLIGRAM(S): 8 TABLET, FILM COATED ORAL at 05:06

## 2021-06-06 RX ADMIN — ENOXAPARIN SODIUM 130 MILLIGRAM(S): 100 INJECTION SUBCUTANEOUS at 05:44

## 2021-06-06 NOTE — DISCHARGE NOTE PROVIDER - NSDCCPCAREPLAN_GEN_ALL_CORE_FT
PRINCIPAL DISCHARGE DIAGNOSIS  Diagnosis: Acute saddle pulmonary embolism with acute cor pulmonale  Assessment and Plan of Treatment: Take your anticoagulation (lovenox, coumadin) as directed.  Follow up with your health care provider within one week. Call for appointment.  If you develop shortness of breath or if your shortness of breath worsens call your Health Care Provider or go to the Emergency Department.

## 2021-06-06 NOTE — DISCHARGE NOTE PROVIDER - NSDCFUSCHEDAPPT_GEN_ALL_CORE_FT
ARTURO JOSEPH ; 06/08/2021 ; [facility]  [facility]

## 2021-06-06 NOTE — DISCHARGE NOTE PROVIDER - DETAILS OF MALNUTRITION DIAGNOSIS/DIAGNOSES
This patient has been assessed with a concern for Malnutrition and was treated during this hospitalization for the following Nutrition diagnosis/diagnoses:     -  06/05/2021: Morbid obesity (BMI > 40)

## 2021-06-06 NOTE — DISCHARGE NOTE PROVIDER - PROVIDER TOKENS
PROVIDER:[TOKEN:[[id number]]] PROVIDER:[TOKEN:[[id number]]],PROVIDER:[TOKEN:[[id number]]]

## 2021-06-06 NOTE — DISCHARGE NOTE PROVIDER - NSDCFUADDAPPT_GEN_ALL_CORE_FT
Follow up with your PMD as an outpatient.  Follow up with your PMD as an outpatient.   Follow up with Hematologist as an outpatient.

## 2021-06-06 NOTE — DISCHARGE NOTE PROVIDER - NSDCMRMEDTOKEN_GEN_ALL_CORE_FT
Arnuity Ellipta 100 mcg inhalation powder: 1 puff(s) inhaled every 24 hours  calcium carbonate 500 mg (200 mg elemental calcium) oral tablet, chewable: 1 tab(s) orally once a day  Centrum Chewables Adults oral tablet, chewable: 1 tab(s) orally 4 times a day  omeprazole 40 mg oral delayed release capsule: 1 cap(s) orally 2 times a day  Vitamin D3 1000 intl units (25 mcg) oral tablet, chewable: 1 tab(s) orally once a day  Xanax 0.5 mg oral tablet: 1/2 tab(s) orally once a day (at bedtime), As Needed - for anxiety, for insomnia   Arnuity Ellipta 100 mcg inhalation powder: 1 puff(s) inhaled every 24 hours  calcium carbonate 500 mg (200 mg elemental calcium) oral tablet, chewable: 1 tab(s) orally once a day  Centrum Chewables Adults oral tablet, chewable: 1 tab(s) orally 4 times a day  Lovenox 150 mg/mL injectable solution: 150 milligram(s) subcutaneously every 12 hours,   please discard 20mg, patient should be taking 130mg 2x a day   omeprazole 40 mg oral delayed release capsule: 1 cap(s) orally 2 times a day  Vitamin D3 1000 intl units (25 mcg) oral tablet, chewable: 1 tab(s) orally once a day  Xanax 0.5 mg oral tablet: 1/2 tab(s) orally once a day (at bedtime), As Needed - for anxiety, for insomnia

## 2021-06-06 NOTE — PROGRESS NOTE ADULT - ASSESSMENT
Echo Limited 6/4/21  Endocardial visualization enhanced with intravenous injection of Ultrasonic Enhancing Agent (Definity). Normal  right ventricular size at base  with decreased right  ventricular systolic function. Image 62  Echo 6/3/21: EF 76%, hyperdynamic LV fx, the right ventricle is not well visualized; grossly right ventricular enlargement with normal right ventricular  systolic function        A/P:   40F Hx asthma w/ multiple prior intubations, idiopathic PE in 2018 (on Xarelto till 1/2020 - unclear prior APLS diagnosis), morbid obesity s/p lap sleeve gastrectomy in 2012 w/ recent revision on 5/14 who p/w saddle PE w/ RV strain although HD stable on RA, currently on systemic Heparin with bridge to Coumadin     1. Saddle PE w/ RV strain   - likely  in the setting of recent surgery.  - Remains HD stable   - pt is Refusing Coumadin. Hem/onc to follow up - now on Lovenox   - repeat echo w/  Normal right ventricular size at base  with decreased right  ventricular systolic function.        2. LLE phlebitis  -med f/u     dcp per primary team      Echo Limited 6/4/21  Endocardial visualization enhanced with intravenous injection of Ultrasonic Enhancing Agent (Definity). Normal  right ventricular size at base  with decreased right  ventricular systolic function. Image 62  Echo 6/3/21: EF 76%, hyperdynamic LV fx, the right ventricle is not well visualized; grossly right ventricular enlargement with normal right ventricular  systolic function        A/P:   40F Hx asthma w/ multiple prior intubations, idiopathic PE in 2018 (on Xarelto till 1/2020 - unclear prior APLS diagnosis), morbid obesity s/p lap sleeve gastrectomy in 2012 w/ recent revision on 5/14 who p/w saddle PE w/ RV strain although HD stable on RA, currently on systemic Heparin with bridge to Coumadin     1. Saddle PE w/ RV strain   - in the setting of recent surgery.  - Remains HD stable   - pt is Refusing Coumadin. Hem/onc to follow up - now on Lovenox   - repeat echo w/  Normal right ventricular size at base  with decreased right  ventricular systolic function.        2. LLE phlebitis  -med f/u     dcp per primary team

## 2021-06-06 NOTE — PROGRESS NOTE ADULT - PROBLEM SELECTOR PLAN 4
DVT ppx: on heparin gtt  Dispo: no skilled PT needs    Above plans discussed with NP JANE Martinez MD  Division of Hospital Medicine  Cell: [phone]  Office: [phone]
DVT ppx: lovenox sq  Dispo: no skilled PT needs    Above plans discussed with NP PK  Pt stable for discharge with close follow up with Dr Monahan    44 minutes spent on discharge process    Palma Martinez MD  Division of Hospital Medicine  Cell: [phone]  Office: [phone]

## 2021-06-06 NOTE — PROGRESS NOTE ADULT - PROBLEM SELECTOR PLAN 3
s/p lap sleeve gastrectomy in 2012 w/ recent revision on 5/14/2021  - pt reports ~70lb weight loss since the surgery
s/p lap sleeve gastrectomy in 2012 w/ recent revision on 5/14/2021  - pt reports ~70lb weight loss since the surgery

## 2021-06-06 NOTE — DISCHARGE NOTE PROVIDER - HOSPITAL COURSE
40F Hx asthma w/ multiple prior intubations, idiopathic PE in 2018 (on Xarelto till 1/2020 - unclear prior APLS diagnosis), morbid obesity s/p lap sleeve gastrectomy in 2012 w/ recent revision on 5/14/2021 who p/w saddle PE w/ RV strain, currently on systemic Heparin with bridge to Coumadin    ·  Problem: Acute saddle pulmonary embolism with acute cor pulmonale.  Plan: Saddle PE w/ RV strain, initially monitored in CCU but now on tele floor  - Remains HD stable on RA in NAD  - Systemic Heparin, started on bridge to Coumadin but pt REFUSING coumadin  - Heme also recommends coumadin  - will discuss with Dr. Monahan re: systemic AC choice; possible lovenox and then transition to DOAC once pt loses more weight  - LE doppler without DVT  - Vascular cards to follow.     ·  Problem: Suspected deep vein thrombosis (DVT).  Plan: Phlebitis LLE  - Warm compresses, pain management.     ·  Problem: Morbid obesity with BMI of 50.0-59.9, adult.  Plan: s/p lap sleeve gastrectomy in 2012 w/ recent revision on 5/14/2021  - pt reports ~70lb weight loss since the surgery.       Pt stable dc home with outpatient follow up with PMD.

## 2021-06-06 NOTE — DISCHARGE NOTE PROVIDER - CARE PROVIDER_API CALL
Steve Perales)  Family Medicine  [address], [address]  [place] [address]  Phone: [phone]  Fax: [phone]  Follow Up Time:    Steve Perales)  Family Medicine  [address], [address]  [place] [address]  Phone: [phone]  Fax: [phone]  Follow Up Time:     Yan Monahan)  Hematology; Internal Medicine; Oncology  [address], [place] [id number]  Phone: [phone]  Fax: [phone]  Follow Up Time:

## 2021-06-06 NOTE — DISCHARGE NOTE PROVIDER - CARE PROVIDERS DIRECT ADDRESSES
,DirectAddress_Unknown ,DirectAddress_Unknown,[email or web address]

## 2021-06-06 NOTE — PROGRESS NOTE ADULT - SUBJECTIVE AND OBJECTIVE BOX
CARDIOLOGY FOLLOW UP - Dr. Tavarez  DATE OF SERVICE: 6/6/21     CC no cp or sob       REVIEW OF SYSTEMS:  CONSTITUTIONAL: No fever, weight loss, or fatigue  RESPIRATORY: No cough, wheezing, chills or hemoptysis; No Shortness of Breath  CARDIOVASCULAR: No chest pain, palpitations, passing out, dizziness, or leg swelling  GASTROINTESTINAL: No abdominal or epigastric pain. No nausea, vomiting, or hematemesis; No diarrhea or constipation. No melena or hematochezia.  VASCULAR: No edema     PHYSICAL EXAM:  T(C): 36.8 (06-06-21 @ 04:07), Max: 36.8 (06-06-21 @ 04:07)  HR: 77 (06-06-21 @ 04:07) (77 - 80)  BP: 113/79 (06-06-21 @ 04:07) (104/72 - 113/79)  RR: 18 (06-06-21 @ 04:07) (16 - 18)  SpO2: 95% (06-06-21 @ 04:07) (94% - 95%)  Wt(kg): --  I&O's Summary    05 Jun 2021 07:01  -  06 Jun 2021 07:00  --------------------------------------------------------  IN: 840 mL / OUT: 0 mL / NET: 840 mL        Appearance: Normal	  Cardiovascular: Normal S1 S2,RRR, No JVD, No murmurs  Respiratory: Lungs clear to auscultation	  Gastrointestinal:  Soft, Non-tender, + BS	  Extremities: Normal range of motion, No clubbing, cyanosis or edema      Home Medications:  Arnuity Ellipta 100 mcg inhalation powder: 1 puff(s) inhaled every 24 hours (26 May 2021 10:51)  calcium carbonate 500 mg (200 mg elemental calcium) oral tablet, chewable: 1 tab(s) orally once a day (06 Jun 2021 08:15)  Centrum Chewables Adults oral tablet, chewable: 1 tab(s) orally 4 times a day (26 May 2021 10:51)  omeprazole 40 mg oral delayed release capsule: 1 cap(s) orally 2 times a day (26 May 2021 10:51)  Vitamin D3 1000 intl units (25 mcg) oral tablet, chewable: 1 tab(s) orally once a day (04 Jun 2021 06:22)  Xanax 0.5 mg oral tablet: 1/2 tab(s) orally once a day (at bedtime), As Needed - for anxiety, for insomnia (26 May 2021 10:51)      MEDICATIONS  (STANDING):  calcium carbonate    500 mG (Tums) Chewable 1 Tablet(s) Chew daily  cholecalciferol 1000 Unit(s) Oral daily  enoxaparin Injectable 130 milliGRAM(s) SubCutaneous two times a day  mometasone 220 MICROgram(s) Inhaler 1 Puff(s) Inhalation daily  multivitamin/minerals Oral Solution (WELLESSE) 30 milliLiter(s) Oral daily  pantoprazole  Injectable 40 milliGRAM(s) IV Push two times a day      TELEMETRY: nsr 	    ECG:  	  RADIOLOGY:   DIAGNOSTIC TESTING:  [ ] Echocardiogram:  [ ]  Catheterization:  [ ] Stress Test:    OTHER: 	    LABS:	 	    Troponin T, High Sensitivity Result: 6 ng/L [0 - 51] (06-05 @ 13:01)  Troponin T, High Sensitivity Result: 8 ng/L [0 - 51] (06-04 @ 00:56)  Troponin T, High Sensitivity Result: 9 ng/L [0 - 51] (06-03 @ 17:39)                          12.0   7.43  )-----------( 318      ( 06 Jun 2021 05:58 )             40.4     06-05    139  |  104  |  6<L>  ----------------------------<  86  4.3   |  22  |  0.72    Ca    8.9      05 Jun 2021 13:01  Phos  3.0     06-05  Mg     1.8     06-05      PT/INR - ( 06 Jun 2021 05:58 )   PT: 13.8 sec;   INR: 1.16 ratio         PTT - ( 05 Jun 2021 13:01 )  PTT:57.1 sec

## 2021-06-06 NOTE — PROGRESS NOTE ADULT - PROBLEM SELECTOR PLAN 1
Saddle PE w/ RV strain, initially monitored in CCU but now on tele floor  - Remains HD stable on RA in NAD  - Systemic Heparin, started on bridge to Coumadin but pt REFUSING coumadin  - Heme also recommends coumadin  - will discuss with Dr. Monahan re: systemic AC choice; possible lovenox and then transition to DOAC once pt loses more weight  - LE doppler without DVT  - Vascular cards to follow
Saddle PE w/ RV strain, initially monitored in CCU but now on tele floor  - Remains HD stable on RA in NAD  - Systemic Heparin, started on bridge to Coumadin but pt REFUSING coumadin  - started on lovenox 130 BID  - will discuss with Dr. Monahan re: systemic AC choice; possible lovenox and then transition to DOAC once pt loses more weight  - LE doppler without DVT  - Vascular cards to follow

## 2021-06-06 NOTE — PROGRESS NOTE ADULT - SUBJECTIVE AND OBJECTIVE BOX
Patient is a 40y old  Female who presents with a chief complaint of PE (06 Jun 2021 08:52)      SUBJECTIVE / OVERNIGHT EVENTS:  no acute events overnight, vss, afebrile  pt happy with lovenox >> coumadin  has no complaints    tele reviewed: sinus 50-120s    ROS:  14 point ROS negative in detail except stated as above    MEDICATIONS  (STANDING):  calcium carbonate    500 mG (Tums) Chewable 1 Tablet(s) Chew daily  cholecalciferol 1000 Unit(s) Oral daily  enoxaparin Injectable 130 milliGRAM(s) SubCutaneous two times a day  mometasone 220 MICROgram(s) Inhaler 1 Puff(s) Inhalation daily  multivitamin/minerals Oral Solution (WELLESSE) 30 milliLiter(s) Oral daily  pantoprazole  Injectable 40 milliGRAM(s) IV Push two times a day    MEDICATIONS  (PRN):  oxyCODONE    Solution 5 milliGRAM(s) Oral every 6 hours PRN Severe Pain (7 - 10)      CAPILLARY BLOOD GLUCOSE        I&O's Summary    05 Jun 2021 07:01  -  06 Jun 2021 07:00  --------------------------------------------------------  IN: 840 mL / OUT: 0 mL / NET: 840 mL        PHYSICAL EXAM:  Vital Signs Last 24 Hrs  T(C): 36.8 (06 Jun 2021 04:07), Max: 36.8 (06 Jun 2021 04:07)  T(F): 98.3 (06 Jun 2021 04:07), Max: 98.3 (06 Jun 2021 04:07)  HR: 77 (06 Jun 2021 04:07) (77 - 80)  BP: 113/79 (06 Jun 2021 04:07) (104/72 - 113/79)  BP(mean): --  RR: 18 (06 Jun 2021 04:07) (16 - 18)  SpO2: 95% (06 Jun 2021 04:07) (94% - 95%)    GENERAL: NAD, well-developed  HEAD:  Atraumatic, Normocephalic  EYES: EOMI, PERRLA, conjunctiva and sclera clear  NECK: Supple, No JVD  CHEST/LUNG: Clear to auscultation bilaterally; No wheeze  HEART: Regular rate and rhythm; No murmurs, rubs, or gallops  ABDOMEN: Soft, Nontender, Nondistended; Bowel sounds present  EXTREMITIES:  2+ Peripheral Pulses, No clubbing, cyanosis, or edema  NEUROLOGY: AAOx3; non-focal  SKIN: No rashes or lesions    LABS:                        12.0   7.43  )-----------( 318      ( 06 Jun 2021 05:58 )             40.4     06-05    139  |  104  |  6<L>  ----------------------------<  86  4.3   |  22  |  0.72    Ca    8.9      05 Jun 2021 13:01  Phos  3.0     06-05  Mg     1.8     06-05      PT/INR - ( 06 Jun 2021 05:58 )   PT: 13.8 sec;   INR: 1.16 ratio         PTT - ( 05 Jun 2021 13:01 )  PTT:57.1 sec          RADIOLOGY & ADDITIONAL TESTS:    Imaging Personally Reviewed:    Consultant(s) Notes Reviewed:  heme    Care Discussed with Consultants/Other Providers: Dr. Frey

## 2021-06-06 NOTE — PROGRESS NOTE ADULT - TIME BILLING
Patient seen and examined, agree with the above assessment and plan by MAURICIO LYMAN stable  Cont AC  Heme followup  ECHO w/ right ventricular enlargement w/ normal RV function.
Agree with above NP note.  cv stable  cont a/c per heme/onc  echo with rv dysfxn no clinical RV failure  d/c planning

## 2021-06-06 NOTE — DISCHARGE NOTE PROVIDER - CONDITIONS AT DISCHARGE
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
Cleared by Dr. Martinez

## 2021-06-06 NOTE — DISCHARGE NOTE NURSING/CASE MANAGEMENT/SOCIAL WORK - PATIENT PORTAL LINK FT
You can access the FollowMyHealth Patient Portal offered by [facility] by registering at the following website: [email or web address]. By joining [employer]’s FollowMyHealth portal, you will also be able to view your health information using other applications (apps) compatible with our system.

## 2021-06-06 NOTE — PROGRESS NOTE ADULT - NUTRITIONAL ASSESSMENT
This patient has been assessed with a concern for Malnutrition and has been determined to have a diagnosis/diagnoses of Morbid obesity (BMI > 40).    This patient is being managed with:   Diet Regular-  Phase 3 Bariatric Regular ([id number])  Entered: Jun 4 2021  9:39AM    
This patient has been assessed with a concern for Malnutrition and has been determined to have a diagnosis/diagnoses of Morbid obesity (BMI > 40).    This patient is being managed with:   Diet Regular-  Phase 3 Bariatric Regular ([id number])  Entered: Jun 4 2021  9:39AM

## 2021-06-08 ENCOUNTER — NON-APPOINTMENT (OUTPATIENT)
Age: 41
End: 2021-06-08

## 2021-06-10 ENCOUNTER — RESULT REVIEW (OUTPATIENT)
Age: 41
End: 2021-06-10

## 2021-06-10 ENCOUNTER — APPOINTMENT (OUTPATIENT)
Dept: INTERNAL MEDICINE | Facility: CLINIC | Age: 41
End: 2021-06-10
Payer: COMMERCIAL

## 2021-06-10 ENCOUNTER — APPOINTMENT (OUTPATIENT)
Dept: HEMATOLOGY ONCOLOGY | Facility: CLINIC | Age: 41
End: 2021-06-10
Payer: COMMERCIAL

## 2021-06-10 VITALS
WEIGHT: 283.96 LBS | DIASTOLIC BLOOD PRESSURE: 84 MMHG | TEMPERATURE: 97.7 F | SYSTOLIC BLOOD PRESSURE: 116 MMHG | RESPIRATION RATE: 17 BRPM | HEART RATE: 98 BPM | OXYGEN SATURATION: 100 % | BODY MASS INDEX: 53.61 KG/M2 | HEIGHT: 60.98 IN

## 2021-06-10 VITALS
TEMPERATURE: 98 F | OXYGEN SATURATION: 97 % | SYSTOLIC BLOOD PRESSURE: 112 MMHG | DIASTOLIC BLOOD PRESSURE: 80 MMHG | HEART RATE: 85 BPM

## 2021-06-10 DIAGNOSIS — R71.8 OTHER ABNORMALITY OF RED BLOOD CELLS: ICD-10-CM

## 2021-06-10 DIAGNOSIS — R76.8 OTHER SPECIFIED ABNORMAL IMMUNOLOGICAL FINDINGS IN SERUM: ICD-10-CM

## 2021-06-10 DIAGNOSIS — I80.9 PHLEBITIS AND THROMBOPHLEBITIS OF UNSPECIFIED SITE: ICD-10-CM

## 2021-06-10 LAB
BASOPHILS # BLD AUTO: 0.05 K/UL — SIGNIFICANT CHANGE UP (ref 0–0.2)
BASOPHILS NFR BLD AUTO: 0.7 % — SIGNIFICANT CHANGE UP (ref 0–2)
EOSINOPHIL # BLD AUTO: 0.2 K/UL — SIGNIFICANT CHANGE UP (ref 0–0.5)
EOSINOPHIL NFR BLD AUTO: 3 % — SIGNIFICANT CHANGE UP (ref 0–6)
HCT VFR BLD CALC: 39 % — SIGNIFICANT CHANGE UP (ref 34.5–45)
HGB BLD-MCNC: 11.8 G/DL — SIGNIFICANT CHANGE UP (ref 11.5–15.5)
IMM GRANULOCYTES NFR BLD AUTO: 0.3 % — SIGNIFICANT CHANGE UP (ref 0–1.5)
LYMPHOCYTES # BLD AUTO: 1.68 K/UL — SIGNIFICANT CHANGE UP (ref 1–3.3)
LYMPHOCYTES # BLD AUTO: 25.1 % — SIGNIFICANT CHANGE UP (ref 13–44)
MCHC RBC-ENTMCNC: 24.3 PG — LOW (ref 27–34)
MCHC RBC-ENTMCNC: 30.3 G/DL — LOW (ref 32–36)
MCV RBC AUTO: 80.4 FL — SIGNIFICANT CHANGE UP (ref 80–100)
MONOCYTES # BLD AUTO: 0.46 K/UL — SIGNIFICANT CHANGE UP (ref 0–0.9)
MONOCYTES NFR BLD AUTO: 6.9 % — SIGNIFICANT CHANGE UP (ref 2–14)
NEUTROPHILS # BLD AUTO: 4.27 K/UL — SIGNIFICANT CHANGE UP (ref 1.8–7.4)
NEUTROPHILS NFR BLD AUTO: 64 % — SIGNIFICANT CHANGE UP (ref 43–77)
NRBC # BLD: 0 /100 WBCS — SIGNIFICANT CHANGE UP (ref 0–0)
PLATELET # BLD AUTO: 333 K/UL — SIGNIFICANT CHANGE UP (ref 150–400)
RBC # BLD: 4.85 M/UL — SIGNIFICANT CHANGE UP (ref 3.8–5.2)
RBC # FLD: 18.3 % — HIGH (ref 10.3–14.5)
WBC # BLD: 6.68 K/UL — SIGNIFICANT CHANGE UP (ref 3.8–10.5)
WBC # FLD AUTO: 6.68 K/UL — SIGNIFICANT CHANGE UP (ref 3.8–10.5)

## 2021-06-10 PROCEDURE — 99072 ADDL SUPL MATRL&STAF TM PHE: CPT

## 2021-06-10 PROCEDURE — 99214 OFFICE O/P EST MOD 30 MIN: CPT

## 2021-06-10 PROCEDURE — 99495 TRANSJ CARE MGMT MOD F2F 14D: CPT

## 2021-06-10 NOTE — PHYSICAL EXAM
[No Spinal Tenderness] : no spinal tenderness [Normal] : normal gait, coordination grossly intact, no focal deficits and deep tendon reflexes were 2+ and symmetric [de-identified] : paraspinal tednerness over left cervical region  [24814 - Moderate Complexity requires multiple possible diagnoses and/or the management options, moderate complexity of the medical data (tests, etc.) to be reviewed, and moderate risk of significant complications, morbidity, and/or mortality as well as co] : Moderate Complexity

## 2021-06-10 NOTE — ASSESSMENT
[FreeTextEntry1] : 40 female  h/o asthma w/ multiple prior intubations, idiopathic PE in 2018 (on Xarelto till 1/2020 - unclear prior APLS diagnosis), morbid obesity s/p lap sleeve, gastrectomy in 2012 w/ recent revision on 5/14/2021 who p/w saddle PE w/ RV strain, currently on lovenox BID.  \par still feels SOB, and having palpitations.  on apple watch ~100-110\par has appt with hem/onco today \par no fever/chills, coughing, soboe, cp \par no surgical complications.   cleared by surg\par -advsied to follow-up with hem/onco (appt today) likely will need AC indefinitly given h/o PE, thrombocytosis and superficial phlebitis \par -f/u with cardiology regardin RV strain, will need f/u echo \par -f/u with pulmonology \par -recommend more time off given co-morbidities and fatigue \par \par \par

## 2021-06-10 NOTE — HISTORY OF PRESENT ILLNESS
[Discharge Summary] : discharge summary [Pertinent Labs] : pertinent labs [Discharge Med List] : discharge medication list [Med Reconciliation] : medication reconciliation has been completed [Patient Contacted By: ____] : and contacted by [unfilled] [FreeTextEntry2] : 40 female  h/o asthma w/ multiple prior intubations, idiopathic PE in 2018 (on Xarelto till 1/2020 - unclear prior APLS diagnosis), morbid obesity s/p lap sleeve, gastrectomy in 2012 w/ recent revision on 5/14/2021 who p/w saddle PE w/ RV strain, currently on lovenox BID.  \par still feels SOB, and having palpitations.  on apple watch ~100-110\par has appt with hem/onco today \par no fever/chills, coughing, soboe, cp \par \par no surgical complications.   cleared by surg\par \par \par

## 2021-06-13 NOTE — HISTORY OF PRESENT ILLNESS
[de-identified] : Declined coumadin was discharged with lovenox instead of eliqusi due to issues with patient being overwieght and hx of bariatric surgery.  \par Surgery was May 14.  \par Took lovenox at 10:30 today.  \par \par Pulmonary embolism.  Paitnet feeling well following the administration of the lovenox 130mg BID for the past week.  Has palpitations when she stands for a few minutes, that is starting to disipate now.  \par Followign the bariatric surgery is still loosing about 1 lb a day.  Lost a total of 30 lbs since discharge from the bariatric surgery on May 14th.   [de-identified] : 40 year old woman with a history of a extensive unprovoked bilateral PE 2018.  She presents for hematologic reevaluation in the setting of an upcoming IVF procedure with Dr. Dana Bryan planned for late August.  Since the time after the initial b/l PE, patient has not had any recurrent symptoms. Still feels some mild shortness of breath and dyspnea as her baseline. She had seen Dr. Jurado for pulmonary evaluation. She was going to have an echocardiogram and possibly a VQ scan to look for residual disease but this has not yet taken place.  \par Also has an appointment with Dr. Radha Swift upcoming [facility] appointment.  \par \par Also concerned about hereditary cancer syndromes. Mother side had extensive cancer history Maternal grandfather her  of colon cancer, and Maternal uncle  colon cancer and liver cancer\par Father had colon cancer 5 years ago. Also colon liver and uncle \par \par Patient is going for a repeat echocardiogram. \par \par Patient feeling better now following anticoagulation.  \par \par Patient underwent bariatric surgery.  Discharged on the . Gastric sleeve revision and a SATI procedure.  Was at [place].  \par \par Patient on lovenox 40mg BID.   for prophylaxis.  \par \par Thought she had a blood clot on her legs.  Doppler did not show a DVT, had phlebitis.  at the site of a varicose vein.  21  Phlebitis has grown to the size of an orange, but pain has decreased compared to Monday.  Has been mobile, has bee nursing hot packs.  \par Leukemia on maternal cousin\par Grandmother's mother myeloma.  \par Mothers uncle had esophageal cancer.  \par \par \par

## 2021-06-13 NOTE — ASSESSMENT
[FreeTextEntry1] : This is a 39 year old woman with a history of extensive bilateral unprovoked PE in October 30th 2019.  Patient had a hypercoagulable workup at the time, anticardiolipin antibodies were negative, Protein C and S normal, no evidence of a hypercoagulable state.  Patient now presents for re-evaluation in the setting of an upcoming IVF procedure.  The hypercoagulable workup was already negative, patient patient already completed.  the course of 1 year of Eliquis therapy for the PE.  \par Hypercoagulable state evaluation negative. 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  We had prescribe a month of treatment dose Eliquis for the purpose of treating a repeat and refractory superficial thrombosis, howover it appears somewhere along the line prior to her being able to receive it, patient developed a pulmonary embolism.  Patient now on full dose lovenox 130mg/m2 for the 1mg/kg BID regimen.  Will check Anti Factor Xa level to  adequacy of this treatment given the unreliability of weight based lovenox in patients with obesity.

## 2021-06-14 ENCOUNTER — APPOINTMENT (OUTPATIENT)
Dept: OTOLARYNGOLOGY | Facility: HOSPITAL | Age: 41
End: 2021-06-14

## 2021-06-15 ENCOUNTER — APPOINTMENT (OUTPATIENT)
Dept: OTOLARYNGOLOGY | Facility: CLINIC | Age: 41
End: 2021-06-15

## 2021-06-17 ENCOUNTER — APPOINTMENT (OUTPATIENT)
Age: 41
End: 2021-06-17

## 2021-06-17 LAB
ALBUMIN SERPL ELPH-MCNC: 4 G/DL
ALP BLD-CCNC: 94 U/L
ALT SERPL-CCNC: 34 U/L
ANION GAP SERPL CALC-SCNC: 18 MMOL/L
AST SERPL-CCNC: 51 U/L
B2 GLYCOPROT1 IGA SERPL IA-ACNC: <5 SAU
B2 GLYCOPROT1 IGG SER-ACNC: <5 SGU
B2 GLYCOPROT1 IGM SER-ACNC: <5 SMU
BILIRUB SERPL-MCNC: 0.4 MG/DL
BUN SERPL-MCNC: 8 MG/DL
CALCIUM SERPL-MCNC: 9.7 MG/DL
CARDIOLIPIN IGM SER-MCNC: 6.5 MPL
CARDIOLIPIN IGM SER-MCNC: <5 GPL
CHLORIDE SERPL-SCNC: 102 MMOL/L
CO2 SERPL-SCNC: 21 MMOL/L
CREAT SERPL-MCNC: 0.7 MG/DL
DEPRECATED CARDIOLIPIN IGA SER: <5 APL
FACT XA PPP-ACNC: 0.26 IU/ML
FERRITIN SERPL-MCNC: 29 NG/ML
FOLATE SERPL-MCNC: 6.6 NG/ML
GLUCOSE SERPL-MCNC: 82 MG/DL
POTASSIUM SERPL-SCNC: 4.5 MMOL/L
PROT SERPL-MCNC: 6.7 G/DL
SODIUM SERPL-SCNC: 140 MMOL/L
TROPONIN I SERPL-MCNC: <0.01 NG/ML
VIT B12 SERPL-MCNC: 860 PG/ML

## 2021-06-22 ENCOUNTER — APPOINTMENT (OUTPATIENT)
Dept: OTOLARYNGOLOGY | Facility: CLINIC | Age: 41
End: 2021-06-22

## 2021-06-24 ENCOUNTER — NON-APPOINTMENT (OUTPATIENT)
Age: 41
End: 2021-06-24

## 2021-07-19 NOTE — PATIENT PROFILE ADULT - NSASFALLATTEMPTOOB_GEN_A_NUR
Pt was found to have Elevated AST and ALT.   -F/U hepatitis panel.   -CMP daily  - f/u Ultrasound.   - f/u Utox no

## 2021-07-23 ENCOUNTER — NON-APPOINTMENT (OUTPATIENT)
Age: 41
End: 2021-07-23

## 2021-07-28 ENCOUNTER — APPOINTMENT (OUTPATIENT)
Dept: INTERNAL MEDICINE | Facility: CLINIC | Age: 41
End: 2021-07-28

## 2021-07-31 ENCOUNTER — EMERGENCY (EMERGENCY)
Facility: HOSPITAL | Age: 41
LOS: 0 days | Discharge: HOME | End: 2021-07-31
Attending: EMERGENCY MEDICINE
Payer: COMMERCIAL

## 2021-07-31 VITALS
SYSTOLIC BLOOD PRESSURE: 148 MMHG | DIASTOLIC BLOOD PRESSURE: 94 MMHG | HEIGHT: 62 IN | OXYGEN SATURATION: 100 % | RESPIRATION RATE: 18 BRPM | HEART RATE: 65 BPM | WEIGHT: 214.95 LBS

## 2021-07-31 VITALS
SYSTOLIC BLOOD PRESSURE: 121 MMHG | DIASTOLIC BLOOD PRESSURE: 85 MMHG | TEMPERATURE: 98 F | RESPIRATION RATE: 18 BRPM | OXYGEN SATURATION: 100 % | HEART RATE: 60 BPM

## 2021-07-31 DIAGNOSIS — Z98.84 BARIATRIC SURGERY STATUS: Chronic | ICD-10-CM

## 2021-07-31 DIAGNOSIS — Z87.19 PERSONAL HISTORY OF OTHER DISEASES OF THE DIGESTIVE SYSTEM: Chronic | ICD-10-CM

## 2021-07-31 DIAGNOSIS — Z98.890 OTHER SPECIFIED POSTPROCEDURAL STATES: Chronic | ICD-10-CM

## 2021-07-31 DIAGNOSIS — K92.0 HEMATEMESIS: ICD-10-CM

## 2021-07-31 DIAGNOSIS — F41.9 ANXIETY DISORDER, UNSPECIFIED: ICD-10-CM

## 2021-07-31 DIAGNOSIS — Z20.822 CONTACT WITH AND (SUSPECTED) EXPOSURE TO COVID-19: ICD-10-CM

## 2021-07-31 DIAGNOSIS — K21.9 GASTRO-ESOPHAGEAL REFLUX DISEASE WITHOUT ESOPHAGITIS: ICD-10-CM

## 2021-07-31 DIAGNOSIS — G47.00 INSOMNIA, UNSPECIFIED: ICD-10-CM

## 2021-07-31 DIAGNOSIS — Z88.2 ALLERGY STATUS TO SULFONAMIDES: ICD-10-CM

## 2021-07-31 DIAGNOSIS — Z86.711 PERSONAL HISTORY OF PULMONARY EMBOLISM: ICD-10-CM

## 2021-07-31 DIAGNOSIS — R11.2 NAUSEA WITH VOMITING, UNSPECIFIED: ICD-10-CM

## 2021-07-31 DIAGNOSIS — E66.01 MORBID (SEVERE) OBESITY DUE TO EXCESS CALORIES: ICD-10-CM

## 2021-07-31 DIAGNOSIS — J45.909 UNSPECIFIED ASTHMA, UNCOMPLICATED: ICD-10-CM

## 2021-07-31 LAB
ALBUMIN SERPL ELPH-MCNC: 3.2 G/DL — LOW (ref 3.3–5)
ALP SERPL-CCNC: 89 U/L — SIGNIFICANT CHANGE UP (ref 40–120)
ALT FLD-CCNC: 31 U/L — SIGNIFICANT CHANGE UP (ref 12–78)
ANION GAP SERPL CALC-SCNC: 5 MMOL/L — SIGNIFICANT CHANGE UP (ref 5–17)
AST SERPL-CCNC: 18 U/L — SIGNIFICANT CHANGE UP (ref 15–37)
BASOPHILS # BLD AUTO: 0.03 K/UL — SIGNIFICANT CHANGE UP (ref 0–0.2)
BASOPHILS NFR BLD AUTO: 0.5 % — SIGNIFICANT CHANGE UP (ref 0–2)
BILIRUB SERPL-MCNC: 0.5 MG/DL — SIGNIFICANT CHANGE UP (ref 0.2–1.2)
BUN SERPL-MCNC: 9 MG/DL — SIGNIFICANT CHANGE UP (ref 7–23)
CALCIUM SERPL-MCNC: 8.6 MG/DL — SIGNIFICANT CHANGE UP (ref 8.5–10.1)
CHLORIDE SERPL-SCNC: 107 MMOL/L — SIGNIFICANT CHANGE UP (ref 96–108)
CO2 SERPL-SCNC: 25 MMOL/L — SIGNIFICANT CHANGE UP (ref 22–31)
CREAT SERPL-MCNC: 0.45 MG/DL — LOW (ref 0.5–1.3)
EOSINOPHIL # BLD AUTO: 0.06 K/UL — SIGNIFICANT CHANGE UP (ref 0–0.5)
EOSINOPHIL NFR BLD AUTO: 1 % — SIGNIFICANT CHANGE UP (ref 0–6)
GLUCOSE SERPL-MCNC: 85 MG/DL — SIGNIFICANT CHANGE UP (ref 70–99)
HCG SERPL-ACNC: <1 MIU/ML — SIGNIFICANT CHANGE UP
HCT VFR BLD CALC: 38 % — SIGNIFICANT CHANGE UP (ref 34.5–45)
HGB BLD-MCNC: 11.6 G/DL — SIGNIFICANT CHANGE UP (ref 11.5–15.5)
IMM GRANULOCYTES NFR BLD AUTO: 0.2 % — SIGNIFICANT CHANGE UP (ref 0–1.5)
LACTATE SERPL-SCNC: 0.7 MMOL/L — SIGNIFICANT CHANGE UP (ref 0.7–2)
LIDOCAIN IGE QN: 35 U/L — LOW (ref 73–393)
LYMPHOCYTES # BLD AUTO: 1.46 K/UL — SIGNIFICANT CHANGE UP (ref 1–3.3)
LYMPHOCYTES # BLD AUTO: 23.1 % — SIGNIFICANT CHANGE UP (ref 13–44)
MCHC RBC-ENTMCNC: 25.4 PG — LOW (ref 27–34)
MCHC RBC-ENTMCNC: 30.5 GM/DL — LOW (ref 32–36)
MCV RBC AUTO: 83.2 FL — SIGNIFICANT CHANGE UP (ref 80–100)
MONOCYTES # BLD AUTO: 0.46 K/UL — SIGNIFICANT CHANGE UP (ref 0–0.9)
MONOCYTES NFR BLD AUTO: 7.3 % — SIGNIFICANT CHANGE UP (ref 2–14)
NEUTROPHILS # BLD AUTO: 4.29 K/UL — SIGNIFICANT CHANGE UP (ref 1.8–7.4)
NEUTROPHILS NFR BLD AUTO: 67.9 % — SIGNIFICANT CHANGE UP (ref 43–77)
NT-PROBNP SERPL-SCNC: 201 PG/ML — HIGH (ref 0–125)
PLATELET # BLD AUTO: 394 K/UL — SIGNIFICANT CHANGE UP (ref 150–400)
POTASSIUM SERPL-MCNC: 3.8 MMOL/L — SIGNIFICANT CHANGE UP (ref 3.5–5.3)
POTASSIUM SERPL-SCNC: 3.8 MMOL/L — SIGNIFICANT CHANGE UP (ref 3.5–5.3)
PROT SERPL-MCNC: 6.7 GM/DL — SIGNIFICANT CHANGE UP (ref 6–8.3)
RBC # BLD: 4.57 M/UL — SIGNIFICANT CHANGE UP (ref 3.8–5.2)
RBC # FLD: 19 % — HIGH (ref 10.3–14.5)
SODIUM SERPL-SCNC: 137 MMOL/L — SIGNIFICANT CHANGE UP (ref 135–145)
TROPONIN I SERPL-MCNC: <0.015 NG/ML — SIGNIFICANT CHANGE UP (ref 0.01–0.04)
WBC # BLD: 6.31 K/UL — SIGNIFICANT CHANGE UP (ref 3.8–10.5)
WBC # FLD AUTO: 6.31 K/UL — SIGNIFICANT CHANGE UP (ref 3.8–10.5)

## 2021-07-31 PROCEDURE — 76705 ECHO EXAM OF ABDOMEN: CPT | Mod: 26

## 2021-07-31 PROCEDURE — 86900 BLOOD TYPING SEROLOGIC ABO: CPT

## 2021-07-31 PROCEDURE — 96374 THER/PROPH/DIAG INJ IV PUSH: CPT | Mod: XU

## 2021-07-31 PROCEDURE — 93010 ELECTROCARDIOGRAM REPORT: CPT

## 2021-07-31 PROCEDURE — 96375 TX/PRO/DX INJ NEW DRUG ADDON: CPT

## 2021-07-31 PROCEDURE — 99284 EMERGENCY DEPT VISIT MOD MDM: CPT | Mod: 25

## 2021-07-31 PROCEDURE — 36415 COLL VENOUS BLD VENIPUNCTURE: CPT

## 2021-07-31 PROCEDURE — U0003: CPT

## 2021-07-31 PROCEDURE — 83880 ASSAY OF NATRIURETIC PEPTIDE: CPT

## 2021-07-31 PROCEDURE — 83605 ASSAY OF LACTIC ACID: CPT

## 2021-07-31 PROCEDURE — 71045 X-RAY EXAM CHEST 1 VIEW: CPT

## 2021-07-31 PROCEDURE — 80053 COMPREHEN METABOLIC PANEL: CPT

## 2021-07-31 PROCEDURE — 74177 CT ABD & PELVIS W/CONTRAST: CPT | Mod: 26,MA

## 2021-07-31 PROCEDURE — 71045 X-RAY EXAM CHEST 1 VIEW: CPT | Mod: 26

## 2021-07-31 PROCEDURE — 84702 CHORIONIC GONADOTROPIN TEST: CPT

## 2021-07-31 PROCEDURE — 76705 ECHO EXAM OF ABDOMEN: CPT

## 2021-07-31 PROCEDURE — 86850 RBC ANTIBODY SCREEN: CPT

## 2021-07-31 PROCEDURE — C9113: CPT

## 2021-07-31 PROCEDURE — 93005 ELECTROCARDIOGRAM TRACING: CPT

## 2021-07-31 PROCEDURE — 86901 BLOOD TYPING SEROLOGIC RH(D): CPT

## 2021-07-31 PROCEDURE — U0005: CPT

## 2021-07-31 PROCEDURE — 85025 COMPLETE CBC W/AUTO DIFF WBC: CPT

## 2021-07-31 PROCEDURE — 84484 ASSAY OF TROPONIN QUANT: CPT

## 2021-07-31 PROCEDURE — 99285 EMERGENCY DEPT VISIT HI MDM: CPT

## 2021-07-31 PROCEDURE — 96376 TX/PRO/DX INJ SAME DRUG ADON: CPT

## 2021-07-31 PROCEDURE — 74177 CT ABD & PELVIS W/CONTRAST: CPT

## 2021-07-31 PROCEDURE — 83690 ASSAY OF LIPASE: CPT

## 2021-07-31 RX ORDER — ONDANSETRON 8 MG/1
4 TABLET, FILM COATED ORAL ONCE
Refills: 0 | Status: COMPLETED | OUTPATIENT
Start: 2021-07-31 | End: 2021-07-31

## 2021-07-31 RX ORDER — FAMOTIDINE 10 MG/ML
20 INJECTION INTRAVENOUS ONCE
Refills: 0 | Status: COMPLETED | OUTPATIENT
Start: 2021-07-31 | End: 2021-07-31

## 2021-07-31 RX ORDER — MORPHINE SULFATE 50 MG/1
4 CAPSULE, EXTENDED RELEASE ORAL ONCE
Refills: 0 | Status: DISCONTINUED | OUTPATIENT
Start: 2021-07-31 | End: 2021-07-31

## 2021-07-31 RX ORDER — SODIUM CHLORIDE 9 MG/ML
1000 INJECTION INTRAMUSCULAR; INTRAVENOUS; SUBCUTANEOUS ONCE
Refills: 0 | Status: COMPLETED | OUTPATIENT
Start: 2021-07-31 | End: 2021-07-31

## 2021-07-31 RX ORDER — PANTOPRAZOLE SODIUM 20 MG/1
80 TABLET, DELAYED RELEASE ORAL ONCE
Refills: 0 | Status: COMPLETED | OUTPATIENT
Start: 2021-07-31 | End: 2021-07-31

## 2021-07-31 RX ORDER — ONDANSETRON 8 MG/1
1 TABLET, FILM COATED ORAL
Qty: 14 | Refills: 0
Start: 2021-07-31 | End: 2021-08-06

## 2021-07-31 RX ADMIN — MORPHINE SULFATE 4 MILLIGRAM(S): 50 CAPSULE, EXTENDED RELEASE ORAL at 09:36

## 2021-07-31 RX ADMIN — SODIUM CHLORIDE 1000 MILLILITER(S): 9 INJECTION INTRAMUSCULAR; INTRAVENOUS; SUBCUTANEOUS at 06:32

## 2021-07-31 RX ADMIN — PANTOPRAZOLE SODIUM 80 MILLIGRAM(S): 20 TABLET, DELAYED RELEASE ORAL at 07:57

## 2021-07-31 RX ADMIN — ONDANSETRON 4 MILLIGRAM(S): 8 TABLET, FILM COATED ORAL at 06:31

## 2021-07-31 RX ADMIN — FAMOTIDINE 20 MILLIGRAM(S): 10 INJECTION INTRAVENOUS at 09:36

## 2021-07-31 RX ADMIN — MORPHINE SULFATE 4 MILLIGRAM(S): 50 CAPSULE, EXTENDED RELEASE ORAL at 07:57

## 2021-07-31 RX ADMIN — SODIUM CHLORIDE 1000 MILLILITER(S): 9 INJECTION INTRAMUSCULAR; INTRAVENOUS; SUBCUTANEOUS at 06:33

## 2021-07-31 RX ADMIN — ONDANSETRON 4 MILLIGRAM(S): 8 TABLET, FILM COATED ORAL at 07:57

## 2021-07-31 NOTE — ED PROVIDER NOTE - PSH
H/O hiatal hernia  2012 during gastric sleeve surgery  H/O rectal polypectomy  2012 benign  History of weight loss surgery  sleeve gastrectomy revision, YANCI procedure and hiatal hernia repair 5/14/21  Status post laparoscopic sleeve gastrectomy  2012

## 2021-07-31 NOTE — ED PROVIDER NOTE - PROGRESS NOTE DETAILS
shruthi: dr guerreir saw pt & rec's d/c with meds & outpt egd shruthi: cts w/o acute findings, will have bariatric surgeon dr reed see pt

## 2021-07-31 NOTE — ED PROVIDER NOTE - PATIENT PORTAL LINK FT
You can access the FollowMyHealth Patient Portal offered by [facility] by registering at the following website: [email or web address]. By joining [employer]’s FollowMyHealth portal, you will also be able to view your health information using other applications (apps) compatible with our system.

## 2021-07-31 NOTE — ED ADULT TRIAGE NOTE - CHIEF COMPLAINT QUOTE
Pt ambulatory to ED c/o abdominal pain RUQ radiating to right shoulder. Reports decreased PO intake over the last two days and then this AM started vomiting blood.

## 2021-07-31 NOTE — ED PROVIDER NOTE - CLINICAL SUMMARY MEDICAL DECISION MAKING FREE TEXT BOX
upper abd pain with n/v & hemetemesis. will get ct given bariatric surgery. likely admit since Upper GI bleed & pt on lovenox initial plan: upper abd pain with n/v & hemetemesis. will get ct given bariatric surgery. likely admit since Upper GI bleed & pt on lovenox

## 2021-07-31 NOTE — ED PROVIDER NOTE - OBJECTIVE STATEMENT
Pertinent HPI/PMH/PSH/FHx/SHx and Review of Systems contained within  HPI:  Patient p/w CC  hematemesis x 1 days, new onset. abdominal pain RUQ radiating to right shoulder. Reports decreased PO intake over the last two days and then this AM started vomiting blood  PMH/PSH relevant for: asthma (multiple intubations, last 1992), PE on lovenox, morbid obesity s/p lap sleeve gastrectomy in 2012 and gastric sleeve revision with duodenal switch and hiatal hernia repair on 5/14/21   ROS negative for: fever, Chest pain, SOB, dysuria    FamilyHx and SocialHx not otherwise contributory

## 2021-07-31 NOTE — ED ADULT NURSE NOTE - NSIMPLEMENTINTERV_GEN_ALL_ED
Implemented All Universal Safety Interventions:  [place] to call system. Call bell, personal items and telephone within reach. Instruct patient to call for assistance. Room bathroom lighting operational. Non-slip footwear when patient is off stretcher. Physically safe environment: no spills, clutter or unnecessary equipment. Stretcher in lowest position, wheels locked, appropriate side rails in place.

## 2021-07-31 NOTE — ED ADULT NURSE REASSESSMENT NOTE - NS ED NURSE REASSESS COMMENT FT1
Received pt at approximately 10:15 from GIN[facility] Wilburn. Pt currently being evaluated by Dr. Kay, pending further instruction. Pt a&ox4, appears comfortable texting on cellphone.

## 2021-07-31 NOTE — ED PROVIDER NOTE - NSFOLLOWUPINSTRUCTIONS_ED_ALL_ED_FT
FOLLOW UP WITH PMD, SURGEON DR GALVEZ & GI Dr  WITHIN 1-2DAYS, CALL TO MAKE APPOINTMENT  COME BACK TO ED IF YOUR CONDITION WORSENS OR IF YOU DEVELOP FEVER GREATER THAN 100.4F, CHEST PAIN,  SHORTNESS OF BREATH OR ANY OTHER SYMPTOMS CONCERNING TO YOU  TAKE TYLENOL (ACETAMINOPHEN) 650 MG EVERY 6 HOURS AS NEEDED FOR PAIN    IF YOU WERE PRESRCIBED ANY MEDICATIONS FROM TODAY'S VISIT, TAKE THEM AS DIRECTED (zofran, sucralfate, omperazole)    Acute Nausea and Vomiting    WHAT YOU NEED TO KNOW:    Acute nausea and vomiting start suddenly, worsen quickly, and last a short time.    DISCHARGE INSTRUCTIONS:    Return to the emergency department if:     You see blood in your vomit or your bowel movements.      You have sudden, severe pain in your chest and upper abdomen after hard vomiting or retching.      You have swelling in your neck and chest.       You are dizzy, cold, and thirsty and your eyes and mouth are dry.      You are urinating very little or not at all.      You have muscle weakness, leg cramps, and trouble breathing.       Your heart is beating much faster than normal.       You continue to vomit for more than 48 hours.     Contact your healthcare provider if:     You have frequent dry heaves (vomiting but nothing comes out).      Your nausea and vomiting does not get better or go away after you use medicine.      You have questions or concerns about your condition or treatment.    Medicines: You may need any of the following:     Medicines may be given to calm your stomach and stop your vomiting. You may also need medicines to help you feel more relaxed or to stop nausea and vomiting caused by motion sickness.      Gastrointestinal stimulants are used to help empty your stomach and bowels. This may help decrease nausea and vomiting.      Take your medicine as directed. Contact your healthcare provider if you think your medicine is not helping or if you have side effects. Tell him or her if you are allergic to any medicine. Keep a list of the medicines, vitamins, and herbs you take. Include the amounts, and when and why you take them. Bring the list or the pill bottles to follow-up visits. Carry your medicine list with you in case of an emergency.    Prevent or manage acute nausea and vomiting:     Do not drink alcohol. Alcohol may upset or irritate your stomach. Too much alcohol can also cause acute nausea and vomiting.      Control stress. Headaches due to stress may cause nausea and vomiting. Find ways to relax and manage your stress. Get more rest and sleep.      Drink more liquids as directed. Vomiting can lead to dehydration. It is important to drink more liquids to help replace lost body fluids. Ask your healthcare provider how much liquid to drink each day and which liquids are best for you. Your provider may recommend that you drink an oral rehydration solution (ORS). ORS contains water, salts, and sugar that are needed to replace the lost body fluids. Ask what kind of ORS to use, how much to drink, and where to get it.      Eat smaller meals, more often. Eat small amounts of food every 2 to 3 hours, even if you are not hungry. Food in your stomach may decrease your nausea.      Talk to your healthcare provider before you take over-the-counter (OTC) medicines. These medicines can cause serious problems if you use certain other medicines, or you have a medical condition. You may have problems if you use too much or use them for longer than the label says. Follow directions on the label carefully.     Follow up with your healthcare provider as directed: Write down your questions so you remember to ask them during your follow-up visits.

## 2021-07-31 NOTE — ED PROVIDER NOTE - FAMILY HISTORY
Grandparent  Still living? Unknown  Family history of colon cancer, Age at diagnosis: Age Unknown     Grandparent  Still living? No  Family history of multiple myeloma, Age at diagnosis: Age Unknown     Father  Still living? Yes, Estimated age: 61-70  FHx: coronary artery disease, Age at diagnosis: Age Unknown  Family history of hypertension, Age at diagnosis: Age Unknown  Family history of type 2 diabetes mellitus, Age at diagnosis: Age Unknown     Mother  Still living? Yes, Estimated age: 61-70  FHx: coronary artery disease, Age at diagnosis: Age Unknown  Family history of hypertension, Age at diagnosis: Age Unknown  Family history of type 2 diabetes mellitus, Age at diagnosis: Age Unknown  Family history of psoriatic arthritis, Age at diagnosis: Age Unknown  Family history of Sjogren's disease, Age at diagnosis: Age Unknown  FHx: rheumatoid arthritis, Age at diagnosis: Age Unknown

## 2021-07-31 NOTE — ED PROVIDER NOTE - PHYSICAL EXAMINATION
*GEN: No acute distress, well appearing   *HEAD: Normocephalic, Atraumatic  *EYES/NOSE: b/l Pupils symmetric & Reactive to ligth, EOMI b/l  *THROAT: airway patent, moist mucous membranes  *NECK: Neck supple  *PULMONARY: No Respiratory distress, symmetric b/l chest rise  *CARDIAC: s1s2, regular rhythm   *ABDOMEN:  Tender epigastric & ruq, Non Distended, soft, no guarding, no rebound, no masses   *BACK: no CVA tenderness, No midline vertebral tenderness to palpation   *EXTREMITIES: symmetric pulses, 2+ DP & radial pulses, no cyanosis, no edema   *SKIN: no rash, no bruising   *NEUROLOGIC: alert,  full active & passive ROM in all 4 extremities,   *PSYCH: appropriate concern about symptoms, pleasant

## 2021-07-31 NOTE — ED ADULT NURSE REASSESSMENT NOTE - NS ED NURSE REASSESS COMMENT FT1
pt aaox4, pt tolerated iv medications, pt c/o RUQ pain radiating to right shoulders, will reassess pain, will con't to monitor

## 2021-08-06 ENCOUNTER — APPOINTMENT (OUTPATIENT)
Dept: ORTHOPEDIC SURGERY | Facility: CLINIC | Age: 41
End: 2021-08-06

## 2021-08-06 ENCOUNTER — APPOINTMENT (OUTPATIENT)
Dept: GASTROENTEROLOGY | Facility: CLINIC | Age: 41
End: 2021-08-06
Payer: COMMERCIAL

## 2021-08-06 VITALS
HEIGHT: 63 IN | OXYGEN SATURATION: 98 % | SYSTOLIC BLOOD PRESSURE: 126 MMHG | DIASTOLIC BLOOD PRESSURE: 80 MMHG | BODY MASS INDEX: 46.78 KG/M2 | HEART RATE: 76 BPM | TEMPERATURE: 98 F | WEIGHT: 264 LBS

## 2021-08-06 VITALS — BODY MASS INDEX: 46.78 KG/M2 | HEIGHT: 63 IN | WEIGHT: 264 LBS

## 2021-08-06 DIAGNOSIS — Z84.0 FAMILY HISTORY OF DISEASES OF THE SKIN AND SUBCUTANEOUS TISSUE: ICD-10-CM

## 2021-08-06 DIAGNOSIS — R11.0 NAUSEA: ICD-10-CM

## 2021-08-06 DIAGNOSIS — K92.0 HEMATEMESIS: ICD-10-CM

## 2021-08-06 DIAGNOSIS — Z83.3 FAMILY HISTORY OF DIABETES MELLITUS: ICD-10-CM

## 2021-08-06 DIAGNOSIS — Z82.49 FAMILY HISTORY OF ISCHEMIC HEART DISEASE AND OTHER DISEASES OF THE CIRCULATORY SYSTEM: ICD-10-CM

## 2021-08-06 DIAGNOSIS — Z87.19 PERSONAL HISTORY OF OTHER DISEASES OF THE DIGESTIVE SYSTEM: ICD-10-CM

## 2021-08-06 DIAGNOSIS — Z83.438 FAMILY HISTORY OF OTHER DISORDER OF LIPOPROTEIN METABOLISM AND OTHER LIPIDEMIA: ICD-10-CM

## 2021-08-06 DIAGNOSIS — R10.13 EPIGASTRIC PAIN: ICD-10-CM

## 2021-08-06 DIAGNOSIS — Z80.0 FAMILY HISTORY OF MALIGNANT NEOPLASM OF DIGESTIVE ORGANS: ICD-10-CM

## 2021-08-06 PROCEDURE — 99203 OFFICE O/P NEW LOW 30 MIN: CPT

## 2021-08-06 RX ORDER — APIXABAN 5 MG/1
5 TABLET, FILM COATED ORAL
Qty: 70 | Refills: 3 | Status: DISCONTINUED | COMMUNITY
Start: 2021-05-28 | End: 2021-08-06

## 2021-08-06 NOTE — PHYSICAL EXAM
[General Appearance - Alert] : alert [General Appearance - In No Acute Distress] : in no acute distress [Sclera] : the sclera and conjunctiva were normal [PERRL With Normal Accommodation] : pupils were equal in size, round, and reactive to light [Extraocular Movements] : extraocular movements were intact [Outer Ear] : the ears and nose were normal in appearance [Oropharynx] : the oropharynx was normal [Neck Appearance] : the appearance of the neck was normal [Neck Cervical Mass (___cm)] : no neck mass was observed [Jugular Venous Distention Increased] : there was no jugular-venous distention [Thyroid Diffuse Enlargement] : the thyroid was not enlarged [Thyroid Nodule] : there were no palpable thyroid nodules [Auscultation Breath Sounds / Voice Sounds] : lungs were clear to auscultation bilaterally [Heart Rate And Rhythm] : heart rate was normal and rhythm regular [Heart Sounds] : normal S1 and S2 [Heart Sounds Gallop] : no gallops [Murmurs] : no murmurs [Heart Sounds Pericardial Friction Rub] : no pericardial rub [Edema] : there was no peripheral edema [FreeTextEntry1] : Varicose veins of leg. [Bowel Sounds] : normal bowel sounds [Abdomen Soft] : soft [Abdomen Tenderness] : non-tender [Abdomen Mass (___ Cm)] : no abdominal mass palpated [Internal Hemorrhoid] : no internal hemorrhoids [Occult Blood Positive] : stool was negative for occult blood [Cervical Lymph Nodes Enlarged Posterior Bilaterally] : posterior cervical [Cervical Lymph Nodes Enlarged Anterior Bilaterally] : anterior cervical [Supraclavicular Lymph Nodes Enlarged Bilaterally] : supraclavicular [Axillary Lymph Nodes Enlarged Bilaterally] : axillary [Femoral Lymph Nodes Enlarged Bilaterally] : femoral [Inguinal Lymph Nodes Enlarged Bilaterally] : inguinal [No CVA Tenderness] : no ~M costovertebral angle tenderness [No Spinal Tenderness] : no spinal tenderness [Abnormal Walk] : normal gait [Nail Clubbing] : no clubbing  or cyanosis of the fingernails [Musculoskeletal - Swelling] : no joint swelling seen [Motor Tone] : muscle strength and tone were normal [Skin Color & Pigmentation] : normal skin color and pigmentation [Skin Turgor] : normal skin turgor [] : no rash [Deep Tendon Reflexes (DTR)] : deep tendon reflexes were 2+ and symmetric [Sensation] : the sensory exam was normal to light touch and pinprick [No Focal Deficits] : no focal deficits [Oriented To Time, Place, And Person] : oriented to person, place, and time [Impaired Insight] : insight and judgment were intact [Affect] : the affect was normal

## 2021-08-06 NOTE — ASSESSMENT
[FreeTextEntry1] : Patient is s/p revision of sleeve gastrectomy and 11 inches small bowel exclusion.  2-month after the surgery nausea vomiting and hematemesis.  She had 2 episode of pulmonary embolism first in 2018 and second 1 June 2021.  There is no hypercoagulable state as evaluated by hematologist.  History of anxiety and asthma and obesity.  Personal history of colon polyp and a family history of colon cancer.  Patient will need both upper endoscopy and colonoscopy to evaluate the cause of hematemesis and nausea and colonoscopy for surveillance of colon polyp.  But as she recently had pulmonary embolism the risk of invasive procedure is significantly high.  I will obtain an upper GI series at this time to rule out any upper GI obstruction and check for ulcer disease.  Once patient loses weight further and she is to 3 months away from the pulmonary embolism then will reschedule for upper endoscopy and colonoscopy for further evaluation.  This management was discussed with the patient she understand and agrees.  Meanwhile continue on omeprazole 40 mg twice a day.  She will continue on low molecular weight heparin injections for history of pulmonary embolism her surgeon and hematologist at this time did not want her to go on Eliquis till she lose weight for the.

## 2021-08-06 NOTE — REASON FOR VISIT
[Initial Evaluation] : an initial evaluation [FreeTextEntry1] : Epigastric pain, nausea and vomiting 1 week ago

## 2021-08-06 NOTE — HISTORY OF PRESENT ILLNESS
[Diarrhea] : denies diarrhea [Constipation] : denies constipation [Yellow Skin Or Eyes (Jaundice)] : denies jaundice [Abdominal Swelling] : denies abdominal swelling [Rectal Pain] : denies rectal pain [Wt Loss ___ Lbs] : recent [unfilled] ~Upound(s) weight loss [Heartburn] : heartburn [Nausea] : nausea [Vomiting] : vomiting [Abdominal Pain] : abdominal pain [Abdominal Surgery] : abdominal surgery [Wt Gain ___ Lbs] : no recent weight gain [GERD] : no gastroesophageal reflux disease [Hiatus Hernia] : no hiatus hernia [Peptic Ulcer Disease] : no peptic ulcer disease [Pancreatitis] : no pancreatitis [Cholelithiasis] : no cholelithiasis [Kidney Stone] : no kidney stone [Inflammatory Bowel Disease] : no inflammatory bowel disease [Irritable Bowel Syndrome] : no irritable bowel syndrome [Diverticulitis] : no diverticulitis [Alcohol Abuse] : no alcohol abuse [Malignancy] : no malignancy [Appendectomy] : no appendectomy [Cholecystectomy] : no cholecystectomy [de-identified] : In May 2021 she had revision of gastric sleeve surgery with bypass of 11 inches of small bowel.  Postoperatively in June 2021 she developed saddle pulmonary embolism was hospitalized at [facility] receiving Lovenox.  Since discharge she still continues to be on Lovenox.  About a week ago she started having epigastric pain with nausea then vomited 3 times she did contain blood bright red in color.  No melena or change in bowel habit.  Went to the emergency room at [facility] where she had a CAT scan of the abdomen done which was normal did not show any pancreatitis or cholelithiasis.  Also had a sonogram of the abdomen and again no cholelithiasis was seen.  There was no evidence of bowel obstruction on the CAT scan.  Patient was put on omeprazole 40 mg twice a day and discharged home advised to have upper endoscopy done as an outpatient.  Patient is here for follow-up.  She also gives history of colon polyps in the past and needs surveillance colonoscopy. [de-identified] : In December 2012 she had gastric sleeve surgery. [FreeTextEntry1] : Patient has past history of anxiety on alprazolam 0.5 mg half to 1 tablet as needed, asthma, benign paroxysmal positional vertigo take Zofran as needed gastroesophageal reflux disease taking omeprazole in the past in 2018 patient has bilateral pulmonary embolism spontaneously without any DVT of the leg she was worked up for hypercoagulable state, there was no hypercoagulable state found.  She was found to have thrombocytosis at 1 time patient was on Eliquis for 1 year after that it was stopped she denies history of hypertension, diabetes mellitus, MI, angina, CHF, TIA, CVA.  History of obesity for which she initially had sleeve surgery in 2012 and then revision of the sleeve surgery in 2021 May with bypass of the small bowel 11 inches.  She states when she was in the hospital her hematocrit was 30.  Did not receive any transfusion.  There is a prior history of iron deficiency anemia bilateral carpal tunnel syndrome deviated nasal septum with chronic rhinitis

## 2021-08-06 NOTE — REVIEW OF SYSTEMS
[Abdominal Pain] : abdominal pain [Vomiting] : vomiting [Constipation] : no constipation [Diarrhea] : no diarrhea [Heartburn] : heartburn [Melena] : no melena [Negative] : Heme/Lymph

## 2021-08-10 LAB
25(OH)D3 SERPL-MCNC: 23.2 NG/ML
ALBUMIN SERPL ELPH-MCNC: 3.5 G/DL
ALP BLD-CCNC: 106 U/L
ALT SERPL-CCNC: 20 U/L
ANION GAP SERPL CALC-SCNC: 11 MMOL/L
APPEARANCE: CLEAR
AST SERPL-CCNC: 20 U/L
BACTERIA: NEGATIVE
BASOPHILS # BLD AUTO: 0.03 K/UL
BASOPHILS NFR BLD AUTO: 0.5 %
BILIRUB DIRECT SERPL-MCNC: 0.2 MG/DL
BILIRUB INDIRECT SERPL-MCNC: 0.3 MG/DL
BILIRUB SERPL-MCNC: 0.6 MG/DL
BILIRUBIN URINE: NEGATIVE
BLOOD URINE: NEGATIVE
BUN SERPL-MCNC: 8 MG/DL
CALCIUM SERPL-MCNC: 9.1 MG/DL
CHLORIDE SERPL-SCNC: 103 MMOL/L
CHOLEST SERPL-MCNC: 141 MG/DL
CO2 SERPL-SCNC: 27 MMOL/L
COLOR: YELLOW
CREAT SERPL-MCNC: 0.56 MG/DL
EOSINOPHIL # BLD AUTO: 0.16 K/UL
EOSINOPHIL NFR BLD AUTO: 2.7 %
ESTIMATED AVERAGE GLUCOSE: 103 MG/DL
FERRITIN SERPL-MCNC: 24 NG/ML
FOLATE SERPL-MCNC: 3 NG/ML
GLUCOSE QUALITATIVE U: NEGATIVE
GLUCOSE SERPL-MCNC: 76 MG/DL
HBA1C MFR BLD HPLC: 5.2 %
HBV CORE IGG+IGM SER QL: NONREACTIVE
HBV CORE IGM SER QL: NONREACTIVE
HBV SURFACE AB SER QL: ABNORMAL
HBV SURFACE AG SER QL: NONREACTIVE
HCT VFR BLD CALC: 36.4 %
HCV AB SER QL: NONREACTIVE
HCV S/CO RATIO: 0.11 S/CO
HDLC SERPL-MCNC: 49 MG/DL
HGB BLD-MCNC: 11 G/DL
HYALINE CASTS: 3 /LPF
IMM GRANULOCYTES NFR BLD AUTO: 0.2 %
IRON SATN MFR SERPL: 11 %
IRON SERPL-MCNC: 34 UG/DL
KETONES URINE: NEGATIVE
LDLC SERPL CALC-MCNC: 75 MG/DL
LEUKOCYTE ESTERASE URINE: NEGATIVE
LYMPHOCYTES # BLD AUTO: 2 K/UL
LYMPHOCYTES NFR BLD AUTO: 33.3 %
MAN DIFF?: NORMAL
MCHC RBC-ENTMCNC: 26.3 PG
MCHC RBC-ENTMCNC: 30.2 GM/DL
MCV RBC AUTO: 87.1 FL
MEV IGG FLD QL IA: 65.4 AU/ML
MEV IGG+IGM SER-IMP: POSITIVE
MICROSCOPIC-UA: NORMAL
MONOCYTES # BLD AUTO: 0.47 K/UL
MONOCYTES NFR BLD AUTO: 7.8 %
MUV AB SER-ACNC: POSITIVE
MUV IGG SER QL IA: 57.2 AU/ML
NEUTROPHILS # BLD AUTO: 3.34 K/UL
NEUTROPHILS NFR BLD AUTO: 55.5 %
NITRITE URINE: NEGATIVE
NONHDLC SERPL-MCNC: 92 MG/DL
PH URINE: 6
PLATELET # BLD AUTO: 397 K/UL
POTASSIUM SERPL-SCNC: 4.1 MMOL/L
PROT SERPL-MCNC: 5.9 G/DL
PROTEIN URINE: NORMAL
RBC # BLD: 4.18 M/UL
RBC # FLD: 19.9 %
RED BLOOD CELLS URINE: 11 /HPF
RUBV IGG FLD-ACNC: 3.7 INDEX
RUBV IGG SER-IMP: POSITIVE
SODIUM SERPL-SCNC: 141 MMOL/L
SPECIFIC GRAVITY URINE: 1.02
SQUAMOUS EPITHELIAL CELLS: 2 /HPF
TIBC SERPL-MCNC: 310 UG/DL
TRIGL SERPL-MCNC: 87 MG/DL
TSH SERPL-ACNC: 1.87 UIU/ML
UIBC SERPL-MCNC: 276 UG/DL
UROBILINOGEN URINE: ABNORMAL
VIT B12 SERPL-MCNC: 929 PG/ML
VZV AB TITR SER: POSITIVE
VZV IGG SER IF-ACNC: 1633 INDEX
WBC # FLD AUTO: 6.01 K/UL
WHITE BLOOD CELLS URINE: 1 /HPF

## 2021-08-11 ENCOUNTER — NON-APPOINTMENT (OUTPATIENT)
Age: 41
End: 2021-08-11

## 2021-08-11 ENCOUNTER — APPOINTMENT (OUTPATIENT)
Dept: INTERNAL MEDICINE | Facility: CLINIC | Age: 41
End: 2021-08-11
Payer: COMMERCIAL

## 2021-08-11 ENCOUNTER — APPOINTMENT (OUTPATIENT)
Dept: PULMONOLOGY | Facility: CLINIC | Age: 41
End: 2021-08-11
Payer: COMMERCIAL

## 2021-08-11 VITALS
OXYGEN SATURATION: 98 % | DIASTOLIC BLOOD PRESSURE: 82 MMHG | RESPIRATION RATE: 16 BRPM | HEART RATE: 84 BPM | WEIGHT: 262 LBS | BODY MASS INDEX: 46.42 KG/M2 | TEMPERATURE: 97.3 F | SYSTOLIC BLOOD PRESSURE: 138 MMHG | HEIGHT: 63 IN

## 2021-08-11 VITALS
WEIGHT: 262 LBS | SYSTOLIC BLOOD PRESSURE: 119 MMHG | DIASTOLIC BLOOD PRESSURE: 87 MMHG | OXYGEN SATURATION: 97 % | HEART RATE: 95 BPM | HEIGHT: 63 IN | BODY MASS INDEX: 46.42 KG/M2

## 2021-08-11 DIAGNOSIS — Z23 ENCOUNTER FOR IMMUNIZATION: ICD-10-CM

## 2021-08-11 DIAGNOSIS — Z86.711 PERSONAL HISTORY OF PULMONARY EMBOLISM: ICD-10-CM

## 2021-08-11 DIAGNOSIS — M50.20 OTHER CERVICAL DISC DISPLACEMENT, UNSPECIFIED CERVICAL REGION: ICD-10-CM

## 2021-08-11 PROCEDURE — 93000 ELECTROCARDIOGRAM COMPLETE: CPT

## 2021-08-11 PROCEDURE — 99396 PREV VISIT EST AGE 40-64: CPT | Mod: 25

## 2021-08-11 PROCEDURE — 99214 OFFICE O/P EST MOD 30 MIN: CPT

## 2021-08-11 NOTE — ASSESSMENT
[FreeTextEntry1] : 38 year old female Hx bilateral pulmonary emboli, s/p bariatric surgery complicated by saddle pulmonary embolus now on Lovenox, presents for follow up\par  \par Hematology follow up ASAP, patient encouraged to contact Hematologist ASAP\par Continue Lovenox per Hematology\par Begin Arnuity Ellipta 100 mcg daily\par Rescue 2 puffs only if needed \par \par Follow up pending Hematology follow up\par

## 2021-08-11 NOTE — HISTORY OF PRESENT ILLNESS
[Never] : never [TextBox_4] : Patient is a 40 year old female Hx bilateral pulmonary emboli 11/2018, unprovoked, recent bariatric surgery complicated by saddle pulmonary embolism, ICU hospitalization x 2 days, now on Lovenox 150 mg BID, presents for follow up.  Patient has been losing weight and following with Hematology for Lovenox adjustments.  She is anticipating transitioning to oral anticoagulant when she loses more weight. She has been experiencing some bleeding per rectum and nausea which resulted in vomiting of blood.  She is here for follow up.  She reports breathing is improving

## 2021-08-12 PROBLEM — Z86.711 HISTORY OF PULMONARY EMBOLISM: Status: RESOLVED | Noted: 2021-06-10 | Resolved: 2021-08-06

## 2021-08-12 PROBLEM — M50.20 CERVICAL DISC HERNIATION: Status: ACTIVE | Noted: 2020-09-30

## 2021-08-12 NOTE — ASSESSMENT
[FreeTextEntry1] : 40 female  h/o asthma w/ multiple prior intubations, idiopathic PE in 2018 (on Xarelto till 1/2020 - unclear prior APLS diagnosis), morbid obesity s/p lap sleeve, gastrectomy in 2012 w/ recent revision on 5/14/2021 who p/w saddle PE w/ RV strain, currently on lovenox BID.  \par labs reviewed with patient, overall stable \par \par Saddle PE w/ RV strain following bariatric surgery \par -fu with cardio for repeat  echo\par -cont lovenox as per hem (needs dose correction with recent weight loss)\par -monitor for bleeding \par \par Emesisx2 on lovenox -resolved \par -cont PPI BID\par -will check cbc \par -f/u as per GI \par \par Obesity s/p sleeve gastrectomy by Dr. Kay.  noted weight loss \par -encouraged to continue with healthy diet and exercise \par \par Annual \par -Advised to get yearly Flu shot \par -Advised Yearly Eye exam with Ophthalmologist\par -Advised Yearly Dental exam\par -Educated of the importance of Healthy diet, such as Mediterranean Diet and Exercise, such as walking >20 minutes a day and increasing gradually as tolerated\par \par Preventative screening \par -advised to get mammogram for breast cancer screening -UTD\par -advised to get PAP for cervical cancer screening -UTD\par \par -covid- UTD\par \par \par \par \par \par \par \par \par \par \par \par \par \par \par

## 2021-08-12 NOTE — HISTORY OF PRESENT ILLNESS
[de-identified] : 41 y/o female with h/o asthma w/ multiple prior intubations, idiopathic PE in 2018 (on Xarelto till 1/2020 - unclear prior APLS diagnosis), morbid obesity s/p lap sleeve, gastrectomy in 2012 w/ recent revision on 5/14/2021 who p/w saddle PE w/ RV strain, currently on lovenox BID presents for annual.  \par \par states 10 days ago had episode of emesisx2, was seen in [facility] and labs, imaging were stable.  seen by GI but christianoien on Lovenox for acute PE and resolution of symptoms would hold off on EGD at the moment.  \par Given recent weight loss will likely need to adjust Lovenox dosing.  States advised to follow-up with her hematologist, Dr. Monahan\par No further episodes at the moment.\par \par \par \par \par \par \par

## 2021-08-12 NOTE — HEALTH RISK ASSESSMENT
[Patient reported mammogram was normal] : Patient reported mammogram was normal [Patient reported PAP Smear was normal] : Patient reported PAP Smear was normal [Change in mental status noted] : No change in mental status noted [MammogramDate] : 7/2020 [PapSmearDate] : 7/2020

## 2021-08-12 NOTE — PHYSICAL EXAM
[No CVA Tenderness] : no CVA  tenderness [No Spinal Tenderness] : no spinal tenderness [No Joint Swelling] : no joint swelling [No Rash] : no rash [No Focal Deficits] : no focal deficits [Normal] : normal gait, coordination grossly intact, no focal deficits and deep tendon reflexes were 2+ and symmetric [Normal Affect] : the affect was normal [Normal Mood] : the mood was normal

## 2021-08-18 ENCOUNTER — APPOINTMENT (OUTPATIENT)
Dept: CARDIOLOGY | Facility: CLINIC | Age: 41
End: 2021-08-18

## 2021-09-02 NOTE — PROGRESS NOTE ADULT - SUBJECTIVE AND OBJECTIVE BOX
Patient is a 38y old  Female who presents with a chief complaint of shortness of breath (31 Oct 2018 11:56)      SUBJECTIVE / OVERNIGHT EVENTS: overnight events noted + pleuritic chest pain   shortness of breath improved      ROS:  Resp: No cough no sputum production  CVS: + chest pain no palpitations no orthopnea  GI: no N/V/D  : no dysuria, no hematuria  Neuro: no weakness no paresthesias  Heme: No petechiae no easy bruising  Msk: No joint pain no swelling  Skin: No rash no itching        MEDICATIONS  (STANDING):  heparin  Infusion.  Unit(s)/Hr (24 mL/Hr) IV Continuous <Continuous>  sodium chloride 0.9% lock flush 3 milliLiter(s) IV Push every 8 hours  tiotropium 18 MICROgram(s) Capsule 1 Capsule(s) Inhalation daily    MEDICATIONS  (PRN):  ALBUTerol    90 MICROgram(s) HFA Inhaler 2 Puff(s) Inhalation every 6 hours PRN Shortness of Breath and/or Wheezing  heparin  Injectable 75882 Unit(s) IV Push every 6 hours PRN For aPTT less than 40  heparin  Injectable 5000 Unit(s) IV Push every 6 hours PRN For aPTT between 40 - 57  ketorolac   Injectable 30 milliGRAM(s) IV Push every 8 hours PRN Moderate Pain (4 - 6)        CAPILLARY BLOOD GLUCOSE        I&O's Summary    30 Oct 2018 07:01  -  31 Oct 2018 07:00  --------------------------------------------------------  IN: 693 mL / OUT: 0 mL / NET: 693 mL        Vital Signs Last 24 Hrs  T(C): 36.8 (31 Oct 2018 13:18), Max: 37.1 (30 Oct 2018 16:25)  T(F): 98.3 (31 Oct 2018 13:18), Max: 98.7 (30 Oct 2018 16:25)  HR: 104 (31 Oct 2018 13:18) (90 - 104)  BP: 136/75 (31 Oct 2018 13:18) (117/83 - 136/75)  BP(mean): --  RR: 18 (31 Oct 2018 13:18) (18 - 22)  SpO2: 94% (31 Oct 2018 13:18) (93% - 99%)    PHYSICAL EXAM: vital signs as above  in no apparent distress obese   HEENT: CRISTI EOMI  Neck: Supple, no JVD, no thyromegaly  Lungs: no rhonchi, no wheeze, no crackles  CVS: S1 S2 no M/R/G  Abdomen: no tenderness, no organomegaly, BS present  Neuro: AO x 3 no focal weakness, no sensory abnormalities  Psych: appropriate affect  Skin: warm, dry  Ext: no cyanosis or clubbing, no edema  Msk: no joint swelling or deformities  Back: no CVA tenderness, no kyphosis/scoliosis    LABS:                        12.0   17.3  )-----------( 389      ( 31 Oct 2018 05:23 )             38.7     10-29    139  |  102  |  17  ----------------------------<  110<H>  4.2   |  22  |  0.75    Ca    9.4      29 Oct 2018 23:10    TPro  8.0  /  Alb  4.3  /  TBili  0.3  /  DBili  x   /  AST  16  /  ALT  15  /  AlkPhos  108  10-29    PT/INR - ( 30 Oct 2018 11:20 )   PT: 13.0 sec;   INR: 1.20 ratio         PTT - ( 31 Oct 2018 08:52 )  PTT:73.9 sec  CARDIAC MARKERS ( 31 Oct 2018 05:23 )  x     / x     / 50 U/L / x     / 2.7 ng/mL            All consultant(s) notes reviewed and care discussed with other providers    Contact Number, Dr Meehan [phone] right ij uldall

## 2021-09-09 ENCOUNTER — OUTPATIENT (OUTPATIENT)
Dept: OUTPATIENT SERVICES | Facility: HOSPITAL | Age: 41
LOS: 1 days | Discharge: HOME | End: 2021-09-09

## 2021-09-09 DIAGNOSIS — D68.2 HEREDITARY DEFICIENCY OF OTHER CLOTTING FACTORS: ICD-10-CM

## 2021-09-09 DIAGNOSIS — Z87.19 PERSONAL HISTORY OF OTHER DISEASES OF THE DIGESTIVE SYSTEM: Chronic | ICD-10-CM

## 2021-09-09 DIAGNOSIS — Z98.84 BARIATRIC SURGERY STATUS: Chronic | ICD-10-CM

## 2021-09-09 DIAGNOSIS — Z98.890 OTHER SPECIFIED POSTPROCEDURAL STATES: Chronic | ICD-10-CM

## 2021-09-10 ENCOUNTER — APPOINTMENT (OUTPATIENT)
Dept: PULMONOLOGY | Facility: CLINIC | Age: 41
End: 2021-09-10

## 2021-09-10 ENCOUNTER — APPOINTMENT (OUTPATIENT)
Dept: HEMATOLOGY ONCOLOGY | Facility: CLINIC | Age: 41
End: 2021-09-10

## 2021-09-13 ENCOUNTER — NON-APPOINTMENT (OUTPATIENT)
Age: 41
End: 2021-09-13

## 2021-09-13 NOTE — ED PROVIDER NOTE - CPE EDP ENMT NORM
Impella removal    Indication: resolution of cardiogenic shock, pt/family wishes    Procedure:    R/b/a/o d/w pt/family, they understand/agree. Impella placed to P0, issac back to left groin and removed with sheath and manual pressure applied until hemostasis obtained. Distal pulses remain dopplered as prior to removal.  No immediate complications, EBL <10DF.     Conclusions:    Successful Impella perc LVAD removal normal...

## 2021-10-06 ENCOUNTER — APPOINTMENT (OUTPATIENT)
Dept: GASTROENTEROLOGY | Facility: CLINIC | Age: 41
End: 2021-10-06

## 2021-11-05 NOTE — PROVIDER CONTACT NOTE (CRITICAL VALUE NOTIFICATION) - ASSESSMENT
Vital Signs Last 24 Hrs  T(C): 35.8 (11-05-21 @ 06:20), Max: 36.4 (11-04-21 @ 14:22)  T(F): 96.5 (11-05-21 @ 06:20), Max: 97.6 (11-04-21 @ 14:22)  HR: --  BP: --  BP(mean): --  RR: --  SpO2: --    Orthostatic VS  11-04-21 @ 19:47  Lying BP: --/-- HR: --  Sitting BP: 133/88 HR: 95  Standing BP: 134/82 HR: 96  Site: --  Mode: --  Orthostatic VS  11-04-21 @ 08:29  Lying BP: --/-- HR: --  Sitting BP: 112/60 HR: --  Standing BP: 107/53 HR: --  Site: --  Mode: electronic  Orthostatic VS  11-03-21 @ 19:36  Lying BP: --/-- HR: --  Sitting BP: 117/65 HR: 83  Standing BP: 120/73 HR: 95  Site: --  Mode: --   pt is a+ox4, pt in no pain or distress, denies signs of bleeding, no s.s of bleeding noted Vital Signs Last 24 Hrs  T(C): 35.8 (11-05-21 @ 06:20), Max: 36.4 (11-04-21 @ 14:22)  T(F): 96.5 (11-05-21 @ 06:20), Max: 97.6 (11-04-21 @ 14:22)  HR: 88 (11-05-21 @ 10:27) (88 - 88)    Orthostatic VS  11-05-21 @ 06:20  Lying BP: --/-- HR: --  Sitting BP: 108/85 HR: 122  Standing BP: 108/68 HR: 131  Site: --  Mode: --  Orthostatic VS  11-04-21 @ 19:47  Lying BP: --/-- HR: --  Sitting BP: 133/88 HR: 95  Standing BP: 134/82 HR: 96  Site: --  Mode: --  Orthostatic VS  11-04-21 @ 08:29  Lying BP: --/-- HR: --  Sitting BP: 112/60 HR: --  Standing BP: 107/53 HR: --  Site: --  Mode: electronic  Orthostatic VS  11-03-21 @ 19:36  Lying BP: --/-- HR: --  Sitting BP: 117/65 HR: 83  Standing BP: 120/73 HR: 95  Site: --  Mode: --   Vital Signs Last 24 Hrs  T(C): 36.9 (11-05-21 @ 14:41), Max: 36.9 (11-05-21 @ 14:41)  T(F): 98.5 (11-05-21 @ 14:41), Max: 98.5 (11-05-21 @ 14:41)  HR: 88 (11-05-21 @ 10:27) (88 - 88)    Orthostatic VS  11-05-21 @ 06:20  Lying BP: --/-- HR: --  Sitting BP: 108/85 HR: 122  Standing BP: 108/68 HR: 131  Site: --  Mode: --  Orthostatic VS  11-04-21 @ 19:47  Lying BP: --/-- HR: --  Sitting BP: 133/88 HR: 95  Standing BP: 134/82 HR: 96  Site: --  Mode: --  Orthostatic VS  11-04-21 @ 08:29  Lying BP: --/-- HR: --  Sitting BP: 112/60 HR: --  Standing BP: 107/53 HR: --  Site: --  Mode: electronic  Orthostatic VS  11-03-21 @ 19:36  Lying BP: --/-- HR: --  Sitting BP: 117/65 HR: 83  Standing BP: 120/73 HR: 95  Site: --  Mode: --

## 2021-11-18 ENCOUNTER — APPOINTMENT (OUTPATIENT)
Dept: INTERNAL MEDICINE | Facility: CLINIC | Age: 41
End: 2021-11-18
Payer: COMMERCIAL

## 2021-11-18 DIAGNOSIS — R00.2 PALPITATIONS: ICD-10-CM

## 2021-11-18 PROCEDURE — 99213 OFFICE O/P EST LOW 20 MIN: CPT | Mod: 25,95

## 2021-11-18 NOTE — HISTORY OF PRESENT ILLNESS
[Home] : at home, [unfilled] , at the time of the visit. [Medical Office: ([facility])___] : at the medical office located in  [Verbal consent obtained from patient] : the patient, [unfilled] [de-identified] : 41 y/o female with h/o asthma w/ multiple prior intubations, idiopathic PE in 2018 (on Xarelto till 1/2020 - unclear prior APLS diagnosis), morbid obesity s/p lap sleeve, gastrectomy in 2012 w/ recent revision on 5/14/2021 who p/w saddle PE w/ RV strain, currently on lovenox BID presents for follow-up.\par \par Was back to work this week and states having decreased exercise tolerance, unable to perform her duties due to her normal standards.\par Will be following up with hematology\par Neck several weeks due to saddle PE, still taking Lovenox injections which she states can be contributing to her fatigue.\par \par \par \par \par \par

## 2021-11-18 NOTE — ASSESSMENT
[FreeTextEntry1] : 40 female  h/o asthma w/ multiple prior intubations, idiopathic PE in 2018 (on Xarelto till 1/2020 - unclear prior APLS diagnosis), morbid obesity s/p lap sleeve, gastrectomy in 2012 w/ recent revision on 5/14/2021 who p/w saddle PE w/ RV strain, currently on lovenox BID.  \par labs reviewed with patient, overall stable \par \par Was back to work this week and states having decreased exercise tolerance, unable to perform her duties due to her normal standards.\par -We will keep patient out for several weeks to help increase functionality\par Will be following up with hematology\par \par Saddle PE w/ RV strain following bariatric surgery \par -fu with cardio for repeat  echo\par -cont lovenox as per hem \par -Advised to follow-up as has not been seen in several months\par -monitor for bleeding \par \par Emesisx2 on lovenox -resolved \par -cont PPI BID\par -will check cbc \par -f/u as per GI \par \par Obesity s/p sleeve gastrectomy by Dr. Kay.  noted weight loss \par -encouraged to continue with healthy diet and exercise \par \par \par \par \par \par \par \par \par \par \par \par \par \par

## 2021-11-19 ENCOUNTER — TRANSCRIPTION ENCOUNTER (OUTPATIENT)
Age: 41
End: 2021-11-19

## 2022-01-09 NOTE — ED CDU PROVIDER DISPOSITION NOTE - CADM POA URETHRAL CATHETER
[facility] [address]  [address] [address]  Dept: [phone]    January 9, 2022    Patient: Jacqueline Ramirez  YOB: 2002    Jacqueline Ramirez was seen and evaluated at our [facility]  Please note if Covid and Flu tests are negative, they may return to work when fever free for 24 hours without the use of a fever reducing agent  If Covid or Flu test is positive, they may return to work on 01/14/22, as this is 5 days from the onset of symptoms  Upon return, they must then adhere to strict masking for an additional 5 days      Sincerely,    Brock Day RN
No

## 2022-01-24 ENCOUNTER — NON-APPOINTMENT (OUTPATIENT)
Age: 42
End: 2022-01-24

## 2022-03-15 NOTE — DISCHARGE NOTE ADULT - NURSING SECTION COMPLETE
ANTICOAGULATION MANAGEMENT     Beth CRANDALL Juanjo 55 Cheng Resendiz,  Referring Family Medicine [phone]             Patient/Caregiver provided printed discharge information.

## 2022-07-23 NOTE — ED ADULT NURSE NOTE - CAS ELECT INFOMATION PROVIDED
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  4  Suspected COPD of unknown severity without acute exacerbation  5  Mild CHANTALE    Plan:  Chest tube initially placed on 07/20, was upsized yesterday  Chest x-ray this morning shows resolution of the pneumothorax  Will place chest tube to water seal and repeat chest x-ray tomorrow morning  Patient advised to let us know if any worsening shortness of breath or pain  Pain currently better controlled with nerve block  Continue albuterol as needed, no indication for steroids  Follows with Dr Farrah Clemons for CHANTALE, continue to hold BiPAP therapy  Will continue to follow    Subjective:   Patient resting in bed, she is still having some pain but improved  Objective:     Vitals: Blood pressure 141/79, pulse 69, temperature 99 °F (37 2 °C), resp  rate 18, weight 78 kg (172 lb), SpO2 93 %  ,Body mass index is 25 4 kg/m²        Intake/Output Summary (Last 24 hours) at 7/23/2022 1217  Last data filed at 7/23/2022 0700  Gross per 24 hour   Intake --   Output 880 ml   Net -880 ml       Invasive Devices  Report    Peripheral Intravenous Line  Duration           Peripheral IV 07/20/22 Right Antecubital 3 days          Drain  Duration           Chest Tube 1 Right Other (Comment) 14 Fr  1 day          Airway  Duration           Surgical Airway Shiley Fenestrated 143 days                Physical Exam: /79   Pulse 69   Temp 99 °F (37 2 °C)   Resp 18   Wt 78 kg (172 lb)   SpO2 93%   BMI 25 40 kg/m²   General appearance: alert and oriented, in no acute distress  Head: Normocephalic, without obvious abnormality, atraumatic  Eyes: negative findings: conjunctivae and sclerae normal  Lungs: diminished breath sounds and chest tube without air leak  Heart: regular rate and rhythm  Abdomen: normal findings: soft, non-tender  Extremities: No edema  Skin: Warm and dry  Neurologic: Mental status: Alert, oriented, thought content appropriate     Labs: I have personally reviewed pertinent lab results  , CBC:   Lab Results   Component Value Date    WBC 21 72 (H) 07/23/2022    HGB 9 4 (L) 07/23/2022    HCT 31 0 (L) 07/23/2022    MCV 78 (L) 07/23/2022     (H) 07/23/2022    MCH 23 6 (L) 07/23/2022    MCHC 30 3 (L) 07/23/2022    RDW 17 0 (H) 07/23/2022    MPV 9 6 07/23/2022    NRBC 0 07/23/2022   , CMP:   Lab Results   Component Value Date    SODIUM 135 07/23/2022    K 3 7 07/23/2022    CL 96 07/23/2022    CO2 31 07/23/2022    BUN 38 (H) 07/23/2022    CREATININE 1 15 07/23/2022    CALCIUM 8 8 07/23/2022    EGFR 53 07/23/2022     Imaging and other studies: I have personally reviewed pertinent reports     and I have personally reviewed pertinent films in PACS DC instructions

## 2022-09-06 ENCOUNTER — NON-APPOINTMENT (OUTPATIENT)
Age: 42
End: 2022-09-06

## 2022-09-06 ENCOUNTER — APPOINTMENT (OUTPATIENT)
Dept: CARDIOLOGY | Facility: CLINIC | Age: 42
End: 2022-09-06

## 2022-09-06 ENCOUNTER — APPOINTMENT (OUTPATIENT)
Dept: INTERNAL MEDICINE | Facility: CLINIC | Age: 42
End: 2022-09-06

## 2022-09-06 VITALS
OXYGEN SATURATION: 99 % | HEIGHT: 63 IN | BODY MASS INDEX: 32.07 KG/M2 | SYSTOLIC BLOOD PRESSURE: 117 MMHG | HEART RATE: 54 BPM | WEIGHT: 181 LBS | DIASTOLIC BLOOD PRESSURE: 86 MMHG

## 2022-09-06 VITALS
BODY MASS INDEX: 32.07 KG/M2 | SYSTOLIC BLOOD PRESSURE: 116 MMHG | DIASTOLIC BLOOD PRESSURE: 80 MMHG | HEIGHT: 63 IN | WEIGHT: 181 LBS

## 2022-09-06 DIAGNOSIS — I51.7 CARDIOMEGALY: ICD-10-CM

## 2022-09-06 DIAGNOSIS — Z86.711 PERSONAL HISTORY OF PULMONARY EMBOLISM: ICD-10-CM

## 2022-09-06 DIAGNOSIS — Z86.2 PERSONAL HISTORY OF DISEASES OF THE BLOOD AND BLOOD-FORMING ORGANS AND CERTAIN DISORDERS INVOLVING THE IMMUNE MECHANISM: ICD-10-CM

## 2022-09-06 DIAGNOSIS — I26.99 OTHER PULMONARY EMBOLISM W/OUT ACUTE COR PULMONALE: ICD-10-CM

## 2022-09-06 PROCEDURE — 90686 IIV4 VACC NO PRSV 0.5 ML IM: CPT

## 2022-09-06 PROCEDURE — G0008: CPT

## 2022-09-06 PROCEDURE — 93000 ELECTROCARDIOGRAM COMPLETE: CPT | Mod: 59

## 2022-09-06 PROCEDURE — 99213 OFFICE O/P EST LOW 20 MIN: CPT | Mod: 25

## 2022-09-06 PROCEDURE — 99396 PREV VISIT EST AGE 40-64: CPT | Mod: 25

## 2022-09-06 PROCEDURE — 36415 COLL VENOUS BLD VENIPUNCTURE: CPT

## 2022-09-06 PROCEDURE — 93000 ELECTROCARDIOGRAM COMPLETE: CPT

## 2022-09-06 RX ORDER — ALBUTEROL SULFATE 90 UG/1
108 (90 BASE) AEROSOL, METERED RESPIRATORY (INHALATION)
Qty: 1 | Refills: 3 | Status: ACTIVE | COMMUNITY
Start: 2020-09-30 | End: 1900-01-01

## 2022-09-06 RX ORDER — ASPIRIN 81 MG/1
81 TABLET ORAL
Qty: 60 | Refills: 3 | Status: COMPLETED | COMMUNITY
Start: 2020-06-25 | End: 2022-09-06

## 2022-09-06 RX ORDER — FOLIC ACID 1 MG/1
1 TABLET ORAL DAILY
Qty: 30 | Refills: 3 | Status: DISCONTINUED | COMMUNITY
Start: 2021-08-13 | End: 2022-09-06

## 2022-09-06 RX ORDER — TRIAMCINOLONE ACETONIDE 5 MG/G
0.5 CREAM TOPICAL 3 TIMES DAILY
Qty: 1 | Refills: 1 | Status: DISCONTINUED | COMMUNITY
Start: 2021-05-05 | End: 2022-09-06

## 2022-09-06 RX ORDER — ERGOCALCIFEROL 1.25 MG/1
1.25 MG CAPSULE, LIQUID FILLED ORAL
Qty: 12 | Refills: 3 | Status: DISCONTINUED | COMMUNITY
Start: 2019-12-27 | End: 2022-09-06

## 2022-09-06 RX ORDER — ENOXAPARIN SODIUM 150 MG/ML
150 INJECTION SUBCUTANEOUS
Qty: 18 | Refills: 0 | Status: DISCONTINUED | COMMUNITY
Start: 2021-06-10 | End: 2022-09-06

## 2022-09-06 RX ORDER — FLUTICASONE FUROATE 100 UG/1
100 POWDER RESPIRATORY (INHALATION) DAILY
Qty: 1 | Refills: 3 | Status: DISCONTINUED | COMMUNITY
Start: 2021-04-21 | End: 2022-09-06

## 2022-09-06 NOTE — DISCUSSION/SUMMARY
[FreeTextEntry1] : Advised follow up with hematology re chronic long term prophylaxis.\par Sched echo to re-eval RV.\par

## 2022-09-06 NOTE — HISTORY OF PRESENT ILLNESS
[FreeTextEntry1] : ARTURO MERCER is a 42 year old female  presents for cardio re-eval.  She has history of recurrent PE with right heart strain.  First in 2020 associated with morbid obesity and relative immobility.  She was treated with 1 year of anticoagulation, hypercoag work up negative.  Then while recovering from bariatric surgery she had recurrent biliat PE, echo done in hospital showed dilated right ventricle.  She was treated with another year of anticoagulation and again hypercoag work up negative, Eliquis discontinued about 2 mo ago.\par She feels well, no chest pain, SOB, palps, dizziness, lower ext edema or pain.\par

## 2022-09-06 NOTE — HEALTH RISK ASSESSMENT
[Good] : ~his/her~  mood as  good [Sexually Active] : sexually active [Fully functional (bathing, dressing, toileting, transferring, walking, feeding)] : Fully functional (bathing, dressing, toileting, transferring, walking, feeding) [Fully functional (using the telephone, shopping, preparing meals, housekeeping, doing laundry, using] : Fully functional and needs no help or supervision to perform IADLs (using the telephone, shopping, preparing meals, housekeeping, doing laundry, using transportation, managing medications and managing finances) [Change in mental status noted] : No change in mental status noted [High Risk Behavior] : no high risk behavior

## 2022-09-06 NOTE — HISTORY OF PRESENT ILLNESS
[de-identified] : 41 y/o female with h/o asthma w/ multiple prior intubations, idiopathic PE in 2018 (on Xarelto till 1/2020 - unclear prior APLS diagnosis), morbid obesity s/p lap sleeve, gastrectomy in 2012 w/ recent revision on 5/14/2021 presents for annual exam.  \par \par has appt with fertility specialist upcoming.\par \par having back pain, has appt with spine specialist upcoming \par \par no acute complaints \par \par doing well following bariatric surgery \par \par \par \par \par \par \par \par

## 2022-09-06 NOTE — ASSESSMENT
[FreeTextEntry1] : 42 female  h/o asthma w/ multiple prior intubations, idiopathic PE in 2018 (on Xarelto till 1/2020 - unclear prior APLS diagnosis), morbid obesity s/p lap sleeve, gastrectomy in 2012 w/ recent revision on 5/14/2021, h/osaddle PE w/ RV strain,presents for annual exam.  \par labs reviewed with patient, overall stable \par \par \par Obesity s/p sleeve gastrectomy by Dr. Kay.  noted weight loss \par -encouraged to continue with healthy diet and exercise \par \par Annual \par -Advise to get yearly Flu shot -given today\par -Advise Yearly Skin cancer screening with Dermatologist \par -Advise Yearly Eye exam with Ophthalmologist\par -Advise Yearly Dental exam\par -Educated of the importance of Healthy diet, such as Mediterranean Diet and Exercise, such as walking >20 minutes a day and increasing gradually as tolerated\par \par Preventative screening \par -advised to get PAP for cervical cancer screening -referral given\par -advised to get mammogram for breast cancer screening -referral given\par \par -covid -up to date \par \par \par \par \par \par \par \par \par \par \par \par \par \par \par

## 2022-09-06 NOTE — PHYSICAL EXAM
[Normal] : normal rate, regular rhythm, normal S1 and S2 and no murmur heard [Pedal Pulses Present] : the pedal pulses are present [No Edema] : there was no peripheral edema [No Extremity Clubbing/Cyanosis] : no extremity clubbing/cyanosis [Soft] : abdomen soft [Non Tender] : non-tender [Non-distended] : non-distended [No HSM] : no HSM [Normal Posterior Cervical Nodes] : no posterior cervical lymphadenopathy [Normal Anterior Cervical Nodes] : no anterior cervical lymphadenopathy [No CVA Tenderness] : no CVA  tenderness [No Focal Deficits] : no focal deficits [Normal Affect] : the affect was normal [Normal Mood] : the mood was normal

## 2022-09-08 ENCOUNTER — CLINICAL ADVICE (OUTPATIENT)
Age: 42
End: 2022-09-08

## 2022-09-08 ENCOUNTER — TRANSCRIPTION ENCOUNTER (OUTPATIENT)
Age: 42
End: 2022-09-08

## 2022-09-08 LAB
25(OH)D3 SERPL-MCNC: 17.8 NG/ML
ALBUMIN SERPL ELPH-MCNC: 4.1 G/DL
ALP BLD-CCNC: 98 U/L
ALT SERPL-CCNC: 17 U/L
ANION GAP SERPL CALC-SCNC: 9 MMOL/L
APPEARANCE: CLEAR
AST SERPL-CCNC: 19 U/L
BACTERIA: NEGATIVE
BASOPHILS # BLD AUTO: 0.05 K/UL
BASOPHILS NFR BLD AUTO: 0.8 %
BILIRUB DIRECT SERPL-MCNC: 0.2 MG/DL
BILIRUB INDIRECT SERPL-MCNC: 0.3 MG/DL
BILIRUB SERPL-MCNC: 0.5 MG/DL
BILIRUBIN URINE: NEGATIVE
BLOOD URINE: NEGATIVE
BUN SERPL-MCNC: 10 MG/DL
CALCIUM SERPL-MCNC: 9.1 MG/DL
CARDIOLIPIN IGM SER-MCNC: 5.2 MPL
CARDIOLIPIN IGM SER-MCNC: <5 GPL
CHLORIDE SERPL-SCNC: 105 MMOL/L
CHOLEST SERPL-MCNC: 146 MG/DL
CO2 SERPL-SCNC: 26 MMOL/L
COLOR: YELLOW
CREAT SERPL-MCNC: 0.57 MG/DL
EGFR: 116 ML/MIN/1.73M2
EOSINOPHIL # BLD AUTO: 0.26 K/UL
EOSINOPHIL NFR BLD AUTO: 4.1 %
ESTIMATED AVERAGE GLUCOSE: 108 MG/DL
FERRITIN SERPL-MCNC: 6 NG/ML
FOLATE SERPL-MCNC: 4.6 NG/ML
GLUCOSE QUALITATIVE U: NEGATIVE
GLUCOSE SERPL-MCNC: 82 MG/DL
HBA1C MFR BLD HPLC: 5.4 %
HCT VFR BLD CALC: 37 %
HDLC SERPL-MCNC: 71 MG/DL
HGB BLD-MCNC: 11 G/DL
HYALINE CASTS: 0 /LPF
IMM GRANULOCYTES NFR BLD AUTO: 0 %
IRON SATN MFR SERPL: 9 %
IRON SERPL-MCNC: 35 UG/DL
KETONES URINE: NEGATIVE
LDLC SERPL CALC-MCNC: 65 MG/DL
LEUKOCYTE ESTERASE URINE: NEGATIVE
LYMPHOCYTES # BLD AUTO: 1.95 K/UL
LYMPHOCYTES NFR BLD AUTO: 31.1 %
MAN DIFF?: NORMAL
MCHC RBC-ENTMCNC: 25.9 PG
MCHC RBC-ENTMCNC: 29.7 GM/DL
MCV RBC AUTO: 87.3 FL
MICROSCOPIC-UA: NORMAL
MONOCYTES # BLD AUTO: 0.43 K/UL
MONOCYTES NFR BLD AUTO: 6.8 %
NEUTROPHILS # BLD AUTO: 3.59 K/UL
NEUTROPHILS NFR BLD AUTO: 57.2 %
NITRITE URINE: NEGATIVE
NONHDLC SERPL-MCNC: 75 MG/DL
PH URINE: 6
PLATELET # BLD AUTO: 344 K/UL
POTASSIUM SERPL-SCNC: 4.1 MMOL/L
PROT SERPL-MCNC: 6.5 G/DL
PROTEIN URINE: NORMAL
RBC # BLD: 4.24 M/UL
RBC # FLD: 15.9 %
RED BLOOD CELLS URINE: 0 /HPF
SODIUM SERPL-SCNC: 140 MMOL/L
SPECIFIC GRAVITY URINE: 1.02
SQUAMOUS EPITHELIAL CELLS: 4 /HPF
TIBC SERPL-MCNC: 393 UG/DL
TRIGL SERPL-MCNC: 47 MG/DL
TSH SERPL-ACNC: 1.13 UIU/ML
UIBC SERPL-MCNC: 357 UG/DL
URINE COMMENTS: NORMAL
UROBILINOGEN URINE: NORMAL
VIT B12 SERPL-MCNC: 237 PG/ML
WBC # FLD AUTO: 6.28 K/UL
WHITE BLOOD CELLS URINE: 2 /HPF

## 2022-09-08 RX ORDER — FOLIC ACID 1 MG/1
1 TABLET ORAL DAILY
Qty: 90 | Refills: 3 | Status: ACTIVE | COMMUNITY
Start: 2022-09-08 | End: 1900-01-01

## 2022-09-12 ENCOUNTER — APPOINTMENT (OUTPATIENT)
Dept: HUMAN REPRODUCTION | Facility: CLINIC | Age: 42
End: 2022-09-12

## 2022-09-12 DIAGNOSIS — K21.9 GASTRO-ESOPHAGEAL REFLUX DISEASE W/OUT ESOPHAGITIS: ICD-10-CM

## 2022-09-12 PROCEDURE — 36415 COLL VENOUS BLD VENIPUNCTURE: CPT

## 2022-09-12 PROCEDURE — 99215 OFFICE O/P EST HI 40 MIN: CPT | Mod: 25

## 2022-09-12 PROCEDURE — 76830 TRANSVAGINAL US NON-OB: CPT

## 2022-09-12 RX ORDER — LIDOCAINE 5% 700 MG/1
5 PATCH TOPICAL
Qty: 12 | Refills: 0 | Status: ACTIVE | COMMUNITY
Start: 2020-09-30 | End: 1900-01-01

## 2022-09-12 RX ORDER — ONDANSETRON 4 MG/1
4 TABLET, ORALLY DISINTEGRATING ORAL
Qty: 21 | Refills: 0 | Status: ACTIVE | COMMUNITY
Start: 2021-03-30 | End: 1900-01-01

## 2022-09-12 RX ORDER — ALBUTEROL SULFATE 1.25 MG/3ML
1.25 SOLUTION RESPIRATORY (INHALATION)
Qty: 1 | Refills: 3 | Status: ACTIVE | COMMUNITY
Start: 2020-09-30 | End: 1900-01-01

## 2022-09-15 ENCOUNTER — APPOINTMENT (OUTPATIENT)
Dept: CARDIOLOGY | Facility: CLINIC | Age: 42
End: 2022-09-15

## 2022-09-15 ENCOUNTER — APPOINTMENT (OUTPATIENT)
Dept: ORTHOPEDIC SURGERY | Facility: CLINIC | Age: 42
End: 2022-09-15

## 2022-09-15 VITALS — WEIGHT: 177 LBS | HEIGHT: 63 IN | BODY MASS INDEX: 31.36 KG/M2

## 2022-09-15 PROCEDURE — 99213 OFFICE O/P EST LOW 20 MIN: CPT

## 2022-09-15 NOTE — DISCUSSION/SUMMARY
[FreeTextEntry1] : The underlying pathophysiology was reviewed with the patient. XR films were reviewed with the patient. Discussed at length the nature of the patient’s condition. The bilateral hand/wrist symptoms appear secondary to carpal tunnel syndrome, left worse than right.\par \par At this time, given the chronicity of her symptoms as well as clinical presentation I have recommended carpal tunnel release to the more symptomatic left side.\par \par The patient wishes to proceed with LEFT carpal tunnel release at this time. The risks and benefits were reviewed with the patient. All of her questions were answered. She will meet with our surgical scheduler.

## 2022-09-15 NOTE — END OF VISIT
[FreeTextEntry3] : All medical record entries made by the Scribe were at my,  Dr. Austin Horner MD., direction and personally dictated by me on 09/15/2022. I have personally reviewed the chart and agree that the record accurately reflects my personal performance of the history, physical exam, assessment and plan.

## 2022-09-15 NOTE — HISTORY OF PRESENT ILLNESS
[Right] : right hand dominant [FreeTextEntry1] : Patient is a 42 year old female who presents with complaints of bilateral hand/wrist numbness and tingling. L>R. She was last treated in this regard on 2/18/21. She elected for left carpal tunnel release at that time but the day before her scheduled surgery she was admitted to [facility] and therefore was not able to proceed with surgical release. 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 Her hands cramp, most notably in the morning. She would like to discuss surgery today.

## 2022-09-15 NOTE — PATIENT PROFILE ADULT - FUNCTIONAL SCREEN CURRENT LEVEL: SWALLOWING (IF SCORE 2 OR MORE FOR ANY ITEM, CONSULT REHAB SERVICES), MLM)
CALL RETURN FORM   Reason for patient call? Patient would like to speak with someone regarding extending her FMLA    Patient's primary oncologist? Dr Ashly Buckley   Name of person the patient was calling for? Dr Constantine Hernandez office   Any additional information to add, if applicable? N/A   Informed patient that the message will be forwarded to the team and someone will get back to them as soon as possible    Did you relay this information to the patient?  Yes 0 = swallows foods/liquids without difficulty

## 2022-09-15 NOTE — PHYSICAL EXAM
[de-identified] : Patient is WDWN, alert, and in no acute distress. Breathing is unlabored. She is grossly oriented to person, place, and time.\par \par Right Wrist: \par Mild tenderness, no edema, no deformities. No thenar atrophy. Full ROM with decreased sensation along median nerve distribution. \par Tests/Signs: Tinel's sign is negative over carpal tunnel, Phalen's test is positive. \par \par Left Wrist: \par Mild radiocarpal tenderness, mild edema, no deformities. Mild to moderate thenar atrophy. Full ROM with decreased sensation along median nerve distribution. Pain along extensor tendons with resistive wrist extension.\par Tests/Signs: Tinel's sign is positive over carpal tunnel, Phalen's test is positive.  [de-identified] : AP, lateral and oblique views of the BILATERAL wrists were obtained today and revealed no abnormalities. No acute fracture. No dislocation. Cartilage spaces are maintained.

## 2022-09-15 NOTE — ADDENDUM
[FreeTextEntry1] : I, Marium Parker wrote this note acting as a scribe for Dr. Austin Horner on Sep 15, 2022.

## 2022-09-19 ENCOUNTER — APPOINTMENT (OUTPATIENT)
Dept: ORTHOPEDIC SURGERY | Facility: CLINIC | Age: 42
End: 2022-09-19

## 2022-09-20 ENCOUNTER — OUTPATIENT (OUTPATIENT)
Dept: OUTPATIENT SERVICES | Facility: HOSPITAL | Age: 42
LOS: 1 days | End: 2022-09-20
Payer: COMMERCIAL

## 2022-09-20 ENCOUNTER — OUTPATIENT (OUTPATIENT)
Dept: OUTPATIENT SERVICES | Facility: HOSPITAL | Age: 42
LOS: 1 days | Discharge: HOME | End: 2022-09-20

## 2022-09-20 ENCOUNTER — NON-APPOINTMENT (OUTPATIENT)
Age: 42
End: 2022-09-20

## 2022-09-20 VITALS
TEMPERATURE: 98 F | RESPIRATION RATE: 14 BRPM | WEIGHT: 175.93 LBS | OXYGEN SATURATION: 99 % | HEART RATE: 62 BPM | HEIGHT: 63 IN | DIASTOLIC BLOOD PRESSURE: 78 MMHG | SYSTOLIC BLOOD PRESSURE: 113 MMHG

## 2022-09-20 DIAGNOSIS — G56.02 CARPAL TUNNEL SYNDROME, LEFT UPPER LIMB: ICD-10-CM

## 2022-09-20 DIAGNOSIS — Z98.84 BARIATRIC SURGERY STATUS: Chronic | ICD-10-CM

## 2022-09-20 DIAGNOSIS — Z98.890 OTHER SPECIFIED POSTPROCEDURAL STATES: ICD-10-CM

## 2022-09-20 DIAGNOSIS — Z98.890 OTHER SPECIFIED POSTPROCEDURAL STATES: Chronic | ICD-10-CM

## 2022-09-20 DIAGNOSIS — D68.2 HEREDITARY DEFICIENCY OF OTHER CLOTTING FACTORS: ICD-10-CM

## 2022-09-20 DIAGNOSIS — Z01.818 ENCOUNTER FOR OTHER PREPROCEDURAL EXAMINATION: ICD-10-CM

## 2022-09-20 DIAGNOSIS — Z87.19 PERSONAL HISTORY OF OTHER DISEASES OF THE DIGESTIVE SYSTEM: Chronic | ICD-10-CM

## 2022-09-20 LAB
HCG SERPL-ACNC: <1 MIU/ML — SIGNIFICANT CHANGE UP
HCT VFR BLD CALC: 34.9 % — SIGNIFICANT CHANGE UP (ref 34.5–45)
HGB BLD-MCNC: 10.9 G/DL — LOW (ref 11.5–15.5)
MCHC RBC-ENTMCNC: 25.9 PG — LOW (ref 27–34)
MCHC RBC-ENTMCNC: 31.2 GM/DL — LOW (ref 32–36)
MCV RBC AUTO: 82.9 FL — SIGNIFICANT CHANGE UP (ref 80–100)
NRBC # BLD: 0 /100 WBCS — SIGNIFICANT CHANGE UP (ref 0–0)
PLATELET # BLD AUTO: 379 K/UL — SIGNIFICANT CHANGE UP (ref 150–400)
RBC # BLD: 4.21 M/UL — SIGNIFICANT CHANGE UP (ref 3.8–5.2)
RBC # FLD: 15 % — HIGH (ref 10.3–14.5)
SARS-COV-2 RNA SPEC QL NAA+PROBE: SIGNIFICANT CHANGE UP
WBC # BLD: 5.78 K/UL — SIGNIFICANT CHANGE UP (ref 3.8–10.5)
WBC # FLD AUTO: 5.78 K/UL — SIGNIFICANT CHANGE UP (ref 3.8–10.5)

## 2022-09-20 PROCEDURE — 84702 CHORIONIC GONADOTROPIN TEST: CPT

## 2022-09-20 PROCEDURE — U0003: CPT

## 2022-09-20 PROCEDURE — 85027 COMPLETE CBC AUTOMATED: CPT

## 2022-09-20 PROCEDURE — U0005: CPT

## 2022-09-20 PROCEDURE — 36415 COLL VENOUS BLD VENIPUNCTURE: CPT

## 2022-09-20 PROCEDURE — G0463: CPT

## 2022-09-20 RX ORDER — CHOLECALCIFEROL (VITAMIN D3) 125 MCG
1 CAPSULE ORAL
Qty: 0 | Refills: 0 | DISCHARGE

## 2022-09-20 RX ORDER — FLUTICASONE PROPIONATE 220 MCG
1 AEROSOL WITH ADAPTER (GRAM) INHALATION
Qty: 0 | Refills: 0 | DISCHARGE

## 2022-09-20 RX ORDER — ALPRAZOLAM 0.25 MG
1 TABLET ORAL
Qty: 0 | Refills: 0 | DISCHARGE

## 2022-09-20 RX ORDER — OMEPRAZOLE 10 MG/1
1 CAPSULE, DELAYED RELEASE ORAL
Qty: 0 | Refills: 0 | DISCHARGE

## 2022-09-20 RX ORDER — MULTIVIT-MIN/FERROUS GLUCONATE 9 MG/15 ML
1 LIQUID (ML) ORAL
Qty: 0 | Refills: 0 | DISCHARGE

## 2022-09-20 NOTE — H&P PST ADULT - MUSCULOSKELETAL COMMENTS
bilateral hand ;  positive phalen's and negative tinel's signs bilateral hand ;  positive  tinel's signs

## 2022-09-20 NOTE — H&P PST ADULT - ASSESSMENT
39 yo female presents with left carpal tunnel syndrome.  43 yo female presents with left carpal tunnel syndrome.

## 2022-09-20 NOTE — H&P PST ADULT - NSICDXPASTSURGICALHX_GEN_ALL_CORE_FT
PAST SURGICAL HISTORY:  H/O hiatal hernia 2012 during gastric sleeve surgery    H/O rectal polypectomy 2012 benign    History of weight loss surgery sleeve gastrectomy revision, YANCI procedure and hiatal hernia repair 5/14/21;    Status post laparoscopic sleeve gastrectomy 2012

## 2022-09-20 NOTE — H&P PST ADULT - NSICDXPASTMEDICALHX_GEN_ALL_CORE_FT
PAST MEDICAL HISTORY:  2019 novel coronavirus disease (COVID-19) 9/2021    Anxiety     Asthma intubated 4 times. Last time at age 12 (1992)    Cervical herniated disc C 5 and 6    COVID-19 vaccine series completed moderna, completed 1/27/21    GERD (gastroesophageal reflux disease)     Knee dislocation, left, sequela 2010    Left carpal tunnel syndrome     Pulmonary embolism October 2018 had 5 PEs, work up negative, treated with Eliquis   June 2021 s/p bariatric surgery treated with Lovenox injection for 6 months and Eliquis for 6 months last dose of Eliquis on   May , 2022    Varicose veins     Vertigo, benign positional      PAST MEDICAL HISTORY:  2019 novel coronavirus disease (COVID-19) 9/2021    Anxiety     Asthma intubated 4 times. Last time at age 12 (1992)    Cervical herniated disc C 5 and 6    COVID-19 vaccine series completed moderna, completed 1/27/21    GERD (gastroesophageal reflux disease)     Knee dislocation, left, sequela 2010    Left carpal tunnel syndrome     Pulmonary embolism October 2018 had 5 PEs, work up negative, treated with Eliquis   Had PE June 2021 post op  bariatric surgery treated with Lovenox injection for 6 months and Eliquis for 6 months last dose of Eliquis on   May , 2022    Varicose veins     Vertigo, benign positional      PAST MEDICAL HISTORY:  2019 novel coronavirus disease (COVID-19) 9/2021    Anxiety     Asthma intubated 4 times. Last time at age 12 (1992)    Cervical herniated disc C 5 and 6    COVID-19 vaccine series completed moderna, completed 1/27/21    GERD (gastroesophageal reflux disease)     Knee dislocation, left, sequela 2010    Left carpal tunnel syndrome     Pulmonary embolism October 2018 had 5 PEs, work up negative, treated with Eliquis   Had PE June 2021 post op  bariatric surgery treated with Lovenox injection for 6 months and Eliquis for 6 months last dose of Eliquis on   May , 2022    Sinus bradycardia     Varicose veins     Vertigo, benign positional

## 2022-09-20 NOTE — H&P PST ADULT - HISTORY OF PRESENT ILLNESS
39 yo female presents with 3-4 year history of known left carpal tunnel syndrome. Symptoms were intermittent and controlled with wearing a splint. In December 2020 she developed pain in the wrist which radiated up to the elbow. She received a cortisone injection in February 2021 and had 2 months of pain relief. Pain now 2-3/10 at rest and increased to 7-8/10 with use of hand. She also reports intermittent hand numbness and tingling associated with use of the hand. Currently denies using any pain relief measures. This is a 43 yo female presents with 4 year history worsening bilateral carpal tunnel syndromeleft worse than right Reports pain, numbness  weakness and tingling in both hands  She received a cortisone injection in February 2021 and had 2 months of pain relief. She also reports intermittent hand numbness and tingling associated with use of the hand. Currently denies using any pain relief measures. scheduled for left carpal tunnel release on 9/23/22 and planning to do right carpal tunnel on 10/7/22 . This is a 41 yo female presents with 4 year history of worsening bilateral carpal tunnel syndrome left worse than right .Reports pain, numbness  weakness and tingling in both hands  She received a cortisone injection in February 2021 and had 2 months of pain relief. She also reports intermittent hand numbness and tingling associated with use of the hands. Currently denies using any pain relief measures. scheduled for left carpal tunnel release on 9/23/22 and planning to do right carpal tunnel on 10/7/22 .

## 2022-09-20 NOTE — H&P PST ADULT - PROBLEM SELECTOR PLAN 1
scheduled for left carpal tunnel release on 9/23/22   will obtain medical clearance   called for hematology note   pre op instructions .   CBC, HCG and covid test done today

## 2022-09-20 NOTE — H&P PST ADULT - MUSCULOSKELETAL
bilateral hand/no joint swelling/no joint erythema/no joint warmth/no calf tenderness/decreased ROM due to pain/decreased strength details… bilateral hand tender on palpation/no joint swelling/no joint erythema/no joint warmth/no calf tenderness/decreased ROM due to pain/decreased strength

## 2022-09-20 NOTE — H&P PST ADULT - RESPIRATORY
clear to auscultation bilaterally/no wheezes clear to auscultation bilaterally/no wheezes/no rales/no rhonchi

## 2022-09-21 ENCOUNTER — APPOINTMENT (OUTPATIENT)
Dept: HEMATOLOGY ONCOLOGY | Facility: CLINIC | Age: 42
End: 2022-09-21

## 2022-09-21 PROCEDURE — 99214 OFFICE O/P EST MOD 30 MIN: CPT | Mod: 95

## 2022-09-21 NOTE — HISTORY OF PRESENT ILLNESS
[de-identified] : This is a 42 year old woman with a history of pulmonary embolism.  Patient initially presented in setting of upcoming bariatric surgery in 2020.  Hypercoagulable sate workup was unremarkable. Patient was reccomended to use prophylactic Lovenox following bariatric surgery for prophylaxis. patient had been compliant wit this dose, barbaric surgery was completed without any complications.  Since then the lovenox had been discontinued and patient was placed on a prophylactic 81mg aspirin. She feel well and has no complaints outside of carpal tunnel syndrome. Has an upcoming carpal tunnel release on 9/23 for which she was instructed to see us again for clearance.\par \par Following the bariatric surgery is still loosing about 1 lb a day.  Lost a total of 30 lbs since discharge from the bariatric surgery on May 14th.

## 2022-09-21 NOTE — ASSESSMENT
[FreeTextEntry1] : This is a 42 year old woman with a history of extensive bilateral unprovoked PE in October 30th 2019.  Patient had a hypercoagulable workup at the time, anticardiolipin antibodies were negative, Protein C and S normal, no evidence of a hypercoagulable state.  Kayce has an upcoming carpal tunnel release scheduled with Dr. Austin Horner on 9/23.  This is a low risk upper extremity procedure. Patient would not require DVT prophylaxis like she did for her bariatric surgery.  \par \par Patietdirk is hematologically cleared for upcoming carpal tunnel release surgery with Dr. Austin Horner. Does not require any medication for optimization.  DOes not require post operative DVT prophylaxis.  Reccomended she hold the aspirin the morning of the procedure just in case, the previous doses will still be very much in effect on that day as the platelet inhibition form aspirin lasts for about a week.  \par \par Surgeon Dr. Austin Horner  [phone]

## 2022-09-22 ENCOUNTER — TRANSCRIPTION ENCOUNTER (OUTPATIENT)
Age: 42
End: 2022-09-22

## 2022-09-23 ENCOUNTER — APPOINTMENT (OUTPATIENT)
Dept: ORTHOPEDIC SURGERY | Facility: HOSPITAL | Age: 42
End: 2022-09-23

## 2022-09-23 ENCOUNTER — TRANSCRIPTION ENCOUNTER (OUTPATIENT)
Age: 42
End: 2022-09-23

## 2022-09-23 ENCOUNTER — OUTPATIENT (OUTPATIENT)
Dept: OUTPATIENT SERVICES | Facility: HOSPITAL | Age: 42
LOS: 1 days | End: 2022-09-23
Payer: COMMERCIAL

## 2022-09-23 VITALS
HEART RATE: 57 BPM | RESPIRATION RATE: 15 BRPM | SYSTOLIC BLOOD PRESSURE: 115 MMHG | WEIGHT: 175.93 LBS | HEIGHT: 63 IN | TEMPERATURE: 98 F | DIASTOLIC BLOOD PRESSURE: 68 MMHG | OXYGEN SATURATION: 99 %

## 2022-09-23 VITALS — DIASTOLIC BLOOD PRESSURE: 67 MMHG | SYSTOLIC BLOOD PRESSURE: 108 MMHG

## 2022-09-23 DIAGNOSIS — Z98.84 BARIATRIC SURGERY STATUS: Chronic | ICD-10-CM

## 2022-09-23 DIAGNOSIS — G56.02 CARPAL TUNNEL SYNDROME, LEFT UPPER LIMB: ICD-10-CM

## 2022-09-23 DIAGNOSIS — Z87.19 PERSONAL HISTORY OF OTHER DISEASES OF THE DIGESTIVE SYSTEM: Chronic | ICD-10-CM

## 2022-09-23 DIAGNOSIS — Z98.890 OTHER SPECIFIED POSTPROCEDURAL STATES: Chronic | ICD-10-CM

## 2022-09-23 DIAGNOSIS — Z01.818 ENCOUNTER FOR OTHER PREPROCEDURAL EXAMINATION: ICD-10-CM

## 2022-09-23 PROCEDURE — 64721 CARPAL TUNNEL SURGERY: CPT | Mod: LT

## 2022-09-23 RX ORDER — OMEPRAZOLE 10 MG/1
0 CAPSULE, DELAYED RELEASE ORAL
Qty: 0 | Refills: 0 | DISCHARGE

## 2022-09-23 RX ORDER — HYDROMORPHONE HYDROCHLORIDE 2 MG/ML
0.25 INJECTION INTRAMUSCULAR; INTRAVENOUS; SUBCUTANEOUS
Refills: 0 | Status: DISCONTINUED | OUTPATIENT
Start: 2022-09-23 | End: 2022-09-23

## 2022-09-23 RX ORDER — CEFAZOLIN SODIUM 1 G
2000 VIAL (EA) INJECTION ONCE
Refills: 0 | Status: COMPLETED | OUTPATIENT
Start: 2022-09-23 | End: 2022-09-23

## 2022-09-23 RX ORDER — CHLORHEXIDINE GLUCONATE 213 G/1000ML
1 SOLUTION TOPICAL ONCE
Refills: 0 | Status: COMPLETED | OUTPATIENT
Start: 2022-09-23 | End: 2022-09-23

## 2022-09-23 RX ORDER — ONDANSETRON 8 MG/1
4 TABLET, FILM COATED ORAL ONCE
Refills: 0 | Status: COMPLETED | OUTPATIENT
Start: 2022-09-23 | End: 2022-09-23

## 2022-09-23 RX ORDER — ONDANSETRON 8 MG/1
0 TABLET, FILM COATED ORAL
Qty: 0 | Refills: 0 | DISCHARGE

## 2022-09-23 RX ORDER — HYDROMORPHONE HYDROCHLORIDE 2 MG/ML
0.5 INJECTION INTRAMUSCULAR; INTRAVENOUS; SUBCUTANEOUS
Refills: 0 | Status: DISCONTINUED | OUTPATIENT
Start: 2022-09-23 | End: 2022-09-23

## 2022-09-23 RX ORDER — SODIUM CHLORIDE 9 MG/ML
1000 INJECTION, SOLUTION INTRAVENOUS
Refills: 0 | Status: DISCONTINUED | OUTPATIENT
Start: 2022-09-23 | End: 2022-09-23

## 2022-09-23 RX ORDER — ALBUTEROL 90 UG/1
2 AEROSOL, METERED ORAL
Qty: 0 | Refills: 0 | DISCHARGE

## 2022-09-23 RX ORDER — OXYCODONE HYDROCHLORIDE 5 MG/1
5 TABLET ORAL ONCE
Refills: 0 | Status: DISCONTINUED | OUTPATIENT
Start: 2022-09-23 | End: 2022-09-23

## 2022-09-23 RX ORDER — ASPIRIN/CALCIUM CARB/MAGNESIUM 324 MG
0 TABLET ORAL
Qty: 0 | Refills: 0 | DISCHARGE

## 2022-09-23 RX ADMIN — ONDANSETRON 4 MILLIGRAM(S): 8 TABLET, FILM COATED ORAL at 09:22

## 2022-09-23 RX ADMIN — CHLORHEXIDINE GLUCONATE 1 APPLICATION(S): 213 SOLUTION TOPICAL at 06:30

## 2022-09-23 RX ADMIN — HYDROMORPHONE HYDROCHLORIDE 0.5 MILLIGRAM(S): 2 INJECTION INTRAMUSCULAR; INTRAVENOUS; SUBCUTANEOUS at 08:36

## 2022-09-23 NOTE — ASU PATIENT PROFILE, ADULT - NSICDXPASTMEDICALHX_GEN_ALL_CORE_FT
PAST MEDICAL HISTORY:  2019 novel coronavirus disease (COVID-19) 9/2021    Anxiety     Asthma intubated 4 times. Last time at age 12 (1992)    Cervical herniated disc C 5 and 6    COVID-19 vaccine series completed moderna, completed 1/27/21    GERD (gastroesophageal reflux disease)     Knee dislocation, left, sequela 2010    Left carpal tunnel syndrome     Pulmonary embolism October 2018 had 5 PEs, work up negative, treated with Eliquis   Had PE June 2021 post op  bariatric surgery treated with Lovenox injection for 6 months and Eliquis for 6 months last dose of Eliquis on   May , 2022    Sinus bradycardia     Varicose veins     Vertigo, benign positional

## 2022-09-23 NOTE — ASU DISCHARGE PLAN (ADULT/PEDIATRIC) - NS MD DC FALL RISK RISK
For information on Fall & Injury Prevention, visit: [email or web address] OR  [email or web address] OR  https://www.cdc.gov/steadi/patient.html

## 2022-09-23 NOTE — ASU DISCHARGE PLAN (ADULT/PEDIATRIC) - ASU DC SPECIAL INSTRUCTIONSFT
Elevate  Left  arm in sling daily when up & walking.  Elevate the  hand/arm above heart level on pillow/blankets when lying down.  Pad the neck strap with athletic sock/collared shirt.  Apply ice packs to top of  Left  hand for 20 min on and off  Keep bandage clean, dry , & intact until seen in office  May open & close the fingers of the operated arm every hour for exercise.  Call the Dr.  for fever, severe pain, fall or hand injury.  Call for an appointment for office visit  in  10 days.

## 2022-09-23 NOTE — ASU DISCHARGE PLAN (ADULT/PEDIATRIC) - CARE COORDINATION DISCHARGE PLANNING
Mom called and said Mónica is now sick and wondered if you would be able to send in an antibiotic for her to [facility] in [facility], located within [facility]  No

## 2022-09-23 NOTE — ASU DISCHARGE PLAN (ADULT/PEDIATRIC) - CARE PROVIDER_API CALL
Austin Horner)  Orthopaedic Surgery  [address]  [place] [address]  Phone: [phone]  Fax: [phone]  Follow Up Time:

## 2022-09-26 RX ORDER — OXYCODONE 5 MG/1
5 TABLET ORAL
Qty: 5 | Refills: 0 | Status: ACTIVE | COMMUNITY
Start: 2022-09-26 | End: 1900-01-01

## 2022-09-28 ENCOUNTER — APPOINTMENT (OUTPATIENT)
Dept: MAMMOGRAPHY | Facility: IMAGING CENTER | Age: 42
End: 2022-09-28

## 2022-09-28 PROBLEM — R00.1 BRADYCARDIA, UNSPECIFIED: Chronic | Status: ACTIVE | Noted: 2022-09-20

## 2022-09-28 PROBLEM — U07.1 COVID-19: Chronic | Status: ACTIVE | Noted: 2022-09-20

## 2022-09-28 PROBLEM — I26.99 OTHER PULMONARY EMBOLISM WITHOUT ACUTE COR PULMONALE: Chronic | Status: ACTIVE | Noted: 2019-03-07

## 2022-10-03 ENCOUNTER — APPOINTMENT (OUTPATIENT)
Dept: ORTHOPEDIC SURGERY | Facility: CLINIC | Age: 42
End: 2022-10-03

## 2022-10-03 ENCOUNTER — NON-APPOINTMENT (OUTPATIENT)
Age: 42
End: 2022-10-03

## 2022-10-03 DIAGNOSIS — U07.1 COVID-19: ICD-10-CM

## 2022-10-03 DIAGNOSIS — G56.03 CARPAL TUNNEL SYNDROM,BILATERAL UPPER LIMBS: ICD-10-CM

## 2022-10-03 PROCEDURE — 99024 POSTOP FOLLOW-UP VISIT: CPT

## 2022-10-03 RX ORDER — NIRMATRELVIR AND RITONAVIR 150-100 MG
10 X 150 MG & KIT ORAL
Qty: 10 | Refills: 0 | Status: ACTIVE | COMMUNITY
Start: 2022-10-03 | End: 1900-01-01

## 2022-10-03 RX ORDER — LEVALBUTEROL HYDROCHLORIDE 1.25 MG/3ML
1.25 SOLUTION RESPIRATORY (INHALATION)
Qty: 2 | Refills: 1 | Status: DISCONTINUED | COMMUNITY
Start: 2022-09-12 | End: 2022-10-03

## 2022-10-03 NOTE — END OF VISIT
[FreeTextEntry3] : All medical record entries made by the Scribe were at my,  Dr. Austin Horner MD., direction and personally dictated by me on 10/03/2022. I have personally reviewed the chart and agree that the record accurately reflects my personal performance of the history, physical exam, assessment and plan.

## 2022-10-03 NOTE — ADDENDUM
[FreeTextEntry1] : I, Marium Parker wrote this note acting as a scribe for Dr. Austin Horner on Oct 03, 2022.

## 2022-10-03 NOTE — HISTORY OF PRESENT ILLNESS
[de-identified] : s/p left carpal tunnel release. [de-identified] : The patient is a 42 year female who returns for the 1st postoperative visit after undergoing left carpal tunnel release at [facility]. The surgery was on 09/23/2022. The patient is recovering at home. She reports mild postoperative pain. She notes improvement in sensation to the digits as compared to preoperatively. [de-identified] : Patient is WDWN, alert, and in no acute distress. Breathing is unlabored. He is grossly oriented to person, place, and time.\par \par She is accompanied by her  today.\par \par Incision is healing well. There are no signs of infection. Sutures were removed over the left carpal tunnel. Normal amount of postoperative edema and tenderness present.\par Improvements in sensation noted to the digits as compared to preoperatively. [de-identified] : no imaging today [de-identified] : Sutures were removed. Benzoin and steri strips were applied. She was instructed on local wound care and to keep the incision site dry until 14 days postoperatively. She may then begin to massage the scar with vitamin E oil once the strips have fallen off. Gentle range of motion, stretching, and strengthening exercises were encouraged. Patient should actively work on gradually increasing use of the hand, as tolerated. Follow up in 6 weeks, if needed.

## 2022-10-07 ENCOUNTER — APPOINTMENT (OUTPATIENT)
Dept: ORTHOPEDIC SURGERY | Facility: HOSPITAL | Age: 42
End: 2022-10-07

## 2022-10-10 ENCOUNTER — APPOINTMENT (OUTPATIENT)
Dept: ORTHOPEDIC SURGERY | Facility: CLINIC | Age: 42
End: 2022-10-10

## 2022-10-19 NOTE — PROGRESS NOTE ADULT - PROBLEM/PLAN-3
Gen: awake and responsive. Eyes: anicteric, normal lids. Mouth: covered with mask. Nose: covered with mask. Hearing: intact grossly. Neck: trachea midline and no jvd. CV: RRR no s3. Lungs: clear. No wheezes, Abd: Soft, nabs, nr, NT. No liver or spleen. Ext: no sig edema, some palm erythema. Some finger deformity from RA noted. Neuro: moves all 4 ext grossly. No asterixis. Skin: no pruritis and some palm erythema.
DISPLAY PLAN FREE TEXT

## 2022-11-14 RX ORDER — IPRATROPIUM BROMIDE 17 UG/1
17 AEROSOL, METERED RESPIRATORY (INHALATION) TWICE DAILY
Qty: 1 | Refills: 0 | Status: ACTIVE | COMMUNITY
Start: 2022-11-14 | End: 1900-01-01

## 2022-11-14 RX ORDER — BENZONATATE 100 MG/1
100 CAPSULE ORAL 3 TIMES DAILY
Qty: 21 | Refills: 0 | Status: ACTIVE | COMMUNITY
Start: 2022-11-14 | End: 1900-01-01

## 2022-11-16 NOTE — ED PROVIDER NOTE - RESPIRATORY [+], MLM
BG levels review with pt today during her apt.   
From: Raquel Jacobson  To: Missy Damon  Sent: 11/15/2022 3:23 PM CST  Subject: For nov 16 appt Bs readings oct 14 to nov 15    Attached bs readings  
COUGH

## 2022-11-22 NOTE — ASU PREOP CHECKLIST - BMI (KG/M2)
What Type Of Note Output Would You Prefer (Optional)?: Standard Output
How Severe Is Your Skin Lesion?: mild
Has Your Skin Lesion Been Treated?: not been treated
Is This A New Presentation, Or A Follow-Up?: Skin Lesion
31.2

## 2023-01-17 ENCOUNTER — APPOINTMENT (OUTPATIENT)
Dept: INTERNAL MEDICINE | Facility: CLINIC | Age: 43
End: 2023-01-17
Payer: COMMERCIAL

## 2023-01-17 DIAGNOSIS — R53.83 OTHER FATIGUE: ICD-10-CM

## 2023-01-17 DIAGNOSIS — D50.9 IRON DEFICIENCY ANEMIA, UNSPECIFIED: ICD-10-CM

## 2023-01-17 DIAGNOSIS — E53.8 DEFICIENCY OF OTHER SPECIFIED B GROUP VITAMINS: ICD-10-CM

## 2023-01-17 DIAGNOSIS — J45.909 UNSPECIFIED ASTHMA, UNCOMPLICATED: ICD-10-CM

## 2023-01-17 DIAGNOSIS — Z90.3 ACQUIRED ABSENCE OF STOMACH [PART OF]: ICD-10-CM

## 2023-01-17 PROCEDURE — 99214 OFFICE O/P EST MOD 30 MIN: CPT | Mod: 95

## 2023-01-18 PROBLEM — J45.909 ASTHMA: Status: ACTIVE | Noted: 2018-11-13

## 2023-01-18 PROBLEM — D50.9 ANEMIA, IRON DEFICIENCY: Status: ACTIVE | Noted: 2020-06-26

## 2023-01-18 PROBLEM — Z90.3 HISTORY OF SLEEVE GASTRECTOMY: Status: ACTIVE | Noted: 2021-08-06

## 2023-01-18 PROBLEM — E53.8 FOLIC ACID DEFICIENCY: Status: ACTIVE | Noted: 2022-09-08

## 2023-01-18 NOTE — PHYSICAL EXAM
[de-identified] : limited due to telehealth.  speaking in complete sentences in no acute distress

## 2023-01-18 NOTE — HISTORY OF PRESENT ILLNESS
[Home] : at home, [unfilled] , at the time of the visit. [Medical Office: ([facility])___] : at the medical office located in  [Verbal consent obtained from patient] : the patient, [unfilled] [de-identified] : 43 y/o female with h/o asthma w/ multiple prior intubations, idiopathic PE in 2018 (on Xarelto till 1/2020 - unclear prior APLS diagnosis), morbid obesity s/p lap sleeve, gastrectomy in 2012 w/ recent revision on 5/14/2021 c/o fatigue over the last several months.\par \par wasn’t taking her MV but recently restarted.  \par \par no issues with sleep, anxiety/depression, \par \par \par \par \par \par \par \par \par

## 2023-01-20 ENCOUNTER — RX CHANGE (OUTPATIENT)
Age: 43
End: 2023-01-20

## 2023-02-08 RX ORDER — LEVALBUTEROL TARTRATE 45 UG/1
45 AEROSOL, METERED ORAL
Qty: 1 | Refills: 2 | Status: ACTIVE | COMMUNITY
Start: 2022-10-03 | End: 1900-01-01

## 2023-02-22 ENCOUNTER — APPOINTMENT (OUTPATIENT)
Dept: POPULATION HEALTH | Facility: CLINIC | Age: 43
End: 2023-02-22

## 2023-03-03 ENCOUNTER — APPOINTMENT (OUTPATIENT)
Dept: POPULATION HEALTH | Facility: CLINIC | Age: 43
End: 2023-03-03

## 2023-03-29 ENCOUNTER — RESULT REVIEW (OUTPATIENT)
Age: 43
End: 2023-03-29

## 2023-03-29 ENCOUNTER — APPOINTMENT (OUTPATIENT)
Dept: MAMMOGRAPHY | Facility: CLINIC | Age: 43
End: 2023-03-29
Payer: COMMERCIAL

## 2023-03-29 DIAGNOSIS — Z00.00 ENCOUNTER FOR GENERAL ADULT MEDICAL EXAMINATION W/OUT ABNORMAL FINDINGS: ICD-10-CM

## 2023-03-29 PROCEDURE — 77067 SCR MAMMO BI INCL CAD: CPT

## 2023-03-29 PROCEDURE — 77063 BREAST TOMOSYNTHESIS BI: CPT

## 2023-03-30 ENCOUNTER — CLINICAL ADVICE (OUTPATIENT)
Age: 43
End: 2023-03-30

## 2023-04-08 NOTE — ED PROVIDER NOTE - NS_EDPROVIDERDISPOUSERTYPE_ED_A_ED
Reviewed Herceptin/Perjeta infusion with patient. Verbalizes understanding.
Yes
Attending Attestation (For Attendings USE Only)...

## 2023-04-13 NOTE — ED ADULT NURSE NOTE - NS ED NURSE IV DC DT
Take naproxen as prescribed as needed for pain  Remove Lidoderm patch 12 hours after placed, then take break for 12 hours    Can try over-the-counter pain patches as needed for pain relief  Rest and limited heavy lifting to help alleviate symptoms  Follow-up with Sports Medicine as soon as possible for further evaluation of knee pain and low back pain  Return to ED if symptoms worsen including increased swelling, erythema, numbness or tingling in your legs, weakness, loss of bowel or bladder function, numbness in your groin ambulatory 15-Dec-2018 00:54

## 2023-04-17 RX ORDER — ALPRAZOLAM 0.5 MG/1
0.5 TABLET ORAL
Qty: 30 | Refills: 0 | Status: ACTIVE | COMMUNITY
Start: 2019-12-23 | End: 1900-01-01

## 2023-06-15 NOTE — DIETITIAN NUTRITION RISK NOTIFICATION - TREATMENT: THE FOLLOWING DIET HAS BEEN RECOMMENDED
[FreeTextEntry1] : 33 year old woman sent by pcp for evaluation of Proteinuria/ hematuria \par Previously UA with high sp gravity, heavy proteinuria- \par Repeat UA now with sp gravity 1.018, negative blood, protein\par \par + ketones\par alb/cr ratio ~ 17 mg/g\par Normal appearing kidneys on abdominal US\par Previously given dx of lupus nephritis ( No kidney biopsy- no other systemic symptoms to suggest SLE )\par \par \par Pt has uterine fibroids- microscopic hematuria from ? fibroids - now resolved\par High urine PH likely from vomiting- pt\par No  intervention at this time\par Encouraged po intake \par RTC prn\par \par \par 
Diet, Regular:   Phase 3 Bariatric Regular ([id number]) (06-04-21 @ 09:39) [Active]

## 2023-07-27 ENCOUNTER — RX RENEWAL (OUTPATIENT)
Age: 43
End: 2023-07-27

## 2023-07-27 RX ORDER — OMEPRAZOLE 40 MG/1
40 CAPSULE, DELAYED RELEASE ORAL TWICE DAILY
Qty: 180 | Refills: 1 | Status: ACTIVE | COMMUNITY
Start: 2023-07-27 | End: 1900-01-01

## 2023-07-30 NOTE — DISCHARGE NOTE ADULT - PRINCIPAL DIAGNOSIS
Pulmonary embolism no loss of consciousness, no gait abnormality, no headache, no sensory deficits, and no weakness.

## 2023-10-26 NOTE — ED PROVIDER NOTE - NS ED ATTENDING STATEMENT MOD
97
I have personally performed a face to face diagnostic evaluation on this patient. I have reviewed the ACP note and agree with the history, exam and plan of care, except as noted.

## 2024-02-06 NOTE — ED ADULT NURSE NOTE - PMH
Asthma    Knee dislocation, left, sequela    Meniscus tear    Morbid obesity    Polyp  rectal  Pulmonary embolism    Vertigo, benign positional
continue as per group home. Follow up with outpatient psychiatrist to discuss possible medication adjustments

## 2024-02-12 NOTE — ASU PATIENT PROFILE, ADULT - FALL HARM RISK - PT AGE POPULATION HIDDEN
Positive Screening Text: A score of 3 or greater is considered a positive PEST score.
Have You Ever Had A Swollen Joint?: Yes
Negative Screening Text: A score of less than 3 is considered a negative PEST score.
Do Your Fingernails Or Tonails Have Holes Or Pits?: No
Detail Level: Simple
Adult

## 2024-05-19 ENCOUNTER — NON-APPOINTMENT (OUTPATIENT)
Age: 44
End: 2024-05-19

## 2024-05-31 NOTE — H&P ADULT - NSHPREVIEWOFSYSTEMS_GEN_ALL_CORE
I sent them and I will let Carolina know.  Thanks CONSTITUTIONAL: Negative for fever, chills, fatigue. Recent weight loss surgery.  ENT/MOUTH: Negative for hearing difficulties, ear pain, sore throat, rhinorrhea  EYES: Negative for eye pain, vision changes  CARDIOVASCULAR: As per HPI  RESPIRATORY: As per HPI  GASTROINTESTINAL: Negative for nausea, vomiting, diarrhea, constipation  GENITOURINARY: Negative for dysuria, decreased urinary frequency, hematuria  MUSCULOSKELETAL: Left calf swelling  SKIN: Negative for skin lesions, rashes, hair changes  NEUROLOGICAL: Negative for headache, weakness, numbness, parasthesias  PSYCHIATRIC: Negative for depression, anxiety  HEME/LYMPH: PE  ENDOCRINE: Negative for polyuria, polydipsia, temperature intolerance

## 2024-06-05 NOTE — REASON FOR VISIT
Chief Complaint   Patient presents with    Procedure     Botox injections   Jolene Noguera, EMT  
[Follow-Up Visit] : a follow-up visit for

## 2024-09-30 ENCOUNTER — RX RENEWAL (OUTPATIENT)
Age: 44
End: 2024-09-30

## 2025-01-05 NOTE — DISCHARGE NOTE ADULT - CARE PROVIDERS DIRECT ADDRESSES
MELVA MARCANO  [facility] [facility]  OFFICE [address]  [place] [address]  Phone: [phone]  Fax: [phone]  Follow Up Time: 1 week  
,DirectAddress_Unknown,DirectAddress_Unknown,DirectAddress_Unknown

## 2025-02-17 NOTE — PATIENT PROFILE ADULT - HOW PATIENT ADDRESSED, PROFILE
Patient: Sarah Ugalde    Procedure: Procedure(s):  COLONOSCOPY WITH BIOPSY AND UPPER GASTROINTESTINAL TRACT ENDOSCOPY WITH BIOPSY       Diagnosis: Epigastric pain [R10.13]  Rectal bleeding [K62.5]  Family history of colon cancer [Z80.0]  Diagnosis Additional Information: No value filed.    Anesthesia Type:   MAC     Note:    Oropharynx: oropharynx clear of all foreign objects and spontaneously breathing  Level of Consciousness: awake  Oxygen Supplementation: room air    Independent Airway: airway patency satisfactory and stable  Dentition: dentition unchanged  Vital Signs Stable: post-procedure vital signs reviewed and stable  Report to RN Given: handoff report given  Patient transferred to: Phase II    Handoff Report: Identifed the Patient, Identified the Reponsible Provider, Reviewed the pertinent medical history, Discussed the surgical course, Reviewed Intra-OP anesthesia mangement and issues during anesthesia, Set expectations for post-procedure period and Allowed opportunity for questions and acknowledgement of understanding      Vitals:  Vitals Value Taken Time   BP     Temp     Pulse     Resp     SpO2         Electronically Signed By: EVERARDO PUTNAM CRNA  February 17, 2025  10:44 AM  
fernando

## 2025-02-25 NOTE — ED CDU PROVIDER SUBSEQUENT DAY NOTE - CPE EDP MUSC NORM
NOTIFICATION OF INPATIENT ADMISSION   AUTHORIZATION REQUEST   SERVICING FACILITY:   [place] [address]  Tax ID: [id number]  NPI: [id number]   ATTENDING PROVIDER:  Attending Name and NPI#: Zainab Negro Md [[id number]]  Address: [address] [place] [address]  Phone: [phone]     ADMISSION INFORMATION:  Place of Service: Inpatient [facility]  Place of Service Code: 21  Inpatient Admission Date/Time: 2/24/25  8:39 AM  Discharge Date/Time: No discharge date for patient encounter.  Admitting Diagnosis Code/Description:  COPD exacerbation (HCC) [J44.1]     UTILIZATION REVIEW CONTACT:  Leah Degroot Utilization   Network Utilization Review Department  Phone: [phone]  Fax: [phone]  Email: [email or web address]  Contact for approvals/pending authorizations, clinical reviews, and discharge.     PHYSICIAN ADVISORY SERVICES:  Medical Necessity Denial & Pvwv-hn-Bhnn Review  Phone: [phone]  Fax: [phone]  Email: [email or web address]     DISCHARGE SUPPORT TEAM:  For Patients Discharge Needs & Updates  Phone: [phone] opt. 2 Fax: [phone]  Email: [email or web address]       normal...

## 2025-02-26 ENCOUNTER — NON-APPOINTMENT (OUTPATIENT)
Age: 45
End: 2025-02-26

## 2025-04-05 NOTE — DIETITIAN INITIAL EVALUATION ADULT. - SIGNS/SYMPTOMS
[FreeTextEntry1] : Mild fatigue [de-identified] : Overall feeling well, no complaints at this time BMI 52.3Kg/m2

## (undated) DEVICE — POSITIONER STRAP ARMBOARD VELCRO TS-30 12

## (undated) DEVICE — GLV 8 ESTEEM BLUE

## (undated) DEVICE — DRSG ACE BANDAGE 2"

## (undated) DEVICE — SUT MONOSOF 5-0 18" P-13

## (undated) DEVICE — GLV 7.5 PROTEXIS

## (undated) DEVICE — CUFF TOURNIQUET 18" DUAL PORT W PLC

## (undated) DEVICE — PREP SCRUB SKIN EXIDINE4% 30OZ

## (undated) DEVICE — PREP KIT CHLOROHEXIDINE 4% 118CC

## (undated) DEVICE — DRSG SLING ARM LG

## (undated) DEVICE — PACK UPPER EXTREMITY

## (undated) DEVICE — POSITIONER FOAM HEAD CRADLE

## (undated) DEVICE — DRSG ESMARK 4"

## (undated) DEVICE — WRAP COMPRESSION CALF MED

## (undated) DEVICE — DRAPE 3/4 SHEET 52X76"

## (undated) DEVICE — DRAPE TOWEL BLUE 17" X 24"

## (undated) DEVICE — BLANKET WARMER LOWER ADULT